# Patient Record
Sex: MALE | Race: WHITE | NOT HISPANIC OR LATINO | Employment: OTHER | URBAN - METROPOLITAN AREA
[De-identification: names, ages, dates, MRNs, and addresses within clinical notes are randomized per-mention and may not be internally consistent; named-entity substitution may affect disease eponyms.]

---

## 2017-04-07 ENCOUNTER — OFFICE VISIT (OUTPATIENT)
Dept: FAMILY MEDICINE | Facility: CLINIC | Age: 57
End: 2017-04-07

## 2017-04-07 VITALS
DIASTOLIC BLOOD PRESSURE: 77 MMHG | WEIGHT: 176 LBS | SYSTOLIC BLOOD PRESSURE: 113 MMHG | TEMPERATURE: 98.8 F | HEART RATE: 88 BPM | OXYGEN SATURATION: 95 % | BODY MASS INDEX: 24.72 KG/M2

## 2017-04-07 DIAGNOSIS — R05.9 COUGH: Primary | ICD-10-CM

## 2017-04-07 LAB
FLUAV AG UPPER RESP QL IA.RAPID: NEGATIVE
FLUBV AG UPPER RESP QL IA.RAPID: NEGATIVE

## 2017-04-07 RX ORDER — PREDNISONE 20 MG/1
TABLET ORAL
Qty: 10 TABLET | Refills: 0 | Status: SHIPPED | OUTPATIENT
Start: 2017-04-07 | End: 2017-06-20

## 2017-04-07 RX ORDER — CODEINE PHOSPHATE AND GUAIFENESIN 10; 100 MG/5ML; MG/5ML
1-2 SOLUTION ORAL EVERY 4 HOURS PRN
Qty: 240 ML | Refills: 0 | Status: SHIPPED | OUTPATIENT
Start: 2017-04-07 | End: 2017-06-20

## 2017-04-07 RX ORDER — ALBUTEROL SULFATE 90 UG/1
2 AEROSOL, METERED RESPIRATORY (INHALATION) EVERY 4 HOURS PRN
Qty: 1 INHALER | Refills: 1 | Status: SHIPPED | OUTPATIENT
Start: 2017-04-07 | End: 2017-06-20

## 2017-04-07 ASSESSMENT — PAIN SCALES - GENERAL: PAINLEVEL: NO PAIN (0)

## 2017-04-07 NOTE — PROGRESS NOTES
Tommy Lerner is a 56 year old male here for the following issues:    Yifan Elmorey is a 55 yo male with 6 day history of fever, chills, rattle in his chest, some body aches, headache, mild sore throat. Decreased appetite no vomiting. Has tightness in his chest. His sone, age 16 was ill with similar symptoms last week. Nonsmoker. Denies SOB. Not able to sleep due to cough. He has no hx of asthma.       Patient Active Problem List   Diagnosis     WALDEMAR (obstructive sleep apnea)       Current Outpatient Prescriptions   Medication Sig Dispense Refill     sildenafil (VIAGRA) 100 MG tablet Take 0.5-1 tablets ( mg) by mouth daily as needed for erectile dysfunction Take 30 min to 4 hours before intercourse.  Never use with nitroglycerin, terazosin or doxazosin. 6 tablet 1     order for DME Patient Tommy Lerner was set up at [facility] on April 29, 2016. Patient received a Resmed AirSense 10 Auto. Pressures were set at Auto 5 - 15 cm H2O.   Patient s ramp is r Off and FLEX/EPR is 3.  Patient received a Lebron Respironics Mask name: DREAMWEAR  Nasal mask Size Medium, heated tubing and heated humidifier.  Patient is enrolled in the STM Program and does not need to meet compliance. Patient has a follow up on 06/20/2016 with Bennett Goltz, PA.    Rosanne Franco Equipment being ordered: CPAP         No Known Allergies     EXAM  /77 (BP Location: Right arm, Patient Position: Chair, Cuff Size: Adult Regular)  Pulse 88  Temp 98.8  F (37.1  C) (Oral)  Wt 176 lb (79.8 kg)  SpO2 95%  BMI 24.72 kg/m2  Gen: appears fatigued, coughing  HEENT:  Conjunctiva nl, TM normal bilaterally, OP clear, no posterior erythema  COR: S1,S2, no murmur  Lungs: CTA bilaterally, no rhonchi, wheezes or rales  Ext: no peripheral edema, pulses full    Results for orders placed or performed in visit on 04/07/17   Influenza A/B Antigen ([place])   Result Value Ref Range    Influenza A NEGATIVE Negative    Influenza B NEGATIVE Negative            Assessment:  (R05) Cough  (primary encounter diagnosis)  Comment: influenza like illness, testing is negative. Bothersome cough, interrupting sleep  Plan: Influenza A/B Antigen ([place]), albuterol         (VENTOLIN HFA) 108 (90 BASE) MCG/ACT Inhaler,         guaiFENesin-codeine (ROBITUSSIN AC) 100-10         MG/5ML SOLN solution, predniSONE (DELTASONE) 20        MG tablet        Recommend burst of steroids, albuterol inhaler with spacer, cough syrup. rest    Shae Brumfield MD  Internal Medicine/Pediatrics

## 2017-04-07 NOTE — MR AVS SNAPSHOT
After Visit Summary   4/7/2017    Tommy Lerner    MRN: [id number]           Patient Information     Date Of Birth          1960        Visit Information        Provider Department      4/7/2017 3:20 PM Shae Brumfield MD [facility]        Today's Diagnoses     Cough    -  1       Follow-ups after your visit        Your next 10 appointments already scheduled     Jun 20, 2017  1:00 PM CDT   Return Sleep Patient with Bennett Ezra Goltz, PA-C   [facility] ([facility])    [address] [place] [address] [id number] [phone]              Who to contact     Please call your clinic at [phone] to:    Ask questions about your health    Make or cancel appointments    Discuss your medicines    Learn about your test results    Speak to your doctor   If you have compliments or concerns about an experience at your clinic, or if you wish to file a complaint, please contact [facility] Patient Relations at [phone] or email us at [email or web address]         Additional Information About Your Visit        MyChart Information     [employer] gives you secure access to your electronic health record. If you see a primary care provider, you can also send messages to your care team and make appointments. If you have questions, please call your primary care clinic.  If you do not have a primary care provider, please call [phone] and they will assist you.      [employer] is an electronic gateway that provides easy, online access to your medical records. With [employer], you can request a clinic appointment, read your test results, renew a prescription or communicate with your care team.     To access your existing account, please contact your [facility] Clinic or call [phone] for assistance.        Care EveryWhere ID     This is your Care EveryWhere ID. This could be used by  other organizations to access your [place] medical records  [id number]        Your Vitals Were     Pulse Temperature Pulse Oximetry BMI (Body Mass Index)          88 98.8  F (37.1  C) (Oral) 95% 24.72 kg/m2         Blood Pressure from Last 3 Encounters:   04/07/17 113/77   06/20/16 129/73   02/12/16 115/79    Weight from Last 3 Encounters:   04/07/17 176 lb (79.8 kg)   06/20/16 173 lb (78.5 kg)   02/12/16 169 lb (76.7 kg)              We Performed the Following     Influenza A/B Antigen ([place])          Today's Medication Changes          These changes are accurate as of: 4/7/17  3:56 PM.  If you have any questions, ask your nurse or doctor.               Start taking these medicines.        Dose/Directions    albuterol 108 (90 BASE) MCG/ACT Inhaler   Commonly known as:  VENTOLIN HFA   Used for:  Cough   Started by:  Shae Brumfield MD        Dose:  2 puff   Inhale 2 puffs into the lungs every 4 hours as needed for shortness of breath / dyspnea or wheezing   Quantity:  1 Inhaler   Refills:  1       guaiFENesin-codeine 100-10 MG/5ML Soln solution   Commonly known as:  ROBITUSSIN AC   Used for:  Cough   Started by:  Shae Brumfield MD        Dose:  1-2 tsp.   Take 5-10 mLs by mouth every 4 hours as needed for cough   Quantity:  240 mL   Refills:  0       predniSONE 20 MG tablet   Commonly known as:  DELTASONE   Used for:  Cough   Started by:  Shae Brumfield MD        Take 2 tablets daily x 5 days   Quantity:  10 tablet   Refills:  0            Where to get your medicines      These medications were sent to [facility] [address] [place] [id number]     Phone:  [phone]     albuterol 108 (90 BASE) MCG/ACT Inhaler         Some of these will need a paper prescription and others can be bought over the counter.  Ask your nurse if you have questions.     Bring a paper prescription for each of these medications     guaiFENesin-codeine  100-10 MG/5ML Soln solution    predniSONE 20 MG tablet                Primary Care Provider Office Phone # Fax #    Shae Zoë Brumfield [phone]       [address] [address]        Thank you!     Thank you for choosing [facility]  for your care. Our goal is always to provide you with excellent care. Hearing back from our patients is one way we can continue to improve our services. Please take a few minutes to complete the written survey that you may receive in the mail after your visit with us. Thank you!             Your Updated Medication List - Protect others around you: Learn how to safely use, store and throw away your medicines at www.disposemymeds.org.          This list is accurate as of: 4/7/17  3:56 PM.  Always use your most recent med list.                   Brand Name Dispense Instructions for use    albuterol 108 (90 BASE) MCG/ACT Inhaler    VENTOLIN HFA    1 Inhaler    Inhale 2 puffs into the lungs every 4 hours as needed for shortness of breath / dyspnea or wheezing       guaiFENesin-codeine 100-10 MG/5ML Soln solution    ROBITUSSIN AC    240 mL    Take 5-10 mLs by mouth every 4 hours as needed for cough       order for DME      Patient Tommy Lerner was set up at [facility] on April 29, 2016. Patient received a Resmed AirSense 10 Auto. Pressures were set at Auto 5 - 15 cm H2O.   Patient?s ramp is r Off and FLEX/EPR is 3.  Patient received a Lebron Respironics Mask name: DREAMWEAR  Nasal mask Size Medium, heated tubing and heated humidifier.  Patient is enrolled in the STM Program and does not need to meet compliance. Patient has a follow up on 06/20/2016 with Bennett Goltz, PA.   Rosanne Franco Equipment being ordered: CPAP       predniSONE 20 MG tablet    DELTASONE    10 tablet    Take 2 tablets daily x 5 days       sildenafil 100 MG cap/tab    VIAGRA    6 tablet    Take 0.5-1 tablets ( mg) by mouth daily as needed for erectile  dysfunction Take 30 min to 4 hours before intercourse.  Never use with nitroglycerin, terazosin or doxazosin.

## 2017-04-07 NOTE — NURSING NOTE
56 year old  Chief Complaint   Patient presents with     Cough     fever, fatigue, rattle in chest, pressure on chest, chills, sweats, and runny nose        Blood pressure 113/77, pulse 88, temperature 98.8  F (37.1  C), temperature source Oral, weight 176 lb (79.8 kg), SpO2 95 %. Body mass index is 24.72 kg/(m^2).  Patient Active Problem List   Diagnosis     WALDEMAR (obstructive sleep apnea)       Wt Readings from Last 2 Encounters:   04/07/17 176 lb (79.8 kg)   06/20/16 173 lb (78.5 kg)     BP Readings from Last 3 Encounters:   04/07/17 113/77   06/20/16 129/73   02/12/16 115/79         Current Outpatient Prescriptions   Medication     sildenafil (VIAGRA) 100 MG tablet     order for DME     No current facility-administered medications for this visit.        Social History   Substance Use Topics     Smoking status: Never Smoker     Smokeless tobacco: Former User     Types: Chew     Quit date: 6/24/1994     Alcohol use No      Comment: mostly on weekends, 1-2 drinks       Health Maintenance Due   Topic Date Due     TETANUS IMMUNIZATION (SYSTEM ASSIGNED)  08/07/1977     ADVANCE DIRECTIVE PLANNING Q5 YRS (NO INBASKET)  08/07/1977     HEPATITIS C SCREENING  08/07/1977     COLON CANCER SCREEN (SYSTEM ASSIGNED)  08/07/2009       No results found for: PAP      April 7, 2017 3:29 PM

## 2017-06-20 ENCOUNTER — OFFICE VISIT (OUTPATIENT)
Dept: SLEEP MEDICINE | Facility: CLINIC | Age: 57
End: 2017-06-20
Payer: COMMERCIAL

## 2017-06-20 VITALS
HEIGHT: 71 IN | HEART RATE: 66 BPM | DIASTOLIC BLOOD PRESSURE: 83 MMHG | SYSTOLIC BLOOD PRESSURE: 130 MMHG | OXYGEN SATURATION: 95 % | WEIGHT: 171 LBS | BODY MASS INDEX: 23.94 KG/M2

## 2017-06-20 DIAGNOSIS — G47.33 OSA (OBSTRUCTIVE SLEEP APNEA): Primary | ICD-10-CM

## 2017-06-20 PROCEDURE — 99213 OFFICE O/P EST LOW 20 MIN: CPT | Performed by: PHYSICIAN ASSISTANT

## 2017-06-20 NOTE — NURSING NOTE
"Chief Complaint   Patient presents with     CPAP Follow Up     follow up nadine       Initial /83  Pulse 66  Ht 1.803 m (5' 11\")  Wt 77.6 kg (171 lb)  SpO2 95%  BMI 23.85 kg/m2 Estimated body mass index is 23.85 kg/(m^2) as calculated from the following:    Height as of this encounter: 1.803 m (5' 11\").    Weight as of this encounter: 77.6 kg (171 lb).  Medication Reconciliation: complete   Ess 5/24  Irma Hernandez MA      "

## 2017-06-20 NOTE — PATIENT INSTRUCTIONS

## 2017-06-20 NOTE — MR AVS SNAPSHOT
After Visit Summary   6/20/2017    Tommy Lerner    MRN: [id number]           Patient Information     Date Of Birth          1960        Visit Information        Provider Department      6/20/2017 1:00 PM Goltz, Bennett Ezra, PA-C [facility]        Today's Diagnoses     WALDEMAR (obstructive sleep apnea)    -  1      Care Instructions      Your BMI is Body mass index is 23.85 kg/(m^2).  Weight management is a personal decision.  If you are interested in exploring weight loss strategies, the following discussion covers the approaches that may be successful. Body mass index (BMI) is one way to tell whether you are at a healthy weight, overweight, or obese. It measures your weight in relation to your height.  A BMI of 18.5 to 24.9 is in the healthy range. A person with a BMI of 25 to 29.9 is considered overweight, and someone with a BMI of 30 or greater is considered obese. More than two-thirds of American adults are considered overweight or obese.  Being overweight or obese increases the risk for further weight gain. Excess weight may lead to heart disease and diabetes.  Creating and following plans for healthy eating and physical activity may help you improve your health.  Weight control is part of healthy lifestyle and includes exercise, emotional health, and healthy eating habits. Careful eating habits lifelong are the mainstay of weight control. Though there are significant health benefits from weight loss, long-term weight loss with diet alone may be very difficult to achieve- studies show long-term success with dietary management in less than 10% of people. Attaining a healthy weight may be especially difficult to achieve in those with severe obesity. In some cases, medications, devices and surgical management might be considered.  What can you do?  If you are overweight or obese and are interested in methods for weight loss, you should discuss this with your provider.      Consider reducing daily calorie intake by 500 calories.     Keep a food journal.     Avoiding skipping meals, consider cutting portions instead.    Diet combined with exercise helps maintain muscle while optimizing fat loss. Strength training is particularly important for building and maintaining muscle mass. Exercise helps reduce stress, increase energy, and improves fitness. Increasing exercise without diet control, however, may not burn enough calories to loose weight.       Start walking three days a week 10-20 minutes at a time    Work towards walking thirty minutes five days a week     Eventually, increase the speed of your walking for 1-2 minutes at time    In addition, we recommend that you review healthy lifestyles and methods for weight loss available through the National Institutes of Health patient information sites:  http://win.niddk.nih.gov/publications/index.htm    And look into health and wellness programs that may be available through your health insurance provider, employer, local community center, or david club.                    Follow-ups after your visit        Follow-up notes from your care team     Return in about 2 years (around 6/20/2019), or if symptoms worsen or fail to improve, for CPAP compliance recheck.      Who to contact     If you have questions or need follow up information about today's clinic visit or your schedule please contact [place] [facility] directly at [phone].  Normal or non-critical lab and imaging results will be communicated to you by MyChart, letter or phone within 4 business days after the clinic has received the results. If you do not hear from us within 7 days, please contact the clinic through MyChart or phone. If you have a critical or abnormal lab result, we will notify you by phone as soon as possible.  Submit refill requests through [employer] or call your pharmacy and they will forward the refill request to us. Please allow 3 business days  "for your refill to be completed.          Additional Information About Your Visit        MyChart Information     [employer] gives you secure access to your electronic health record. If you see a primary care provider, you can also send messages to your care team and make appointments. If you have questions, please call your primary care clinic.  If you do not have a primary care provider, please call [phone] and they will assist you.        Care EveryWhere ID     This is your Care EveryWhere ID. This could be used by other organizations to access your [place] medical records  [id number]        Your Vitals Were     Pulse Height Pulse Oximetry BMI (Body Mass Index)          66 1.803 m (5' 11\") 95% 23.85 kg/m2         Blood Pressure from Last 3 Encounters:   06/20/17 130/83   04/07/17 113/77   06/20/16 129/73    Weight from Last 3 Encounters:   06/20/17 77.6 kg (171 lb)   04/07/17 79.8 kg (176 lb)   06/20/16 78.5 kg (173 lb)              We Performed the Following     Comprehensive DME          Today's Medication Changes          These changes are accurate as of: 6/20/17  1:37 PM.  If you have any questions, ask your nurse or doctor.               Stop taking these medicines if you haven't already. Please contact your care team if you have questions.     albuterol 108 (90 BASE) MCG/ACT Inhaler   Commonly known as:  VENTOLIN HFA           guaiFENesin-codeine 100-10 MG/5ML Soln solution   Commonly known as:  ROBITUSSIN AC           order for DME           predniSONE 20 MG tablet   Commonly known as:  DELTASONE           sildenafil 100 MG cap/tab   Commonly known as:  VIAGRA                    Primary Care Provider Office Phone # Fax #    Shae Brumfield [phone]       [place] [id number] [facility] [address] [facility] [address]        Thank you!     Thank you for choosing [place] [facility]  for your care. Our goal is always to provide you with excellent care. Hearing back from our " patients is one way we can continue to improve our services. Please take a few minutes to complete the written survey that you may receive in the mail after your visit with us. Thank you!             Your Updated Medication List - Protect others around you: Learn how to safely use, store and throw away your medicines at www.disposemymeds.org.      Notice  As of 6/20/2017  1:37 PM    You have not been prescribed any medications.

## 2017-06-20 NOTE — PROGRESS NOTES
Sleep Study Follow-Up Visit:    Date on this visit: 6/20/2017    Tommy Lerner comes in today for follow-up of his CPAP use for mild to moderate WALDEMAR. He was initially seen at the [facility] because his wife tells him he stops breathing in his sleep for 10 years. His medical history is non-contributory. His mother has WALDEMAR.     AHI: 14.8/hr    VIRIDIANA: 14.1/hr   Supine AHI: 39.4/hr  Lateral AHI: 0.5/hr on left and 2.6/hr on right              Average SpO2: 92%  Lowest Desaturation: 82%.  Time Spent Below 89%: 15.6 minutes     He is on auto CPAP 6-12 cm. He is doing well with CPAP. It makes a big difference. He is not aware of snoring with CPAP. No dry nose or mouth. No mask leak issues. He uses a DreamWear Mask. He is comfortable with the temperature and humidity of the CPAP. He has not adjusted the humidity.    The compliance data shows that he has used the CPAP for 30/30 nights, 90% of nights for >4 hours.  The 95th% pressure is 9.5 cm.  The 95th% leak is 12.4 lpm.  The average nightly usage is 6:31, median 6:50.  The average AHI is 0.6/hr.      Past medical/surgical history, family history, social history, medications and allergies were reviewed.      Problem List:  Patient Active Problem List    Diagnosis Date Noted     WALDEMAR (obstructive sleep apnea) 04/25/2016     Priority: Medium        Impression/Plan:    (G47.33) WALDEMAR (obstructive sleep apnea)  (primary encounter diagnosis)  Comment: he is doing very well with CPAP, but not cleaning or changing supplies very often  Plan: Comprehensive DME        Continue auto CPAP 6-12 cm. We looked online at websites to buy supplies. He has a very high deductible. We reviewed recommendations for cleaning and replacing supplies.      He will follow up with me in about 2 year(s), sooner if needed.     Fifteen minutes spent with patient, all of which were spent face-to-face counseling, consulting, coordinating plan of care.      Bennett Goltz, PA-C    CC: No ref.  provider found

## 2017-11-12 ENCOUNTER — HOSPITAL ENCOUNTER (EMERGENCY)
Facility: CLINIC | Age: 57
Discharge: HOME | End: 2017-11-12
Attending: FAMILY MEDICINE | Admitting: FAMILY MEDICINE
Payer: COMMERCIAL

## 2017-11-12 VITALS
OXYGEN SATURATION: 97 % | HEIGHT: 71 IN | SYSTOLIC BLOOD PRESSURE: 115 MMHG | WEIGHT: 166 LBS | TEMPERATURE: 97.7 F | RESPIRATION RATE: 16 BRPM | BODY MASS INDEX: 23.24 KG/M2 | DIASTOLIC BLOOD PRESSURE: 79 MMHG

## 2017-11-12 DIAGNOSIS — S61.213A LACERATION OF LEFT MIDDLE FINGER WITHOUT FOREIGN BODY WITHOUT DAMAGE TO NAIL, INITIAL ENCOUNTER: ICD-10-CM

## 2017-11-12 PROCEDURE — 99283 EMERGENCY DEPT VISIT LOW MDM: CPT | Performed by: EMERGENCY MEDICINE

## 2017-11-12 PROCEDURE — 99283 EMERGENCY DEPT VISIT LOW MDM: CPT | Mod: Z6 | Performed by: EMERGENCY MEDICINE

## 2017-11-12 PROCEDURE — 12001 RPR S/N/AX/GEN/TRNK 2.5CM/<: CPT | Performed by: EMERGENCY MEDICINE

## 2017-11-12 RX ORDER — ASPIRIN 81 MG/1
81 TABLET, CHEWABLE ORAL DAILY
COMMUNITY
End: 2020-01-15

## 2017-11-12 NOTE — ED AVS SNAPSHOT
[facility], Emergency Department    [address]    [facility] [place] [place] [address]    Phone:  [phone]    Fax:  [phone]                                       Tommy Lerner   MRN: [id number]    Department:  [facility], Emergency Department   Date of Visit:  11/12/2017           Patient Information     Date Of Birth          1960        Your diagnoses for this visit were:     Laceration of left middle finger without foreign body without damage to nail, initial encounter        You were seen by Charlie Magdaleno MD.        Discharge Instructions         Extremity Laceration: Stitches, Staples, or Tape  A laceration is a cut through the skin. If it is deep, it may require stitches or staples to close so it can heal. Minor cuts may be treated with surgical tape closures, or skin glue.  X-rays may be done if something may have entered the skin through the cut. You may also need a tetanus shot if you are not up to date on this vaccination.  Home care    Follow the healthcare provider s instructions on how to care for the cut.    Wash your hands with soap and warm water before and after caring for your wound. This is to help prevent infection.    Keep the wound clean and dry. If a bandage was applied and it becomes wet or dirty, replace it. Otherwise, leave it in place for the first 24 hours, then change it once a day or as directed.    If stitches or staples were used, clean the wound daily:    After removing the bandage, wash the area with soap and water. Use a wet cotton swab to loosen and remove any blood or crust that forms.    After cleaning, keep the wound clean and dry. Talk with your healthcare provider before applying any antibiotic ointment to the wound. Reapply the bandage.    You may remove the bandage to shower as usual after the first 24 hours, but don't soak the area in water (no swimming) until the stitches or staples are removed.    If surgical tape closures were used, keep the area clean  and dry. If it becomes wet, blot it dry with a towel. Let the surgical tape fall off on its own.    The healthcare provider may prescribe an antibiotic cream or ointment to prevent infection. He or she may also prescribe an antibiotic pill. Don't stop taking this medicine until you have finished the prescribed course or the provider tells you to stop. The provider may also prescribe medicine for pain. Follow the instructions for taking these medicines.    Avoid activities that may reopen your wound.  Follow-up care  Follow up with your healthcare provider, or as advised. Most skin wounds heal within 10 days. However, an infection may sometimes occur despite proper treatment. Check the wound daily for the signs of infection listed below. Stitches and staples should be removed within 7 to14 days. If surgical tape closures were used, you may remove them after 10 days if they have not fallen off by then.   When to seek medical advice  Call your healthcare provider right away if any of these occur:    Wound bleeding not controlled by direct pressure    Signs of infection, including increasing pain in the wound, increasing wound redness or swelling, or pus or bad odor coming from the wound    Fever of 100.4 F (38 C) or higher or as directed by your healthcare provider    Stitches or staples come apart or fall out or surgical tape falls off before 7 days    Wound edges re-open    Wound changes colors    Numbness occurs around the wound     Decreased movement around the injured area  Date Last Reviewed: 7/1/2017 2000-2017 The [employer]. [address] [place] [address]. All rights reserved. This information is not intended as a substitute for professional medical care. Always follow your healthcare professional's instructions.          24 Hour Appointment Hotline       To make an appointment at any [facility] clinic, call [phone] ([phone]). If you don't have a family doctor or clinic, we  will help you find one. [facility] clinics are conveniently located to serve the needs of you and your family.             Review of your medicines      Our records show that you are taking the medicines listed below. If these are incorrect, please call your family doctor or clinic.        Dose / Directions Last dose taken    aspirin 81 MG chewable tablet   Dose:  81 mg        Take 81 mg by mouth daily   Refills:  0                Orders Needing Specimen Collection     None      Pending Results     No orders found from 11/10/2017 to 11/13/2017.            Pending Culture Results     No orders found from 11/10/2017 to 11/13/2017.            Pending Results Instructions     If you had any lab results that were not finalized at the time of your Discharge, you can call the ED Lab Result RN at [phone]. You will be contacted by this team for any positive Lab results or changes in treatment. The nurses are available 7 days a week from 10A to 6:30P.  You can leave a message 24 hours per day and they will return your call.        Thank you for choosing [facility]       Thank you for choosing [facility] for your care. Our goal is always to provide you with excellent care. Hearing back from our patients is one way we can continue to improve our services. Please take a few minutes to complete the written survey that you may receive in the mail after you visit with us. Thank you!        Healthcare IThart Information     [employer] gives you secure access to your electronic health record. If you see a primary care provider, you can also send messages to your care team and make appointments. If you have questions, please call your primary care clinic.  If you do not have a primary care provider, please call [phone] and they will assist you.        Care EveryWhere ID     This is your Care EveryWhere ID. This could be used by other organizations to access your [facility] medical records  [id number]        Equal Access to Services     MARCELA  WILL : Milenaii mp Potter, wamirnada luqadaha, qaybta kaeric martins, alicia batista. So [place] [phone].    ATENCIÓN: Si habla español, tiene a wilson disposición servicios gratuitos de asistencia lingüística. Llame al [phone].    We comply with applicable federal civil rights laws and [place] laws. We do not discriminate on the basis of race, color, national origin, age, disability, sex, sexual orientation, or gender identity.            After Visit Summary       This is your record. Keep this with you and show to your community pharmacist(s) and doctor(s) at your next visit.

## 2017-11-12 NOTE — ED PROVIDER NOTES
"  History     Chief Complaint   Patient presents with     Laceration     Pt was making chili and cut (L) middle finger with a knife. Tetanus up to date     HPI  Tommy Lerner is a 57 year old male who was making chili in the kitchen using a sharp knife, he was cutting an onion when the knife slipped and cut him on the radial side of the left middle finger distal to the DIP joint.  No other injuries or complaints.  Reports tetanus immunization status up-to-date.  He went to urgent care and was referred to the emergency room.    I have reviewed the Medications, Allergies, Past Medical and Surgical History, and Social History in the Epic system.    Review of Systems  Negative unless mentioned above  Physical Exam   BP: 115/79  Heart Rate: 64  Temp: 97.7  F (36.5  C)  Resp: 16  Height: 180.3 cm (5' 11\")  Weight: 75.3 kg (166 lb)  SpO2: 97 %      Physical Exam   Constitutional: He appears well-developed and well-nourished. No distress.   Eyes: Pupils are equal, round, and reactive to light.   Cardiovascular: Normal rate.    Pulmonary/Chest: Effort normal.   Musculoskeletal: Normal range of motion.        Hands:  No evidence of tendon injury.  Neurovascular status normal.       ED Course     ED Course     Procedures             Critical Care time:  none             Labs Ordered and Resulted from Time of ED Arrival Up to the Time of Departure from the ED - No data to display         Assessments & Plan (with Medical Decision Making)   Superficial cuts to the distal aspect of the left middle finger.  No involvement of the nail, nailbed, tendon, or joint.  Laceration repaired using Steritapes and Dermabond.  The patient was given wound care instructions.    I have reviewed the nursing notes.    I have reviewed the findings, diagnosis, plan and need for follow up with the patient.    New Prescriptions    No medications on file       Final diagnoses:   Laceration of left middle finger without foreign body without damage to " nail, initial encounter       11/12/2017   [facility], [place], EMERGENCY DEPARTMENT     Charlie Magdaleno MD  11/12/17 [phone]

## 2017-11-12 NOTE — ED AVS SNAPSHOT
[facility], [place], Emergency Department    [address]    [facility] [address]    Phone:  [phone]    Fax:  [phone]                                       Tommy Lerner   MRN: [id number]    Department:  [facility], Emergency Department   Date of Visit:  11/12/2017           After Visit Summary Signature Page     I have received my discharge instructions, and my questions have been answered. I have discussed any challenges I see with this plan with the nurse or doctor.    ..........................................................................................................................................  Patient/Patient Representative Signature      ..........................................................................................................................................  Patient Representative Print Name and Relationship to Patient    ..................................................               ................................................  Date                                            Time    ..........................................................................................................................................  Reviewed by Signature/Title    ...................................................              ..............................................  Date                                                            Time

## 2017-11-12 NOTE — DISCHARGE INSTRUCTIONS
Extremity Laceration: Stitches, Staples, or Tape  A laceration is a cut through the skin. If it is deep, it may require stitches or staples to close so it can heal. Minor cuts may be treated with surgical tape closures, or skin glue.  X-rays may be done if something may have entered the skin through the cut. You may also need a tetanus shot if you are not up to date on this vaccination.  Home care    Follow the healthcare provider s instructions on how to care for the cut.    Wash your hands with soap and warm water before and after caring for your wound. This is to help prevent infection.    Keep the wound clean and dry. If a bandage was applied and it becomes wet or dirty, replace it. Otherwise, leave it in place for the first 24 hours, then change it once a day or as directed.    If stitches or staples were used, clean the wound daily:    After removing the bandage, wash the area with soap and water. Use a wet cotton swab to loosen and remove any blood or crust that forms.    After cleaning, keep the wound clean and dry. Talk with your healthcare provider before applying any antibiotic ointment to the wound. Reapply the bandage.    You may remove the bandage to shower as usual after the first 24 hours, but don't soak the area in water (no swimming) until the stitches or staples are removed.    If surgical tape closures were used, keep the area clean and dry. If it becomes wet, blot it dry with a towel. Let the surgical tape fall off on its own.    The healthcare provider may prescribe an antibiotic cream or ointment to prevent infection. He or she may also prescribe an antibiotic pill. Don't stop taking this medicine until you have finished the prescribed course or the provider tells you to stop. The provider may also prescribe medicine for pain. Follow the instructions for taking these medicines.    Avoid activities that may reopen your wound.  Follow-up care  Follow up with your healthcare provider, or as  advised. Most skin wounds heal within 10 days. However, an infection may sometimes occur despite proper treatment. Check the wound daily for the signs of infection listed below. Stitches and staples should be removed within 7 to14 days. If surgical tape closures were used, you may remove them after 10 days if they have not fallen off by then.   When to seek medical advice  Call your healthcare provider right away if any of these occur:    Wound bleeding not controlled by direct pressure    Signs of infection, including increasing pain in the wound, increasing wound redness or swelling, or pus or bad odor coming from the wound    Fever of 100.4 F (38 C) or higher or as directed by your healthcare provider    Stitches or staples come apart or fall out or surgical tape falls off before 7 days    Wound edges re-open    Wound changes colors    Numbness occurs around the wound     Decreased movement around the injured area  Date Last Reviewed: 7/1/2017 2000-2017 The [employer]. [address] [place], [place] [address]. All rights reserved. This information is not intended as a substitute for professional medical care. Always follow your healthcare professional's instructions.

## 2018-01-17 ENCOUNTER — OFFICE VISIT (OUTPATIENT)
Dept: FAMILY MEDICINE | Facility: CLINIC | Age: 58
End: 2018-01-17
Payer: COMMERCIAL

## 2018-01-17 VITALS
SYSTOLIC BLOOD PRESSURE: 127 MMHG | BODY MASS INDEX: 23.24 KG/M2 | OXYGEN SATURATION: 99 % | HEIGHT: 71 IN | TEMPERATURE: 98.1 F | HEART RATE: 65 BPM | DIASTOLIC BLOOD PRESSURE: 83 MMHG | WEIGHT: 166 LBS

## 2018-01-17 DIAGNOSIS — Z23 VIRAL HEPATITIS VACCINATION: ICD-10-CM

## 2018-01-17 DIAGNOSIS — Z23 NEED FOR PROPHYLACTIC VACCINATION AND INOCULATION AGAINST INFLUENZA: ICD-10-CM

## 2018-01-17 DIAGNOSIS — S16.1XXA STRAIN OF NECK MUSCLE, INITIAL ENCOUNTER: ICD-10-CM

## 2018-01-17 DIAGNOSIS — R05.9 COUGH: Primary | ICD-10-CM

## 2018-01-17 RX ORDER — ALBUTEROL SULFATE 90 UG/1
2 AEROSOL, METERED RESPIRATORY (INHALATION) EVERY 4 HOURS PRN
Qty: 1 INHALER | Refills: 1 | Status: SHIPPED | OUTPATIENT
Start: 2018-01-17 | End: 2019-02-08

## 2018-01-17 NOTE — PATIENT INSTRUCTIONS
Sternocleidomastoid muscle stretches    Albuterol refilled    Hepatitis A series is done  Hepatitis B series completed

## 2018-01-17 NOTE — NURSING NOTE
"57 year old  Chief Complaint   Patient presents with     Cough     Flu Shot     Neck Pain     pt has neck pain for about a month      Imm/Inj     pt is wondering about the shingles vaccine also wants to finish the twinrix vaccine       Blood pressure 127/83, pulse 65, temperature 98.1  F (36.7  C), temperature source Oral, height 5' 10.98\" (180.3 cm), weight 166 lb (75.3 kg), SpO2 99 %. Body mass index is 23.16 kg/(m^2).  Patient Active Problem List   Diagnosis     WALDEMAR (obstructive sleep apnea)       Wt Readings from Last 2 Encounters:   01/17/18 166 lb (75.3 kg)   11/12/17 166 lb (75.3 kg)     BP Readings from Last 3 Encounters:   01/17/18 127/83   11/12/17 115/79   06/20/17 130/83         Current Outpatient Prescriptions   Medication     aspirin 81 MG chewable tablet     No current facility-administered medications for this visit.        Social History   Substance Use Topics     Smoking status: Never Smoker     Smokeless tobacco: Former User     Types: Chew     Quit date: 6/24/1994     Alcohol use No      Comment: mostly on weekends, 1-2 drinks       Health Maintenance Due   Topic Date Due     HEPATITIS C SCREENING  08/07/1977     COLON CANCER SCREEN (SYSTEM ASSIGNED)  08/07/2009     ADVANCE DIRECTIVE PLANNING Q5 YRS  08/07/2014     INFLUENZA VACCINE (SYSTEM ASSIGNED)  09/01/2017       No results found for: PAP      January 17, 2018 9:15 AM  "

## 2018-01-17 NOTE — PROGRESS NOTES
"Tommy Lerner is a 57 year old male here for the following issues:       Cough  Jaime presents with complaint of cough. Cough has been ongoing for \"years\". Lots of postnasal drip in the morning. He sometimes associates coughing after eating raising suspicion of GERD.     He is a cross country skier. He describes a dry cough with exercise. He does not currently feel he needs to use an inhaler.  He does not take antihistamine. He has a dog allergy and reports he owned a dog (which  a month ago). He is also allergic to a down comforter, which is on his bed.     Strain of neck muscle, initial encounter  Jaime reports pain with turning his neck to the left. He points to pain over left anterior SCM. Ongoing for about a month. Painful to touch. Denies numbness/ tingling.  Cannot recall an injury. He is not taking anything for pain. No use of ice or heat    Vaccine request  He is requesting flu vaccine today. Also would like to complete Hep B series, he has had 2 previous Twinrix vaccines. He is also asking about Shingrix vaccine, which is not yet available.     Patient Active Problem List   Diagnosis     WALDEMAR (obstructive sleep apnea)       Current Outpatient Prescriptions   Medication Sig Dispense Refill     aspirin 81 MG chewable tablet Take 81 mg by mouth daily         No Known Allergies     EXAM  /83  Pulse 65  Temp 98.1  F (36.7  C) (Oral)  Ht 5' 10.98\" (180.3 cm)  Wt 166 lb (75.3 kg)  SpO2 99%  BMI 23.16 kg/m2  Gen: Alert, pleasant, NAD  HEENT:  Conjunctiva nl, TM normal bilaterally, OP clear, no posterior erythema  Neck: FROM, pain with lateral rotation looking left.  Point tenderness over anterior Left SCM  COR: S1,S2, no murmur  Lungs: CTA bilaterally, no rhonchi, wheezes or rales  Abdomen: Soft, non tender, normal bowel sounds      Assessment:  (R05) Cough  (primary encounter diagnosis)  Comment: suspect need for use of albuterol inhaler before exercise, may have GERD component  Plan: albuterol " (VENTOLIN HFA) 108 (90 BASE) MCG/ACT         Inhaler        Recommend antihistamine as he may have seasonal allergies. Use inhaler before exercise. Treat GERD if he has significant symptoms    (Z23) Need for prophylactic vaccination and inoculation against influenza  Comment: per pt request  Plan: FLU VAC, SPLIT VIRUS IM > 3 YO (QUADRIVALENT)         [24662], Vaccine Administration, Initial         [32720]        Given today    (S16.1XXA) Strain of neck muscle, initial encounter  Comment: left sided muscle strain  Plan: printed handout on home exercises for SCM strain. He may use NSAIDs, heat. If not improving could refer him to PT    (Z23) Viral hepatitis vaccination  Comment: due for final Hepatitis B vaccine, has had 2 twinrix vaccines  Plan: HEPATITIS B VACCINE,ADULT,IM        This vaccine completes the series    Shae Brumfield MD  Internal Medicine/Pediatrics

## 2018-01-17 NOTE — MR AVS SNAPSHOT
After Visit Summary   1/17/2018    Tommy Lerner    MRN: [id number]           Patient Information     Date Of Birth          1960        Visit Information        Provider Department      1/17/2018 9:20 AM Shae Brumfield MD [facility]        Today's Diagnoses     Cough    -  1    Need for prophylactic vaccination and inoculation against influenza        Strain of neck muscle, initial encounter        Viral hepatitis vaccination          Care Instructions    Sternocleidomastoid muscle stretches    Albuterol refilled    Hepatitis A series is done  Hepatitis B series completed           Follow-ups after your visit        Who to contact     Please call your clinic at [phone] to:    Ask questions about your health    Make or cancel appointments    Discuss your medicines    Learn about your test results    Speak to your doctor   If you have compliments or concerns about an experience at your clinic, or if you wish to file a complaint, please contact [facility] Patient Relations at [phone] or email us at [email or web address]         Additional Information About Your Visit        [email or web address] Information     [employer] gives you secure access to your electronic health record. If you see a primary care provider, you can also send messages to your care team and make appointments. If you have questions, please call your primary care clinic.  If you do not have a primary care provider, please call [phone] and they will assist you.      [employer] is an electronic gateway that provides easy, online access to your medical records. With [employer], you can request a clinic appointment, read your test results, renew a prescription or communicate with your care team.     To access your existing account, please contact your [facility] or call [phone] for assistance.        Care EveryWhere ID     This is your Care EveryWhere  "ID. This could be used by other organizations to access your [facility] medical records  [id number]        Your Vitals Were     Pulse Temperature Height Pulse Oximetry BMI (Body Mass Index)       65 98.1  F (36.7  C) (Oral) 5' 10.98\" (180.3 cm) 99% 23.16 kg/m2        Blood Pressure from Last 3 Encounters:   01/17/18 127/83   11/12/17 115/79   06/20/17 130/83    Weight from Last 3 Encounters:   01/17/18 166 lb (75.3 kg)   11/12/17 166 lb (75.3 kg)   06/20/17 171 lb (77.6 kg)              We Performed the Following     FLU VAC, SPLIT VIRUS IM > 3 YO (QUADRIVALENT) [37777]     HEPATITIS B VACCINE,ADULT,IM     Vaccine Administration, Initial [36873]          Today's Medication Changes          These changes are accurate as of: 1/17/18  9:52 AM.  If you have any questions, ask your nurse or doctor.               Start taking these medicines.        Dose/Directions    albuterol 108 (90 BASE) MCG/ACT Inhaler   Commonly known as:  VENTOLIN HFA   Used for:  Cough   Started by:  Shae Brumfield MD        Dose:  2 puff   Inhale 2 puffs into the lungs every 4 hours as needed for shortness of breath / dyspnea or wheezing   Quantity:  1 Inhaler   Refills:  1            Where to get your medicines      These medications were sent to [address] [address]     Phone:  [phone]     albuterol 108 (90 BASE) MCG/ACT Inhaler                Primary Care Provider Office Phone # Fax #    Shae Brumfield [phone] [address] [place] [address]        Equal Access to Services     MARIAN SOLIS AH: Hadsea Potter, waglen ludesireeadaha, papo kaalmada eliezer, alicia batista. So [facility] [phone].    ATENCIÓN: Si habla español, tiene a wilson disposición servicios gratuitos de asistencia lingüística. Llame al [phone].    We comply with applicable federal civil rights laws and " [place] laws. We do not discriminate on the basis of race, color, national origin, age, disability, sex, sexual orientation, or gender identity.            Thank you!     Thank you for choosing [facility]  for your care. Our goal is always to provide you with excellent care. Hearing back from our patients is one way we can continue to improve our services. Please take a few minutes to complete the written survey that you may receive in the mail after your visit with us. Thank you!             Your Updated Medication List - Protect others around you: Learn how to safely use, store and throw away your medicines at www.disposemymeds.org.          This list is accurate as of: 1/17/18  9:52 AM.  Always use your most recent med list.                   Brand Name Dispense Instructions for use Diagnosis    albuterol 108 (90 BASE) MCG/ACT Inhaler    VENTOLIN HFA    1 Inhaler    Inhale 2 puffs into the lungs every 4 hours as needed for shortness of breath / dyspnea or wheezing    Cough       aspirin 81 MG chewable tablet      Take 81 mg by mouth daily

## 2019-02-08 ENCOUNTER — OFFICE VISIT (OUTPATIENT)
Dept: FAMILY MEDICINE | Facility: CLINIC | Age: 59
End: 2019-02-08
Payer: COMMERCIAL

## 2019-02-08 VITALS
TEMPERATURE: 97.7 F | DIASTOLIC BLOOD PRESSURE: 83 MMHG | HEART RATE: 63 BPM | SYSTOLIC BLOOD PRESSURE: 125 MMHG | OXYGEN SATURATION: 98 %

## 2019-02-08 DIAGNOSIS — Z13.9 SCREENING FOR CONDITION: ICD-10-CM

## 2019-02-08 DIAGNOSIS — Z12.11 SCREEN FOR COLON CANCER: ICD-10-CM

## 2019-02-08 DIAGNOSIS — Z12.5 SCREENING FOR PROSTATE CANCER: ICD-10-CM

## 2019-02-08 DIAGNOSIS — Z11.59 NEED FOR HEPATITIS C SCREENING TEST: ICD-10-CM

## 2019-02-08 DIAGNOSIS — R05.9 COUGH: ICD-10-CM

## 2019-02-08 DIAGNOSIS — Z13.220 LIPID SCREENING: ICD-10-CM

## 2019-02-08 DIAGNOSIS — Z13.0 SCREENING FOR DEFICIENCY ANEMIA: ICD-10-CM

## 2019-02-08 DIAGNOSIS — R68.89 EXERCISE INTOLERANCE: ICD-10-CM

## 2019-02-08 DIAGNOSIS — N52.9 ERECTILE DYSFUNCTION, UNSPECIFIED ERECTILE DYSFUNCTION TYPE: ICD-10-CM

## 2019-02-08 DIAGNOSIS — F43.22 ADJUSTMENT DISORDER WITH ANXIOUS MOOD: Primary | ICD-10-CM

## 2019-02-08 LAB
ALBUMIN SERPL-MCNC: 3.6 G/DL (ref 3.2–4.5)
ALP SERPL-CCNC: 70 U/L (ref 40–150)
ALT SERPL-CCNC: 28 U/L (ref 0–70)
AST SERPL-CCNC: 25 U/L (ref 0–55)
BILIRUB SERPL-MCNC: 1.2 MG/DL (ref 0.2–1.3)
BUN SERPL-MCNC: 17 MG/DL (ref 7–30)
CALCIUM SERPL-MCNC: 9.2 MG/DL (ref 8.5–10.4)
CHLORIDE SERPLBLD-SCNC: 105 MMOL/L (ref 94–109)
CHOLEST SERPL-MCNC: 201 MG/DL
CO2 SERPL-SCNC: 30 MMOL/L (ref 20–32)
CREAT SERPL-MCNC: 1.5 MG/DL (ref 0.8–1.5)
EGFR CALCULATED (BLACK REFERENCE): 61.6
EGFR CALCULATED (NON BLACK REFERENCE): 50.9
ERYTHROCYTE [DISTWIDTH] IN BLOOD BY AUTOMATED COUNT: 12.8 % (ref 10–15)
GLUCOSE SERPL-MCNC: 92 MG/DL (ref 60–109)
HCT VFR BLD AUTO: 45.8 % (ref 40–53)
HDLC SERPL-MCNC: 75 MG/DL
HGB BLD-MCNC: 15.5 G/DL (ref 13.3–17.7)
LDLC SERPL CALC-MCNC: 103 MG/DL
MCH RBC QN AUTO: 31.8 PG (ref 26.5–33)
MCHC RBC AUTO-ENTMCNC: 33.8 G/DL (ref 31.5–36.5)
MCV RBC AUTO: 94 FL (ref 78–100)
NONHDLC SERPL-MCNC: 126 MG/DL
PLATELET # BLD AUTO: 245 10E9/L (ref 150–450)
POTASSIUM SERPL-SCNC: 4.8 MMOL/L (ref 3.4–5.3)
PROT SERPL-MCNC: 6.5 G/DL (ref 6.8–8.8)
PSA SERPL-ACNC: 1.02 UG/L (ref 0–4)
RBC # BLD AUTO: 4.87 10E12/L (ref 4.4–5.9)
SODIUM SERPL-SCNC: 144 MMOL/L (ref 136–145)
TRIGL SERPL-MCNC: 114 MG/DL
WBC # BLD AUTO: 6.7 10E9/L (ref 4–11)

## 2019-02-08 RX ORDER — SILDENAFIL 100 MG/1
100 TABLET, FILM COATED ORAL DAILY PRN
Qty: 30 TABLET | Refills: 11 | Status: SHIPPED | OUTPATIENT
Start: 2019-02-08 | End: 2019-07-11

## 2019-02-08 RX ORDER — ALBUTEROL SULFATE 90 UG/1
2 AEROSOL, METERED RESPIRATORY (INHALATION) EVERY 4 HOURS PRN
Qty: 1 INHALER | Refills: 11 | Status: SHIPPED | OUTPATIENT
Start: 2019-02-08 | End: 2020-10-13

## 2019-02-08 NOTE — NURSING NOTE
58 year old  Chief Complaint   Patient presents with     Blood Draw     PSA testing      Refill Request     inhaler and also blood pressure medication       Blood pressure 125/83, pulse 63, temperature 97.7  F (36.5  C), temperature source Oral, SpO2 98 %. There is no height or weight on file to calculate BMI.  Patient Active Problem List   Diagnosis     WALDEMAR (obstructive sleep apnea)       Wt Readings from Last 2 Encounters:   01/17/18 75.3 kg (166 lb)   11/12/17 75.3 kg (166 lb)     BP Readings from Last 3 Encounters:   02/08/19 125/83   01/17/18 127/83   11/12/17 115/79         Current Outpatient Medications   Medication     albuterol (VENTOLIN HFA) 108 (90 BASE) MCG/ACT Inhaler     aspirin 81 MG chewable tablet     No current facility-administered medications for this visit.        Social History     Tobacco Use     Smoking status: Never Smoker     Smokeless tobacco: Former User     Types: Chew   Substance Use Topics     Alcohol use: No     Alcohol/week: 0.0 oz     Comment: mostly on weekends, 1-2 drinks     Drug use: No       Health Maintenance Due   Topic Date Due     HIV SCREEN (SYSTEM ASSIGNED)  08/07/1977     HEPATITIS C SCREENING  08/07/1977     COLON CANCER SCREEN (SYSTEM ASSIGNED)  08/07/2009     ZOSTER IMMUNIZATION (1 of 2) 08/07/2009     ADVANCE DIRECTIVE PLANNING Q5 YRS  08/07/2014     PHQ-2 Q1 YR  01/17/2019       No results found for: PAP      February 8, 2019 1:53 PM

## 2019-02-08 NOTE — PROGRESS NOTES
"Tommy Lerner is a 58 year old male here for the following issues:    Insomnia  Jaime is a 59 yo male in good health, he reports a 1 year history of waking up at 3 am every day. He is not able to fall back asleep after waking up.  He has a hx of sleep apnea, mask fits well. He is having 4-5 hours of sleep at night. He goes to bed at the same time at night, 10:30. He is going through a divorce and thinks anxiety is contributing to his insomnia. He exercises regularly, skiing.  Limiting his screen time before bed. He has 1-2 cups of coffee in the morning, 1-2 beers at night. No restless leg syndrome. He has done counseling in the past and will consider this again.     Shingles vaccine  Jaime is asking for the Shingrix vaccine today. The vaccine is not available in clinic at this time, he will contact his pharmacy.     Erectile Dysfunction  He is asking for a Viagra refill today.     Cough  Jaime reports a chronic post nasal drip and nasal drainage. He has a dry cough because of the post nasal drip. He is asking for an albuterol refill today. Cold is not triggered by cold air. Nonsmoker. He does not take antihistamines.     Exercise intolerance  Jaime is a cross country skier and uses roller-skis in the summer. He has noticed that lately \"doesn't have the extra push\" when exercising. He had to cut his exercise short because he was coughing 2 days ago. He knows he isn't sleeping well and it is contributing to this change in exercise.    GERD  He reports a history of GERD. He does not take medications to treat symptoms. He avoids eating late at night.  No dysphagia or dark stools.     HCM  Jaime is due for colon cancer screening, no family history of colon cancer. He reports constipation, which is unusual for him. HIV screening completed in 1995.     Patient Active Problem List   Diagnosis     WALDEMAR (obstructive sleep apnea)       Current Outpatient Medications   Medication Sig Dispense Refill     albuterol (VENTOLIN HFA) " 108 (90 Base) MCG/ACT inhaler Inhale 2 puffs into the lungs every 4 hours as needed for shortness of breath / dyspnea or wheezing 1 Inhaler 11     sildenafil (VIAGRA) 100 MG tablet Take 1 tablet (100 mg) by mouth daily as needed (ED) Take 30 min to 4 hours before intercourse.  Never use with nitroglycerin, terazosin or doxazosin. 30 tablet 11     aspirin 81 MG chewable tablet Take 81 mg by mouth daily         No Known Allergies     EXAM  /83   Pulse 63   Temp 97.7  F (36.5  C) (Oral)   SpO2 98%   Gen: Alert, pleasant, NAD  HEENT:  Conjunctiva nl, TM normal bilaterally, OP clear, no posterior erythema  Neck: No lymphadenopathy   COR: S1,S2, no murmur  Lungs: CTA bilaterally, no rhonchi, wheezes or rales  Abdomen: Soft, non tender, normal bowel sounds, no HSM or mass  Ext: no peripheral edema, pulses full  Neuro: DTR +2/4 in all extremities      Assessment:  (F43.22) Adjustment disorder with anxious mood  (primary encounter diagnosis)  Comment: currently going through divorce, some sleep disturbance  Plan: recommend formal counseling. Consider medication if symptoms not clearing.    (N52.9) Erectile dysfunction, unspecified erectile dysfunction type  Comment: per pt request  Plan: sildenafil (VIAGRA) 100 MG tablet        Refilled medication    (R05) Cough  Comment: reactive cough  Plan: albuterol (VENTOLIN HFA) 108 (90 Base) MCG/ACT         inhaler        Refilled albuterol inhaler, gave instructions on use    (Z12.11) Screen for colon cancer  Comment: needs baseline study  Plan: GASTROENTEROLOGY ADULT REF PROCEDURE ONLY         [facility] [phone]        Referral is given    (Z12.5) Screening for prostate cancer  Comment: routine screening  Plan: Prostate spec antigen screen            (Z11.59) Need for hepatitis C screening test  Comment: routine screening  Plan: Hepatitis C Screen Reflex to HCV RNA Quant and         Genotype            (Z13.0) Screening for deficiency anemia  Comment: routine  screening  Plan: CBC with platelets        If low, the push for colonoscopy    (R68.89) Exercise intolerance  Comment: he reports change in exercise tolerance  Plan: SPORTS MEDICINE REFERRAL        Refer to sports medicine clinic for evaluation    (Z13.9) Screening for condition  Comment: not fasting  Plan: Comprehensive Metabolic Panel ([place])       Check baseline kidney and liver tests    (Z13.220) Lipid screening  Comment: screening, nonfasting  Plan: Lipid Profile            Shae Brumfield MD  Internal Medicine/Pediatrics      I, Flora Reddy, am serving as a scribe to document services personally performed by Dr. Shae Brumfield, based on data collection and the provider's statements to me. Dr. Brumfield has reviewed, edited, and approved the above note.

## 2019-02-11 LAB — HCV AB SERPL QL IA: NONREACTIVE

## 2019-07-11 DIAGNOSIS — N52.9 ERECTILE DYSFUNCTION, UNSPECIFIED ERECTILE DYSFUNCTION TYPE: ICD-10-CM

## 2019-07-11 RX ORDER — SILDENAFIL 100 MG/1
100 TABLET, FILM COATED ORAL DAILY PRN
Qty: 30 TABLET | Refills: 5 | Status: SHIPPED | OUTPATIENT
Start: 2019-07-11 | End: 2020-07-23

## 2019-07-11 NOTE — TELEPHONE ENCOUNTER
Transfer [facility] script to [facility] on [address] in [facility].  Jenna Dwyer RN  [facility]

## 2019-07-15 ENCOUNTER — TRANSFERRED RECORDS (OUTPATIENT)
Dept: HEALTH INFORMATION MANAGEMENT | Facility: CLINIC | Age: 59
End: 2019-07-15

## 2019-11-08 ENCOUNTER — OFFICE VISIT (OUTPATIENT)
Dept: FAMILY MEDICINE | Facility: CLINIC | Age: 59
End: 2019-11-08
Payer: COMMERCIAL

## 2019-11-08 VITALS
DIASTOLIC BLOOD PRESSURE: 81 MMHG | HEIGHT: 71 IN | BODY MASS INDEX: 23.66 KG/M2 | SYSTOLIC BLOOD PRESSURE: 123 MMHG | OXYGEN SATURATION: 97 % | HEART RATE: 65 BPM | WEIGHT: 169 LBS | RESPIRATION RATE: 16 BRPM | TEMPERATURE: 97.4 F

## 2019-11-08 DIAGNOSIS — R20.0 NUMBNESS OF TONGUE: ICD-10-CM

## 2019-11-08 DIAGNOSIS — R26.89 BALANCE PROBLEMS: Primary | ICD-10-CM

## 2019-11-08 DIAGNOSIS — R43.0 ANOSMIA: ICD-10-CM

## 2019-11-08 LAB
BUN SERPL-MCNC: 18 MG/DL (ref 7–30)
CALCIUM SERPL-MCNC: 9.2 MG/DL (ref 8.5–10.4)
CHLORIDE SERPLBLD-SCNC: 106 MMOL/L (ref 94–109)
CO2 SERPL-SCNC: 30 MMOL/L (ref 20–32)
CREAT SERPL-MCNC: 0.8 MG/DL (ref 0.8–1.5)
EGFR CALCULATED (BLACK REFERENCE): 126.8
EGFR CALCULATED (NON BLACK REFERENCE): 104.8
GLUCOSE SERPL-MCNC: 95 MG/DL (ref 60–99)
POTASSIUM SERPL-SCNC: 3.9 MMOL/L (ref 3.4–5.3)
SODIUM SERPL-SCNC: 146 MMOL/L (ref 137.3–146.3)

## 2019-11-08 ASSESSMENT — MIFFLIN-ST. JEOR: SCORE: 1598.45

## 2019-11-08 NOTE — PROGRESS NOTES
"Tommy Lerner is a 59 year old male here for the following issues:       Problems with balance  Jaime is a 60 yo male who is typically in excellent health. He is an avid marathon skiier and roller skis in warmer weather. Over the past 2 wks he developed onset of difficulty with his balance. This is not usually a problem for him but he found himself \"holding on to things\" to steady himself. No near syncope or palpitations. Other athletes noted his loss of balance and were surprised. No hx of vertigo. No sinus congestion or ear fullness. No tinnitus. No nausea. No headaches. He denies leola weakness of his extremities but report the issues with balance have made him \"concentrate\" on his walking. He denies paresthesias. No low back pain. Symptoms have been unchanged in severity since they began 2 wks ago, static.    Numbness of tongue  During the past 2 wks he has also noted numbness at the lateral aspect of his tongue, Left side >right. No difficulty speaking or swallowing.     Anosmia  He reports several year history of dampened sense of smell. Does not believe it is associated with loss of taste. No hx of head trauma. He reports he thought this was \"a normal part of aging\". No hx of nasal polyps or sinus surgery.     Patient Active Problem List   Diagnosis     WALDEMAR (obstructive sleep apnea)       Current Outpatient Medications   Medication Sig Dispense Refill     albuterol (VENTOLIN HFA) 108 (90 Base) MCG/ACT inhaler Inhale 2 puffs into the lungs every 4 hours as needed for shortness of breath / dyspnea or wheezing 1 Inhaler 11     aspirin 81 MG chewable tablet Take 81 mg by mouth daily       sildenafil (VIAGRA) 100 MG tablet Take 1 tablet (100 mg) by mouth daily as needed (ED) Take 30min- 4 hrs before intercourse.No use with nitroglycerin, terazosin or doxazosin. 30 tablet 5       No Known Allergies     EXAM  BP (!) 144/91   Pulse 65   Temp 97.4  F (36.3  C) (Oral)   Resp 16   Ht 1.803 m (5' 10.98\")   Wt " 76.7 kg (169 lb)   SpO2 97%   BMI 23.58 kg/m    Gen: Alert, pleasant, NAD  HEENT:  Conjunctiva nl, TM normal bilaterally, Nares are clear, no TTP over sinuses. OP clear, no posterior erythema  Eyes: NO nystagmus  COR: S1,S2, no murmur, no ectopy  Lungs: CTA bilaterally, no rhonchi, wheezes or rales  Ext: no peripheral edema, pulses full  Neuro: CN 2-12 grossly intact  CN 2-12 intact except subjective numbness over lateral aspect of tongue, L>R  DTR +2/4 in all extremities  Some difficulty with tandem gait, some difficulty with walking on toes, heel walking is easier  Negative Rhomberg , FNF and DANGELO are normal.     Assessment:  (R26.89) Balance problems  (primary encounter diagnosis)  Comment: new onset, persistent symptoms, does not appear consistent with vertigo.  Plan: MR Brain for Stroke Cmpl w/o & w [place]ras,         NEUROLOGY ADULT REFERRAL, Basic Metabolic Panel        ([place]), CANCELED: Basic metabolic panel        Obtain MRI of brain with/ WO contrast, referral to neurology, to ER for any acute escalation of symptoms or new neuro deficits.     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

## 2019-11-08 NOTE — NURSING NOTE
"59 year old  Chief Complaint   Patient presents with     Dizziness     Numbness     on his tongue. Reports he feels weird overall, has been on going for a few weeks now.        Blood pressure (!) 144/91, pulse 65, temperature 97.4  F (36.3  C), temperature source Oral, resp. rate 16, height 1.803 m (5' 10.98\"), weight 76.7 kg (169 lb), SpO2 97 %. Body mass index is 23.58 kg/m .  Patient Active Problem List   Diagnosis     WALDEMAR (obstructive sleep apnea)       Wt Readings from Last 2 Encounters:   11/08/19 76.7 kg (169 lb)   01/17/18 75.3 kg (166 lb)     BP Readings from Last 3 Encounters:   11/08/19 (!) 144/91   02/08/19 125/83   01/17/18 127/83         Current Outpatient Medications   Medication     albuterol (VENTOLIN HFA) 108 (90 Base) MCG/ACT inhaler     aspirin 81 MG chewable tablet     sildenafil (VIAGRA) 100 MG tablet     No current facility-administered medications for this visit.        Social History     Tobacco Use     Smoking status: Never Smoker     Smokeless tobacco: Former User     Types: Chew   Substance Use Topics     Alcohol use: No     Alcohol/week: 0.0 standard drinks     Comment: mostly on weekends, 1-2 drinks     Drug use: No       Health Maintenance Due   Topic Date Due     ADVANCE CARE PLANNING  1960     COLONOSCOPY  08/07/1969     PREVENTIVE CARE VISIT  06/24/2016     PHQ-2  01/01/2019       No results found for: PAP      November 8, 2019 2:25 PM  "

## 2019-11-11 ENCOUNTER — DOCUMENTATION ONLY (OUTPATIENT)
Dept: CARE COORDINATION | Facility: CLINIC | Age: 59
End: 2019-11-11

## 2019-11-18 ENCOUNTER — PRE VISIT (OUTPATIENT)
Dept: NEUROLOGY | Facility: CLINIC | Age: 59
End: 2019-11-18

## 2019-11-18 NOTE — TELEPHONE ENCOUNTER
FUTURE VISIT INFORMATION      FUTURE VISIT INFORMATION:    Date: 12/26/2019    Time: 1pm    Location: [facility]  REFERRAL INFORMATION:    Referring provider:  Dr. Brumfield     Referring providers clinic:  [facility]     Reason for visit/diagnosis  Balance Problems, Anosmia     RECORDS REQUESTED FROM:       Cl[facility] name Comments Records Status Imaging [facility]   [facility]    [facility]  Care Everywhere N/A

## 2019-11-20 ENCOUNTER — ANCILLARY PROCEDURE (OUTPATIENT)
Dept: MRI IMAGING | Facility: CLINIC | Age: 59
End: 2019-11-20
Attending: INTERNAL MEDICINE
Payer: COMMERCIAL

## 2019-11-20 DIAGNOSIS — R43.0 ANOSMIA: ICD-10-CM

## 2019-11-20 DIAGNOSIS — R26.89 BALANCE PROBLEMS: ICD-10-CM

## 2019-11-20 DIAGNOSIS — R20.0 NUMBNESS OF TONGUE: ICD-10-CM

## 2019-11-20 RX ORDER — GADOBUTROL 604.72 MG/ML
7.5 INJECTION INTRAVENOUS ONCE
Status: COMPLETED | OUTPATIENT
Start: 2019-11-20 | End: 2019-11-20

## 2019-11-20 RX ADMIN — GADOBUTROL 7.5 ML: 604.72 INJECTION INTRAVENOUS at 17:09

## 2019-11-22 ENCOUNTER — CARE COORDINATION (OUTPATIENT)
Dept: NEUROSURGERY | Facility: CLINIC | Age: 59
End: 2019-11-22

## 2019-11-22 ENCOUNTER — TELEPHONE (OUTPATIENT)
Dept: FAMILY MEDICINE | Facility: CLINIC | Age: 59
End: 2019-11-22

## 2019-11-22 NOTE — TELEPHONE ENCOUNTER
Dr Brumfield reviewed MRi/MRA of brain from 11/20/19 with pt - he has an arachnoid cyst , measuring about 2 x 5 cm displacing brain stem. Pt has new balance issues in recent weeks, tongue numbness,and anosmia. He has upcoming neurology appt, but now that the MRI/MRA is back, Dr Brumfield would like neurosurgery consult. She requests visit with a provider there within 2 weeks.   Please review Dr Brumfield's note and the MRI/MRA done 11/20. You may call pt for appt; let us know if you have questions.    Thanks,  Jenna Dwyer RN  [facility]

## 2019-11-26 NOTE — TELEPHONE ENCOUNTER
Urgent - current patient. Has a neurology appointment on 12/26/2019. Per provider MRI results indicative of patient seeing a neurosurgeon/Stroke/Brain within 2 weeks  This was entered into chart by Sayda acuña LPN last week.    Pt still in need of neurosurgery appt in the next week -please let us know if you have questions.  Jenna Dwyer RN  [facility]

## 2019-12-02 NOTE — TELEPHONE ENCOUNTER
Pt still waiting to be contacted about an appt in neurosurgery - please see previous notes - he needs appt this week, if possible.   Please see previous documentation, and contact pt. He sent [employer] message 11/29 to Dr Brumfield re: this.   Thanks,  Jenna Dwyer RN  [facility]

## 2019-12-25 ASSESSMENT — ENCOUNTER SYMPTOMS
TREMORS: 1
TINGLING: 1
SPEECH CHANGE: 0
TINGLING: 1
CONSTIPATION: 1
DIZZINESS: 1
SPEECH CHANGE: 0
NUMBNESS: 1
SEIZURES: 0
LOSS OF CONSCIOUSNESS: 0
WEAKNESS: 1
MEMORY LOSS: 0
LOSS OF CONSCIOUSNESS: 0
PARALYSIS: 0
DIZZINESS: 1
CONSTIPATION: 1
HEADACHES: 0
TREMORS: 1
SEIZURES: 0
HEADACHES: 0
MEMORY LOSS: 0
WEAKNESS: 1
NUMBNESS: 1
PARALYSIS: 0

## 2019-12-26 ENCOUNTER — OFFICE VISIT (OUTPATIENT)
Dept: NEUROLOGY | Facility: CLINIC | Age: 59
End: 2019-12-26
Attending: INTERNAL MEDICINE
Payer: COMMERCIAL

## 2019-12-26 VITALS
HEART RATE: 77 BPM | OXYGEN SATURATION: 99 % | SYSTOLIC BLOOD PRESSURE: 146 MMHG | BODY MASS INDEX: 24.56 KG/M2 | WEIGHT: 176 LBS | DIASTOLIC BLOOD PRESSURE: 86 MMHG

## 2019-12-26 DIAGNOSIS — G93.0 INTRACRANIAL ARACHNOID CYST: ICD-10-CM

## 2019-12-26 DIAGNOSIS — G93.0 INTRACRANIAL ARACHNOID CYST: Primary | ICD-10-CM

## 2019-12-26 LAB
ALBUMIN SERPL-MCNC: 4.1 G/DL (ref 3.4–5)
ALBUMIN UR-MCNC: NEGATIVE MG/DL
ALP SERPL-CCNC: 68 U/L (ref 40–150)
ALT SERPL W P-5'-P-CCNC: 33 U/L (ref 0–70)
ANION GAP SERPL CALCULATED.3IONS-SCNC: 2 MMOL/L (ref 3–14)
APPEARANCE UR: CLEAR
AST SERPL W P-5'-P-CCNC: 15 U/L (ref 0–45)
BILIRUB SERPL-MCNC: 0.6 MG/DL (ref 0.2–1.3)
BILIRUB UR QL STRIP: NEGATIVE
BUN SERPL-MCNC: 19 MG/DL (ref 7–30)
CALCIUM SERPL-MCNC: 9 MG/DL (ref 8.5–10.1)
CHLORIDE SERPL-SCNC: 108 MMOL/L (ref 94–109)
CK SERPL-CCNC: 71 U/L (ref 30–300)
CO2 SERPL-SCNC: 30 MMOL/L (ref 20–32)
COLOR UR AUTO: YELLOW
CREAT SERPL-MCNC: 1.12 MG/DL (ref 0.66–1.25)
CRP SERPL-MCNC: <2.9 MG/L (ref 0–8)
ERYTHROCYTE [DISTWIDTH] IN BLOOD BY AUTOMATED COUNT: 12.6 % (ref 10–15)
ERYTHROCYTE [SEDIMENTATION RATE] IN BLOOD BY WESTERGREN METHOD: 4 MM/H (ref 0–20)
GFR SERPL CREATININE-BSD FRML MDRD: 71 ML/MIN/{1.73_M2}
GLUCOSE SERPL-MCNC: 87 MG/DL (ref 70–99)
GLUCOSE UR STRIP-MCNC: NEGATIVE MG/DL
HBA1C MFR BLD: 5.7 % (ref 0–5.6)
HCT VFR BLD AUTO: 47 % (ref 40–53)
HGB BLD-MCNC: 16 G/DL (ref 13.3–17.7)
HGB UR QL STRIP: NEGATIVE
KETONES UR STRIP-MCNC: NEGATIVE MG/DL
LEUKOCYTE ESTERASE UR QL STRIP: NEGATIVE
MCH RBC QN AUTO: 31.4 PG (ref 26.5–33)
MCHC RBC AUTO-ENTMCNC: 34 G/DL (ref 31.5–36.5)
MCV RBC AUTO: 92 FL (ref 78–100)
MUCOUS THREADS #/AREA URNS LPF: PRESENT /LPF
NITRATE UR QL: NEGATIVE
PH UR STRIP: 6 PH (ref 5–7)
PLATELET # BLD AUTO: 227 10E9/L (ref 150–450)
POTASSIUM SERPL-SCNC: 3.9 MMOL/L (ref 3.4–5.3)
PROT SERPL-MCNC: 7.2 G/DL (ref 6.8–8.8)
RBC # BLD AUTO: 5.1 10E12/L (ref 4.4–5.9)
RBC #/AREA URNS AUTO: 2 /HPF (ref 0–2)
SODIUM SERPL-SCNC: 140 MMOL/L (ref 133–144)
SOURCE: ABNORMAL
SP GR UR STRIP: 1.02 (ref 1–1.03)
UROBILINOGEN UR STRIP-MCNC: 0 MG/DL (ref 0–2)
WBC # BLD AUTO: 5.6 10E9/L (ref 4–11)
WBC #/AREA URNS AUTO: <1 /HPF (ref 0–5)

## 2019-12-26 ASSESSMENT — PAIN SCALES - GENERAL: PAINLEVEL: NO PAIN (0)

## 2019-12-26 NOTE — LETTER
2019      RE: Tommy Lerner  [address]  [address]  [place] [address]       Service Date: 2019      Shae Brumfield MD   [place] [address] [place] [address] [place] [address]      RE: Tommy Lerner   MRN: [id number]   : 1960      Dear Dr. Brumfield:      Mr. 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  He has also had mild dizziness with turning, but is not turning in any particular circumstances.  He feels his lower extremity is weak and not as strong as before and he has slowed down in some of his cross country abilities and he does not think this is age-related.      He says his legs sometimes hurt with exercising.  He also reports some numbness in his hands and feet and this has been also since October.  He lost his sense of smell partly a few years ago and does not know if it was related to a cold or anything but he says now also his tongue is numb on both sides and he also reports some numbness in the tip of the fingers of both hands, not particularly in the median distribution.  He describes some tremors in the face and the right side, constipation, difficulty holding his bladder.  Sex and bowels are fine.      PAST MEDICAL HISTORY:  Indicates he has been healthy man.  He has had some history of anxiety.  No alcohol of significance except occasionally, sometimes once a day.  His diet is not vegetarian.  He is a marathon runner.      ALLERGIES:  None.        His diagnoses are basically none.      ALLERGIES:  He has no known allergies.      FAMILY  HISTORY:  Negative for MS.  There is diabetes in the family.  No neuropathy, nerve problems or neuromuscular disease.  His grandfather had pancreatic cancer.  His father  of some sort of interstitial lung disease.  No history of sarcoid.      SOCIAL HISTORY:  He is a .  He is under heavy stress as he is in the process of a divorce.      PHYSICAL EXAMINATION:  Shows an extremely pleasant man, looking very fit.  His vital signs are normal.  His motoric exam is completely normal except he does have bilateral weakness of the abductor pollicis brevis, but not of the opponens pollicis.  His sensory exam is questionable, looks kind of sketchy and perhaps in the right leg there is a mild distal loss.  Vibratory sensation seems to be decreased on the right foot, suggesting subtle signs of a sensory neuropathy.  Reflexes are fine.  There is no myotonia.  There is no tightness of his muscles.  He is very strong.  Cranial nerves are all normal.  Fundus is good.  No cranial nerve finding.  His neck is good.  No Lhermitte sign.  Coordination is very good.      In summary, the patient complains of some weakness on marathon running and of some paresthesias and the only finding on his exam is he has had weakness of the APBs bilaterally and a positive Tinel sign on the left, suggesting carpal tunnel and possibly mild sensory neuropathy, especially vibratory sensation.  He does have a large arachnoid cyst which sits in front of the brain stem and this has a mass effect which is probably asymptomatic.  He has been seen by Neurosurgery for this .  We will proceed with electrophysiological studies and order laboratory appropriate for neuropathy and we will also do a brain stem auditory evoked response to see if there is any compression in the brain stem from the arachnoid cyst.  We will see him again after all the studies are completed.  Anxiety of a pending divorce could be an issue as well.  We will see him       Sincerely,      MD RAHUL White MD             D: 2019   T: 2019   MT: WILLIAM      Name:     ANETA MELGAR   MRN:      [id number]        Account:      [id number]   :      1960           Service Date: 2019      Document: [id number]       Rahul Ramirez MD

## 2019-12-26 NOTE — NURSING NOTE
Chief Complaint   Patient presents with     New Patient     [facility] NEW BALANCE PROBLEMS, ANOSMIA, NUMBNESS OF TONGUE       Ysabel Escobar, EMT

## 2019-12-26 NOTE — PROGRESS NOTES
Answers for HPI/ROS submitted by the patient on 12/25/2019   General Symptoms: No  Skin Symptoms: No  HENT Symptoms: No  EYE SYMPTOMS: No  HEART SYMPTOMS: No  LUNG SYMPTOMS: No  INTESTINAL SYMPTOMS: Yes  URINARY SYMPTOMS: No  REPRODUCTIVE SYMPTOMS: No  SKELETAL SYMPTOMS: No  BLOOD SYMPTOMS: No  NERVOUS SYSTEM SYMPTOMS: Yes  MENTAL HEALTH SYMPTOMS: No  Constipation: Yes  Dizziness or trouble with balance: Yes  Fainting or black-out spells: No  Memory loss: No  Headache: No  Seizures: No  Speech problems: No  Tingling: Yes  Tremor: Yes  Weakness: Yes  Difficulty walking: No  Paralysis: No  Numbness: Yes

## 2019-12-26 NOTE — LETTER
12/26/2019       RE: Tommy Lerner  [address]  [address]  [place] [address]     Dear Colleague,    Thank you for referring your patient, Tommy Lerner, to the [facility] NEUROLOGY at [facility]. Please see a copy of my visit note below.      Answers for HPI/ROS submitted by the patient on 12/25/2019   General Symptoms: No  Skin Symptoms: No  HENT Symptoms: No  EYE SYMPTOMS: No  HEART SYMPTOMS: No  LUNG SYMPTOMS: No  INTESTINAL SYMPTOMS: Yes  URINARY SYMPTOMS: No  REPRODUCTIVE SYMPTOMS: No  SKELETAL SYMPTOMS: No  BLOOD SYMPTOMS: No  NERVOUS SYSTEM SYMPTOMS: Yes  MENTAL HEALTH SYMPTOMS: No  Constipation: Yes  Dizziness or trouble with balance: Yes  Fainting or black-out spells: No  Memory loss: No  Headache: No  Seizures: No  Speech problems: No  Tingling: Yes  Tremor: Yes  Weakness: Yes  Difficulty walking: No  Paralysis: No  Numbness: Yes      Again, thank you for allowing me to participate in the care of your patient.      Sincerely,    Rahul Ramirez MD

## 2019-12-27 NOTE — PROGRESS NOTES
Service Date: 2019      Shae Brumfield MD   [facility]    [address] [place] [address] [place] [address]      RE: Tommy Lerner   MRN: [id number]   : 1960      Dear Dr. Brumfield:      Mr. 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  He has also had mild dizziness with turning, but is not turning in any particular circumstances.  He feels his lower extremity is weak and not as strong as before and he has slowed down in some of his cross country abilities and he does not think this is age-related.      He says his legs sometimes hurt with exercising.  He also reports some numbness in his hands and feet and this has been also since October.  He lost his sense of smell partly a few years ago and does not know if it was related to a cold or anything but he says now also his tongue is numb on both sides and he also reports some numbness in the tip of the fingers of both hands, not particularly in the median distribution.  He describes some tremors in the face and the right side, constipation, difficulty holding his bladder.  Sex and bowels are fine.      PAST MEDICAL HISTORY:  Indicates he has been healthy man.  He has had some history of anxiety.  No alcohol of significance except occasionally, sometimes once a day.  His diet is not vegetarian.  He is a marathon runner.      ALLERGIES:  None.        His diagnoses are basically none.      ALLERGIES:  He has no known allergies.      FAMILY HISTORY:  Negative for MS.  There is diabetes in the family.  No neuropathy, nerve problems or  neuromuscular disease.  His grandfather had pancreatic cancer.  His father  of some sort of interstitial lung disease.  No history of sarcoid.      SOCIAL HISTORY:  He is a .  He is under heavy stress as he is in the process of a divorce.      PHYSICAL EXAMINATION:  Shows an extremely pleasant man, looking very fit.  His vital signs are normal.  His motoric exam is completely normal except he does have bilateral weakness of the abductor pollicis brevis, but not of the opponens pollicis.  His sensory exam is questionable, looks kind of sketchy and perhaps in the right leg there is a mild distal loss.  Vibratory sensation seems to be decreased on the right foot, suggesting subtle signs of a sensory neuropathy.  Reflexes are fine.  There is no myotonia.  There is no tightness of his muscles.  He is very strong.  Cranial nerves are all normal.  Fundus is good.  No cranial nerve finding.  His neck is good.  No Lhermitte sign.  Coordination is very good.      In summary, the patient complains of some weakness on marathon running and of some paresthesias and the only finding on his exam is he has had weakness of the APBs bilaterally and a positive Tinel sign on the left, suggesting carpal tunnel and possibly mild sensory neuropathy, especially vibratory sensation.  He does have a large arachnoid cyst which sits in front of the brain stem and this has a mass effect which is probably asymptomatic.  He has been seen by Neurosurgery for this .  We will proceed with electrophysiological studies and order laboratory appropriate for neuropathy and we will also do a brain stem auditory evoked response to see if there is any compression in the brain stem from the arachnoid cyst.  We will see him again after all the studies are completed.  Anxiety of a pending divorce could be an issue as well.  We will see him      Sincerely,      MD JOHANA White MD             D: 2019   T: 2019    MT: WILLIAM      Name:     ANETA MELGAR   MRN:      [id number]        Account:      [id number]   :      1960           Service Date: 2019      Document: [id number]

## 2019-12-28 LAB
ACHR BIND AB SER-SCNC: 0 NMOL/L (ref 0–0.4)
ACHR MOD AB/ACHR TOTAL SFR SER: 0 %

## 2020-01-02 ENCOUNTER — PRE VISIT (OUTPATIENT)
Dept: NEUROSURGERY | Facility: CLINIC | Age: 60
End: 2020-01-02

## 2020-01-02 NOTE — TELEPHONE ENCOUNTER
FUTURE VISIT INFORMATION      FUTURE VISIT INFORMATION:    Date: 1/7/2020    Time: 130pm    Location: [facility] [place]  REFERRAL INFORMATION:    Referring provider:  Dr. Brumfield     Referring providers clinic:  [facility]     Reason for visit/diagnosis  Intracranial Arachnoid Cyst     RECORDS REQUESTED FROM:       Clinic name Comments Records Status Imaging Status   [facility]  [facility] Every[facility] N/A    [facility]  Care Everywhere N/A

## 2020-01-07 ENCOUNTER — OFFICE VISIT (OUTPATIENT)
Dept: NEUROSURGERY | Facility: CLINIC | Age: 60
End: 2020-01-07
Payer: COMMERCIAL

## 2020-01-07 VITALS
SYSTOLIC BLOOD PRESSURE: 142 MMHG | BODY MASS INDEX: 23.8 KG/M2 | DIASTOLIC BLOOD PRESSURE: 87 MMHG | HEIGHT: 71 IN | WEIGHT: 170 LBS | HEART RATE: 69 BPM | OXYGEN SATURATION: 95 %

## 2020-01-07 DIAGNOSIS — G93.0 INTRACRANIAL ARACHNOID CYST: Primary | ICD-10-CM

## 2020-01-07 RX ORDER — ACETAMINOPHEN 160 MG
2000 TABLET,DISINTEGRATING ORAL DAILY
COMMUNITY
End: 2021-03-17

## 2020-01-07 ASSESSMENT — MIFFLIN-ST. JEOR: SCORE: 1603.24

## 2020-01-07 ASSESSMENT — PAIN SCALES - GENERAL: PAINLEVEL: NO PAIN (0)

## 2020-01-07 NOTE — PROGRESS NOTES
1/7/2020  Neurosurgery Clinic Visit    History of present illness: Tommy Lerner is a 59 year old male who presents for assessment of an arachnoid cyst that was found on MRI. 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 He states for the past few months, he has been noticing trouble with balance, progressive numbness that started in his feet and hands that are marching up, progressive weakness that he notices while working out, very slight trouble initiating urination, and an odd pain in his thigh. He states though his symptoms are not profound he is not able to do as much as he used to and is declining faster than his peers while working out. He also has some tongue numbness as well. He denies any changes in vision or loss of bowel or bladder control. He denies any other neurologic symptoms.    Past Medical History:   Past Medical History:   Diagnosis Date     NO ACTIVE PROBLEMS        Surgical History:   Past Surgical History:   Procedure Laterality Date     vasectomy         Social history:   Social History     Tobacco Use     Smoking status: Never Smoker     Smokeless tobacco: Former User     Types: Chew   Substance Use Topics     Alcohol use: Yes     Alcohol/week: 0.0 standard drinks     Comment: mostly on weekends, 1-2 drinks     Drug use: No       Family history:   Family History   Problem Relation Age of Onset     Pancreatic Cancer Maternal Grandmother      Hyperlipidemia Father      Other - See Comments Father         idiopathic pulmonary fibrosis     Other - See Comments Other         Etoh dependence, dad and brother     Diabetes Other         m [facility], type I     Other - See Comments Mother 80        memory loss     Sleep Apnea Mother        Medications:  Current Outpatient Medications   Medication     albuterol (VENTOLIN HFA) 108 (90 Base) MCG/ACT inhaler     Cholecalciferol (VITAMIN D3) 50 MCG (2000 UT) CAPS     MAGNESIUM PO     Omega-3 Fatty Acids (FISH OIL PO)  "    sildenafil (VIAGRA) 100 MG tablet     aspirin 81 MG chewable tablet     No current facility-administered medications for this visit.        Allergies:   No Known Allergies    Review of systems: 10 point ROS negative except for as detailed in HPI    Physical exam:   BP (!) 142/87 (BP Location: Left arm, Patient Position: Sitting, Cuff Size: Adult Regular)   Pulse 69   Ht 1.803 m (5' 11\")   Wt 77.1 kg (170 lb)   SpO2 95%   BMI 23.71 kg/m      General: Awake and alert and in no acute distress.  Pulm: Breathing comfortably on room air  CN: Symmetric browlift, smile, tongue protrusion, palate elevation, and trapezius. No dysarthria. Extraocular muscles are all intact.  Motor: Good muscle bulk throughout. 5 out of 5 strength in bilateral upper and lower extremities  Sensation: Sensation grossly intact to light touch in all extremities.  Gait: Intact tandem gait.  Reflexes: 2+ reflexes bilateral biceps, brachioradialis, and patellar tendons. Galvan's reflex negative. Bilateral clonus negative.   Coordination: Very mild dysmetria in right hand worse than left.    Imaging:  Prepontine arachnoid cyst present which is worse on the right side. Has deformed the clivus around it as well with mass effect on the brainstem.    Assessment:  #59 year old male with a subarachnoid cyst that appears to be symptomatic    Plan:  -Refer to Dr. Ahn for assessment on approach and benefit of intervention on this cyst.    Patient seen and discussed with Dr. Brian MD  Agree with resident's note.  Seen and examined today with team.  MD Scotty Erwin MD  Neurosurgery Resident PGY-1  "

## 2020-01-07 NOTE — NURSING NOTE
Chief Complaint   Patient presents with     Consult     [facility] NEW - ARACHNOID CYST; ANOSMIA; BALANCE PROBLEMS       Adalid Tamayo, EMT

## 2020-01-07 NOTE — PATIENT INSTRUCTIONS
Referral to Dr Tommy Ahn for Intracranial Arachnoid Cyst.  Scheduled for 1/15/20 @ 6004 with Dr Ahn.    Call Shakira  [phone] for issues or concerns    Thank you for using [employer] for your healthcare needs.

## 2020-01-07 NOTE — LETTER
RE: Tommy Lerner  [address]  [address]  [place] [address]     Dear Colleague,    Thank you for referring your patient, Tommy Lerner, to the [facility] NEUROSURGERY at [facility]. Please see a copy of my visit note below.    1/7/2020  Neurosurgery Clinic Visit    History of present illness: Tommy Lerner is a 59 year old male who presents for assessment of an arachnoid cyst that was found on MRI. 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 He states for the past few months, he has been noticing trouble with balance, progressive numbness that started in his feet and hands that are marching up, progressive weakness that he notices while working out, very slight trouble initiating urination, and an odd pain in his thigh. He states though his symptoms are not profound he is not able to do as much as he used to and is declining faster than his peers while working out. He also has some tongue numbness as well. He denies any changes in vision or loss of bowel or bladder control. He denies any other neurologic symptoms.    Past Medical History:   Past Medical History:   Diagnosis Date     NO ACTIVE PROBLEMS        Surgical History:   Past Surgical History:   Procedure Laterality Date     vasectomy         Social history:   Social History     Tobacco Use     Smoking status: Never Smoker     Smokeless tobacco: Former User     Types: Chew   Substance Use Topics     Alcohol use: Yes     Alcohol/week: 0.0 standard drinks     Comment: mostly on weekends, 1-2 drinks     Drug use: No       Family history:   Family History   Problem Relation Age of Onset     Pancreatic Cancer Maternal Grandmother      Hyperlipidemia Father      Other - See Comments Father         idiopathic pulmonary fibrosis     Other - See Comments Other         Etoh dependence, dad and brother     Diabetes Other         m [facility], type I     Other - See Comments Mother  "80        memory loss     Sleep Apnea Mother        Medications:  Current Outpatient Medications   Medication     albuterol (VENTOLIN HFA) 108 (90 Base) MCG/ACT inhaler     Cholecalciferol (VITAMIN D3) 50 MCG (2000 UT) CAPS     MAGNESIUM PO     Omega-3 Fatty Acids (FISH OIL PO)     sildenafil (VIAGRA) 100 MG tablet     aspirin 81 MG chewable tablet     No current facility-administered medications for this visit.        Allergies:   No Known Allergies    Review of systems: 10 point ROS negative except for as detailed in HPI    Physical exam:   BP (!) 142/87 (BP Location: Left arm, Patient Position: Sitting, Cuff Size: Adult Regular)   Pulse 69   Ht 1.803 m (5' 11\")   Wt 77.1 kg (170 lb)   SpO2 95%   BMI 23.71 kg/m       General: Awake and alert and in no acute distress.  Pulm: Breathing comfortably on room air  CN: Symmetric browlift, smile, tongue protrusion, palate elevation, and trapezius. No dysarthria. Extraocular muscles are all intact.  Motor: Good muscle bulk throughout. 5 out of 5 strength in bilateral upper and lower extremities  Sensation: Sensation grossly intact to light touch in all extremities.  Gait: Intact tandem gait.  Reflexes: 2+ reflexes bilateral biceps, brachioradialis, and patellar tendons. Galvan's reflex negative. Bilateral clonus negative.   Coordination: Very mild dysmetria in right hand worse than left.    Imaging:  Prepontine arachnoid cyst present which is worse on the right side. Has deformed the clivus around it as well with mass effect on the brainstem.    Assessment:  #59 year old male with a subarachnoid cyst that appears to be symptomatic    Plan:  -Refer to Dr. Ahn for assessment on approach and benefit of intervention on this cyst.    Patient seen and discussed with MD Scotty Lora MD  Neurosurgery Resident PGY-1    Again, thank you for allowing me to participate in the care of your patient.      Sincerely,    Surjit Torres MD      "

## 2020-01-15 ENCOUNTER — OFFICE VISIT (OUTPATIENT)
Dept: NEUROSURGERY | Facility: CLINIC | Age: 60
End: 2020-01-15
Payer: COMMERCIAL

## 2020-01-15 ENCOUNTER — OFFICE VISIT (OUTPATIENT)
Dept: AUDIOLOGY | Facility: CLINIC | Age: 60
End: 2020-01-15
Attending: NEUROLOGICAL SURGERY
Payer: COMMERCIAL

## 2020-01-15 VITALS
BODY MASS INDEX: 23.8 KG/M2 | HEART RATE: 89 BPM | SYSTOLIC BLOOD PRESSURE: 122 MMHG | HEIGHT: 71 IN | DIASTOLIC BLOOD PRESSURE: 84 MMHG | WEIGHT: 169.97 LBS

## 2020-01-15 DIAGNOSIS — G93.0 INTRACRANIAL ARACHNOID CYST: Primary | ICD-10-CM

## 2020-01-15 DIAGNOSIS — H90.3 SENSORY HEARING LOSS, BILATERAL: Primary | ICD-10-CM

## 2020-01-15 ASSESSMENT — PAIN SCALES - GENERAL: PAINLEVEL: NO PAIN (0)

## 2020-01-15 ASSESSMENT — MIFFLIN-ST. JEOR: SCORE: 1603.13

## 2020-01-15 NOTE — LETTER
1/15/2020       RE: Tommy Lerner  [address]  [address]  [place] [address]     Dear Colleague,    Thank you for referring your patient, Tommy Lerner, to the [facility] NEUROSURGERY at [facility]. Please see a copy of my visit note below.    [facility]  Department of Neurosurgery  Center of Skull Base and Pituitary Surgery    CC: posterior fossa arachnoid cyst, new patient visit      Dear Brissa Vivas, and James:    We saw Mr. Tommy Lerner for the first time today in the Center of Skull Base and Pituitary Surgery for evaluation of a posterior fossa arachnoid cyst. In summary, he is a 59-year-old right-handed male who was recently evaluated by you for balance disturbance, progressive lower extremity weakness, progressive numbness in his hands and feet, slight difficulty initiating urination, and tongue numbness. 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 His work-up demonstrated a prepontine arachnoid cyst with mass effect on the brainstem. We are seeing him today for evaluation of this arachnoid cyst. Of note, he was evaluated by Dr. Ramirez in 12/26/2019.     Currently, he is doing well and appears pleasant. In hindsight, he believes he developed symptoms 1 or 2 years ago, with loss of smell as his first symptom. He later noticed a decline in his skiing performance, despite significant training. Last spring, he noticed left lower extremity shaking and described a dissociative state. More recently, he has experienced balance disturbance while training over the summer, despite being able to perform those tasks previously. Additionally, he has noticed lower extremity weakness, left worse than right, and numbness in his hands and toes radiating to his shins, again left worse than right. He has tongue numbness, though he cannot say which side is worse. Furthermore, he states he  has intermittent difficulty hearing. He denies dysphagia, facial symptoms, or notable changes in taste. He had neck pain that has now resolved. He will be seeing Dr. Ramirez on 02/04/2020.    MEDICAL HISTORY: No active problems at this time.    FAMILY HISTORY:   1. Father - Hyperlipidemia  2. Maternal Grandmother - Pancreatic cancer  3. 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     PHYSICAL EXAM: On exam, his general appearance is good. Visual fields are full to confrontation. Extraocular movements intact. Face is symmetric. Hearing is intact bilaterally. Tongue and uvula are midline. Negative pronator drift. Fingertip to nose testing is normal. Normal-based gait with good balance and tempo. He can tandem walk. He moves all extremities equally and with good coordination.    REVIEW OF STUDIES: His MRI from November shows a simple, T2 hyperintense cyst in the right CPA and CMA causing mass effect on cranial nerves VI-XII, medulla, and annabelle. There are no septations or internal complexity. The basilar artery is located just medial to the cyst. The vertebral artery is just inferior to the cyst. There is no compression of the fourth ventricle and there is no hydrocephalus. The DWI is bland in this area.    IMPRESSION AND PLAN:   1. Balance disturbance, progressive lower extremity weakness (left worse than right), progressive numbness in hands and shins (left worse than right), tongue numbness  2. Right cerebellopontine/cerebellomedullary angle arachnoid cyst with mass effect on the brainstem and lower cranial nerves as seen on MRI of the brain from 11/2019    - Relationship between arachnoid cyst and Mr. Lerner's symptomatology is unclear. Progressive nature of numbness and weakness is concerning. Dr. Ramirez is doing a very thorough workup. If other potential etiologies are all exhausted, then it might be reasonable to consider treatment of his  arachnoid cyst.   - Place referral for audiogram prior to his visit with Dr. Ramirez to evaluate for hearing deficit due to cranial nerve compression from cyst  - Obtain thin-cut T2 MRI of the brain with and without contrast in 4 months to evaluate for changes in cyst  - Obtain MRI of the cervical and thoracic spine with and without contrast  - Briefly reviewed surgical approach for treatment of arachnoid cyst and its associated risks, namely bleeding, infection, stroke, CSF leak, cranial nerve injury, neurologic brain injury, reaccumulation of the cyst, and need for reoperation.     I appreciate your confidence in involving us in your patient's care. Please do not hesitate to contact us with questions. We will keep you informed of his progress.     ANETA AHN MD    I, [facility] Manjarrez, am serving as a scribe to document services personally performed by Aneta Ahn MD, PhD, based upon my observations and the provider's statements to me. All documentation has been reviewed by the aforementioned doctor prior to being entered into the official medical record.    Again, thank you for allowing me to participate in the care of your patient.      Sincerely,    Aneta Ahn MD

## 2020-01-15 NOTE — PROGRESS NOTES
[facility]  Department of Neurosurgery  Center of Skull Base and Pituitary Surgery    CC: posterior fossa arachnoid cyst, new patient visit      Dear Brissa Vivas, and aJmes:    We saw Mr. Tommy Lerner for the first time today in the Center of Skull Base and Pituitary Surgery for evaluation of a posterior fossa arachnoid cyst. In summary, he is a 59-year-old right-handed male who was recently evaluated by you for balance disturbance, progressive lower extremity weakness, progressive numbness in his hands and feet, slight difficulty initiating urination, and tongue numbness. 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 His work-up demonstrated a prepontine arachnoid cyst with mass effect on the brainstem. We are seeing him today for evaluation of this arachnoid cyst. Of note, he was evaluated by Dr. Ramirez in 12/26/2019.     Currently, he is doing well and appears pleasant. In hindsight, he believes he developed symptoms 1 or 2 years ago, with loss of smell as his first symptom. He later noticed a decline in his skiing performance, despite significant training. Last spring, he noticed left lower extremity shaking and described a dissociative state. More recently, he has experienced balance disturbance while training over the summer, despite being able to perform those tasks previously. Additionally, he has noticed lower extremity weakness, left worse than right, and numbness in his hands and toes radiating to his shins, again left worse than right. He has tongue numbness, though he cannot say which side is worse. Furthermore, he states he has intermittent difficulty hearing. He denies dysphagia, facial symptoms, or notable changes in taste. He had neck pain that has now resolved. He will be seeing Dr. Ramirez on 02/04/2020.    MEDICAL HISTORY: No active problems at this time.    FAMILY HISTORY:   1. Father - Hyperlipidemia  2.  Maternal Grandmother - Pancreatic cancer  3. 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     PHYSICAL EXAM: On exam, his general appearance is good. Visual fields are full to confrontation. Extraocular movements intact. Face is symmetric. Hearing is intact bilaterally. Tongue and uvula are midline. Negative pronator drift. Fingertip to nose testing is normal. Normal-based gait with good balance and tempo. He can tandem walk. He moves all extremities equally and with good coordination.    REVIEW OF STUDIES: His MRI from November shows a simple, T2 hyperintense cyst in the right CPA and CMA causing mass effect on cranial nerves VI-XII, medulla, and annabelle. There are no septations or internal complexity. The basilar artery is located just medial to the cyst. The vertebral artery is just inferior to the cyst. There is no compression of the fourth ventricle and there is no hydrocephalus. The DWI is bland in this area.    IMPRESSION AND PLAN:   1. Balance disturbance, progressive lower extremity weakness (left worse than right), progressive numbness in hands and shins (left worse than right), tongue numbness  2. Right cerebellopontine/cerebellomedullary angle arachnoid cyst with mass effect on the brainstem and lower cranial nerves as seen on MRI of the brain from 11/2019    - Relationship between arachnoid cyst and Mr. Lerner's symptomatology is unclear. Progressive nature of numbness and weakness is concerning. Dr. Ramirez is doing a very thorough workup. If other potential etiologies are all exhausted, then it might be reasonable to consider treatment of his arachnoid cyst.   - Place referral for audiogram prior to his visit with Dr. Ramirez to evaluate for hearing deficit due to cranial nerve compression from cyst  - Obtain thin-cut T2 MRI of the brain with and without contrast in 4 months to evaluate for changes in cyst  - Obtain MRI of the cervical  and thoracic spine with and without contrast  - Briefly reviewed surgical approach for treatment of arachnoid cyst and its associated risks, namely bleeding, infection, stroke, CSF leak, cranial nerve injury, neurologic brain injury, reaccumulation of the cyst, and need for reoperation.     I appreciate your confidence in involving us in your patient's care. Please do not hesitate to contact us with questions. We will keep you informed of his progress.     ANETA AHN MD    I, Sandra Manjarrez, am serving as a scribe to document services personally performed by Aneta Ahn MD, PhD, based upon my observations and the provider's statements to me. All documentation has been reviewed by the aforementioned doctor prior to being entered into the official medical record.

## 2020-01-15 NOTE — PROGRESS NOTES
AUDIOLOGY REPORT    SUBJECTIVE:  Tommy Lerner is a 59 year old male who was seen on 1/15/2020 in Audiology at the [facility] for audiologic evaluation, referred by Tommy Ahn M.D.  The patient has been diagnosed with a prepontine arachnoid cyst.  Dr. Ahn requested the evaluation due to potential cranial nerve compression from the cyst.  The patient reports some difficulty hearing in background noise, but he otherwise notices no difficulties hearing.  He denies tinnitus.  He does not have vertigo/dizziness.  Mr. Lerner does note some imbalance and extremity numbness/weakness, which is being evaluated by his physicians.  The patient has never had ear surgery and denies a history of significant noise exposure.  He denies ear pain, aural fullness and drainage from the ears.     OBJECTIVE:  Abuse Screening:  Do you feel unsafe at home or work/school? No  Do you feel threatened by someone? No  Does anyone try to keep you from having contact with others, or doing things outside of your home? No  Physical signs of abuse present? No     Fall Risk Screen:  1. Have you fallen two or more times in the past year? No  2. Have you fallen and had an injury in the past year? No  Is patient a fall risk? No  Referral initiated: No  Fall Risk Assessment Completed by Audiology    Otoscopic exam indicates ears are clear of cerumen bilaterally.     Pure Tone Thresholds assessed using conventional audiometry with good  reliability from 250-8000 Hz bilaterally using circumaural headphones and rechecked with insert earphones.      RIGHT: Normal hearing through 2000 Hz, sloping to a mild sensorineural hearing loss from 0119-1013 Hz, rising back to normal hearing at 8000 Hz.      LEFT:  Normal/borderline normal hearing except at 6000 Hz, where there is a mild likely sensorineural hearing loss.      Tympanogram:    RIGHT: Shallow    LEFT:   Slightly shallow    Reflexes  (reported by stimulus ear):  RIGHT: Ipsilateral is present at normal levels  RIGHT: Contralateral is present at normal levels  LEFT:   Ipsilateral is present at normal levels  LEFT:   Contralateral is present at normal levels      Speech Reception Threshold:    RIGHT: 15 dB HL    LEFT:   10 dB HL  Word Recognition Score:     RIGHT: 100% at 55 dB HL using NU-6 recorded word list.    LEFT:   100% at 55 dB HL using NU-6 recorded word list.      ASSESSMENT:   Sensorineural hearing loss in high frequencies bilaterally  Slightly shallow tympanograms but this may be normal for the patient, given that his acoustic reflexes are present.      Today s results were discussed with the patient in detail.     PLAN:    1. Patient to follow up with Dr. Ahn regarding today's results.  2. Hearing should be rechecked in 1-2 years, sooner if changes are noted or if re-evaluation is recommended by his physicians.     The patient expressed understanding and agreement with this plan.    Anisha Ruby, CCC-A, [facility]  Licensed Audiologist  [place] #[id number]    Enclosure: audiogram      Cc Tommy Ahn MD

## 2020-01-15 NOTE — PATIENT INSTRUCTIONS
Follow up with Dr Ahn in 4 months MRI Brain with and without contrast, MRI Cervical and Thoracic Spine with and without contrast.    Referral for Audiogram    Call Shakira RN Care Coordinator  for questions/concerns    Thank you for using [employer]

## 2020-02-04 ENCOUNTER — OFFICE VISIT (OUTPATIENT)
Dept: NEUROLOGY | Facility: CLINIC | Age: 60
End: 2020-02-04
Payer: COMMERCIAL

## 2020-02-04 VITALS — HEART RATE: 70 BPM | OXYGEN SATURATION: 95 % | DIASTOLIC BLOOD PRESSURE: 90 MMHG | SYSTOLIC BLOOD PRESSURE: 137 MMHG

## 2020-02-04 DIAGNOSIS — G62.9 PERIPHERAL POLYNEUROPATHY: Primary | ICD-10-CM

## 2020-02-04 DIAGNOSIS — G62.9 PERIPHERAL POLYNEUROPATHY: ICD-10-CM

## 2020-02-04 LAB — VIT B12 SERPL-MCNC: 318 PG/ML (ref 193–986)

## 2020-02-04 ASSESSMENT — ENCOUNTER SYMPTOMS
WEAKNESS: 1
SPEECH CHANGE: 0
PARALYSIS: 0
JOINT SWELLING: 0
ARTHRALGIAS: 0
HEADACHES: 1
SEIZURES: 0
DISTURBANCES IN COORDINATION: 1
MEMORY LOSS: 0
MUSCLE WEAKNESS: 1
TINGLING: 1
DIZZINESS: 1
STIFFNESS: 0
MUSCLE CRAMPS: 0
NECK PAIN: 0
BACK PAIN: 0
PANIC: 0
NUMBNESS: 1
INSOMNIA: 1
NERVOUS/ANXIOUS: 1
DECREASED CONCENTRATION: 0
LOSS OF CONSCIOUSNESS: 0
MYALGIAS: 1
TREMORS: 1
DEPRESSION: 0

## 2020-02-04 ASSESSMENT — PAIN SCALES - GENERAL: PAINLEVEL: NO PAIN (0)

## 2020-02-04 NOTE — PROGRESS NOTES
Answers for HPI/ROS submitted by the patient on 2/4/2020   General Symptoms: No  Skin Symptoms: No  HENT Symptoms: No  EYE SYMPTOMS: No  HEART SYMPTOMS: No  LUNG SYMPTOMS: No  INTESTINAL SYMPTOMS: No  URINARY SYMPTOMS: No  REPRODUCTIVE SYMPTOMS: No  SKELETAL SYMPTOMS: Yes  BLOOD SYMPTOMS: No  NERVOUS SYSTEM SYMPTOMS: Yes  MENTAL HEALTH SYMPTOMS: Yes  Back pain: No  Muscle aches: Yes  Neck pain: No  Swollen joints: No  Joint pain: No  Bone pain: No  Muscle cramps: No  Muscle weakness: Yes  Joint stiffness: No  Bone fracture: No  Trouble with coordination: Yes  Dizziness or trouble with balance: Yes  Fainting or black-out spells: No  Memory loss: No  Headache: Yes  Seizures: No  Speech problems: No  Tingling: Yes  Tremor: Yes  Weakness: Yes  Difficulty walking: No  Paralysis: No  Numbness: Yes  Nervous or Anxious: Yes  Depression: No  Trouble sleeping: Yes  Trouble thinking or concentrating: No  Mood changes: No  Panic attacks: No

## 2020-02-04 NOTE — LETTER
RE: Tommy Lerner  [address]  [address]  [place] [address]     Dear Colleague,    Thank you for referring your patient, Tommy Lerner, to the [facility] NEUROLOGY at [facility]. Please see a copy of my visit note below.    Service Date: 2020      Shae Brumfield MD   [facility]    [address] [facility] [facility] [place] [address]      RE: Tommy Lerner   MRN: [id number]   : 1960              Dear Dr. Brumfield:        Once again we saw Mr. 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  He has reported numbness in his feet and we will focus on that today.  We have been working up this weakness and we did do myasthenia testing and a number of other laboratory studies including muscle enzymes, antinuclear antibody has been ordered, a hemoglobin A1c, and we do not find a specific cause for his weakness.  We saw him again today accompanied by a friend who is very helpful.  He still complained of this weakness and numbness in the toes fairly persistent and we reexamined him.        Blood pressure 130/90.  His exam does seem to show distal loss of pinprick in the toes.  Vibration is reduced in one of the toes on the right but not on the left.  Romberg is negative.  Strength is good.  Reflexes are obtainable.  Plantars are flexor.      In summary, we are working up for possible neuropathy.  He has this arachnoid cyst of the brain which I suspect is more incidental than causative of his symptoms, although he is reporting headaches and dizziness.  He is going through a divorce and I think this is a factor in the overall scheme of things.  We will complete a workup and then we will see that he is worked up by Neurosurgery, Dr. Tommy Ahn, who is a skull based surgery.  I think he is trying to determine if this arachnoid cyst in the prepontine  area is significant.  My impression is that the symptoms are not associated with this lesion and they are unrelated.  However, we will continue to unravel why he has his lower extremity paresthesia.  We ordered a B12, MMA, Lyme's test and others.  We will see him one more time.      Sincerely,      Rahul Ramirez MD      D: 2020   T: 2020   MT: tb      Name:     ANETA MELGAR   MRN:      [id number]        Account:      [id number]   :      1960           Service Date: 2020      Document: [id number]

## 2020-02-04 NOTE — PROGRESS NOTES
Service Date: 2020        Shae Brumfield MD   [facility]    [address] [place] [id number]      RE: Tommy Lerner   MRN: [id number]   : 1960              Dear Dr. Brumfield:        Once again we saw Mr. 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  He has reported numbness in his feet and we will focus on that today.  We have been working up this weakness and we did do myasthenia testing and a number of other laboratory studies including muscle enzymes, antinuclear antibody has been ordered, a hemoglobin A1c, and we do not find a specific cause for his weakness.  We saw him again today accompanied by a friend who is very helpful.  He still complained of this weakness and numbness in the toes fairly persistent and we reexamined him.        Blood pressure 130/90.  His exam does seem to show distal loss of pinprick in the toes.  Vibration is reduced in one of the toes on the right but not on the left.  Romberg is negative.  Strength is good.  Reflexes are obtainable.  Plantars are flexor.      In summary, we are working up for possible neuropathy.  He has this arachnoid cyst of the brain which I suspect is more incidental than causative of his symptoms, although he is reporting headaches and dizziness.  He is going through a divorce and I think this is a factor in the overall scheme of things.  We will complete a workup and then we will see that he is worked up by Neurosurgery, Dr. Tommy Ahn, who is a skull based surgery.  I think he is trying to determine if this arachnoid cyst in the prepontine area is significant.  My impression is that the symptoms are not associated with this lesion and they are unrelated.  However, we will continue to unravel why he has his lower extremity paresthesia.  We ordered a B12, MMA, Lyme's test and others.  We  will see him one more time.      Sincerely,      MD JOHANA Pierson MD             D: 2020   T: 2020   MT: [email or web address]      Name:     ANETA MELGAR   MRN:      [id number]        Account:      [id number]   :      1960           Service Date: 2020      Document: [id number]

## 2020-02-05 LAB
ANA SER QL IF: NEGATIVE
B BURGDOR IGG+IGM SER QL: 0.03 (ref 0–0.89)

## 2020-02-06 LAB — METHYLMALONATE SERPL-SCNC: 0.13 UMOL/L (ref 0–0.4)

## 2020-02-11 ENCOUNTER — OFFICE VISIT (OUTPATIENT)
Dept: NEUROLOGY | Facility: CLINIC | Age: 60
End: 2020-02-11
Attending: PSYCHIATRY & NEUROLOGY
Payer: COMMERCIAL

## 2020-02-11 DIAGNOSIS — G62.9 PERIPHERAL POLYNEUROPATHY: ICD-10-CM

## 2020-02-11 NOTE — LETTER
2/11/2020       RE: Tommy Lerner  [address]  [address]  [place] [address]     Dear Colleague,    Thank you for referring your patient, Tommy Lerner, to the [facility] EMG at [facility]. Please see a copy of my visit note below.        [facility]  Electrodiagnostic Laboratory    Nerve Conduction & EMG Report    Patient:       Tommy Lerner  Patient ID:    [id number]  Gender:        Male  YOB: 1960  Age:           59 Years 6 Months      Referring Physician: Rahul Ramirez MD    History & Examination:    59 year old man with chief complaint of numbness at his feet and imbalance, especially with eyes closed. Query polyneuropathy.    Techniques: Motor and sensory conduction studies were done with surface recording electrodes.     Results:    Bilateral sural antidromic sensory NCSs showed mildly attenuated SNAP amplitudes and normal conduction velocities. The left superficial peroneal and radial antidromic sensory NCSs were normal. The sural to radial SNAP amplitude ratio was 0.13 (normal is >0.21). The left deep peroneal and bilateral tibial motor NCSs were normal. The right deep peroneal motor NCS recorded from EDB showed a very mildly prolonged distal latency and normal CMAP amplitude. Needle EMG was deferred (reason: not needed to answer the referral question).    Interpretation:    Abnormal study. There is electrodiagnostic evidence of a mild, length-dependent, predominantly sensory axonal polyneuropathy.     EMG Physician:    Jovani Sharp MD       Sensory NCS      Nerve / Sites Rec. Site Onset Peak NP Amp Ref. PP Amp Dist Barrington Ref. Temp     ms ms  V  V  V cm m/s m/s  C   L RADIAL - Snuff      Forearm Snuff 1.41 1.98 37.3 15.0 96.0 10 71.1 48.0 31.3   L SURAL - Lat Mall 60      Calf Ankle 3.54 4.32 4.8 5.0 7.7 14 39.5 38.0 31.5   R SURAL - Lat Mall 60      Calf Ankle 3.28 4.32 4.6 5.0 6.2 14 42.7 38.0 31.5   L SUP PERONEAL      Lat  Leg [place] 3.02 3.70 6.8  6.4 12.5 41.4 38.0 31.5       Motor NCS      Nerve / Sites Rec. Site Lat Ref. Amp Ref. Rel Amp Dist Barrington Ref. Dur. Area Temp.     ms ms mV mV % cm m/s m/s ms %  C   L DEEP PERONEAL - EDB 60      Ankle EDB 5.10 6.00 5.9 2.0 100 8   7.14 100 31.3      FibHead EDB 12.86  5.8  97.2 30 38.7 38.0 7.76 93.3 31.4      Pop Fos EDB 15.26  5.7  96.5 10.5 43.8 38.0 7.40 93.9 31.4   R DEEP PERONEAL - EDB 60      Ankle EDB 6.25 6.00 3.3 2.0 100 8   6.61 100 31.4   L TIBIAL - AH      Ankle AH 3.54 6.00 11.9 4.0 100 8   7.55 100 31.4      Pop Fos AH 12.24  9.3  77.8 40 46.0 38.0 9.11 86.7 31.3   R TIBIAL - AH      Ankle AH 3.44 6.00 10.7 4.0 100 8   6.72 100 31.4                          Again, thank you for allowing me to participate in the care of your patient.      Sincerely,    Jovani Sharp MD

## 2020-02-11 NOTE — PROGRESS NOTES
[facility]  Electrodiagnostic Laboratory    Nerve Conduction & EMG Report          Patient:       Tommy Lerner  Patient ID:    [id number]  Gender:        Male  YOB: 1960  Age:           59 Years 6 Months      Referring Physician: Rahul Ramirez MD    History & Examination:    59 year old man with chief complaint of numbness at his feet and imbalance, especially with eyes closed. Query polyneuropathy.    Techniques: Motor and sensory conduction studies were done with surface recording electrodes.     Results:    Bilateral sural antidromic sensory NCSs showed mildly attenuated SNAP amplitudes and normal conduction velocities. The left superficial peroneal and radial antidromic sensory NCSs were normal. The sural to radial SNAP amplitude ratio was 0.13 (normal is >0.21). The left deep peroneal and bilateral tibial motor NCSs were normal. The right deep peroneal motor NCS recorded from EDB showed a very mildly prolonged distal latency and normal CMAP amplitude. Needle EMG was deferred (reason: not needed to answer the referral question).    Interpretation:    Abnormal study. There is electrodiagnostic evidence of a mild, length-dependent, predominantly sensory axonal polyneuropathy.     EMG Physician:    Jovani Sharp MD       Sensory NCS      Nerve / Sites Rec. Site Onset Peak NP Amp Ref. PP Amp Dist Barrington Ref. Temp     ms ms  V  V  V cm m/s m/s  C   L RADIAL - Snuff      Forearm Snuff 1.41 1.98 37.3 15.0 96.0 10 71.1 48.0 31.3   L SURAL - Lat Mall 60      Calf Ankle 3.54 4.32 4.8 5.0 7.7 14 39.5 38.0 31.5   R SURAL - Lat Mall 60      Calf Ankle 3.28 4.32 4.6 5.0 6.2 14 42.7 38.0 31.5   L SUP PERONEAL      Lat Leg [place] 3.02 3.70 6.8  6.4 12.5 41.4 38.0 31.5       Motor NCS      Nerve / Sites Rec. Site Lat Ref. Amp Ref. Rel Amp Dist Barrington Ref. Dur. Area Temp.     ms ms mV mV % cm m/s m/s ms %  C   L DEEP PERONEAL - EDB 60      Ankle EDB 5.10 6.00 5.9 2.0 100 8   7.14 100 31.3       FibHead EDB 12.86  5.8  97.2 30 38.7 38.0 7.76 93.3 31.4      Pop Fos EDB 15.26  5.7  96.5 10.5 43.8 38.0 7.40 93.9 31.4   R DEEP PERONEAL - EDB 60      Ankle EDB 6.25 6.00 3.3 2.0 100 8   6.61 100 31.4   L TIBIAL - AH      Ankle AH 3.54 6.00 11.9 4.0 100 8   7.55 100 31.4      Pop Fos AH 12.24  9.3  77.8 40 46.0 38.0 9.11 86.7 31.3   R TIBIAL - AH      Ankle AH 3.44 6.00 10.7 4.0 100 8   6.72 100 31.4

## 2020-02-14 ENCOUNTER — MYC MEDICAL ADVICE (OUTPATIENT)
Dept: NEUROLOGY | Facility: CLINIC | Age: 60
End: 2020-02-14

## 2020-02-18 DIAGNOSIS — G47.00 INSOMNIA, UNSPECIFIED TYPE: Primary | ICD-10-CM

## 2020-02-18 RX ORDER — TRAZODONE HYDROCHLORIDE 50 MG/1
50 TABLET, FILM COATED ORAL AT BEDTIME
Qty: 30 TABLET | Refills: 3 | Status: SHIPPED | OUTPATIENT
Start: 2020-02-18 | End: 2020-06-15

## 2020-02-23 ENCOUNTER — HEALTH MAINTENANCE LETTER (OUTPATIENT)
Age: 60
End: 2020-02-23

## 2020-03-19 ENCOUNTER — TELEPHONE (OUTPATIENT)
Dept: NEUROLOGY | Facility: CLINIC | Age: 60
End: 2020-03-19

## 2020-05-04 ENCOUNTER — VIRTUAL VISIT (OUTPATIENT)
Dept: NEUROLOGY | Facility: CLINIC | Age: 60
End: 2020-05-04
Payer: COMMERCIAL

## 2020-05-04 DIAGNOSIS — G62.9 PERIPHERAL POLYNEUROPATHY: Primary | ICD-10-CM

## 2020-05-04 ASSESSMENT — PAIN SCALES - GENERAL: PAINLEVEL: NO PAIN (0)

## 2020-05-04 NOTE — PROGRESS NOTES
"Tommy Lerner is a 59 year old male who is being evaluated via a billable video visit.      The patient has been notified of following:     \"This video visit will be conducted via a call between you and your physician/provider. We have found that certain health care needs can be provided without the need for an in-person physical exam.  This service lets us provide the care you need with a video conversation.  If a prescription is necessary we can send it directly to your pharmacy.  If lab work is needed we can place an order for that and you can then stop by our lab to have the test done at a later time.    Video visits are billed at different rates depending on your insurance coverage.  Please reach out to your insurance provider with any questions.    If during the course of the call the physician/provider feels a video visit is not appropriate, you will not be charged for this service.\"    Patient has given verbal consent for Video visit? YES     How would you like to obtain your AVS? EMMANUEL     Patient would like the video invitation sent by: EMAIL [email or web address] [email or web address]    Will anyone else be joining your video visit?  NO          Video-Visit Details    Type of service:  telephone 35 min Visit      Distant Location (provider location):  [facility] NEUROLOGY   [facility]    "

## 2020-05-05 NOTE — PROGRESS NOTES
Service Date: 2020      Shae Brumfield MD   [facility]   [address] [place] [address]  [id number]      RE: Tommy Lerner   MRN: [id number]   : 1960      Dear Dr. Brumfield:      We had a telephone visit lasting for 25 minutes with Mr. Tommy Lerner, a 59-year-old male who has been evaluated at the [facility] for paresthesias in his legs and especially in the left distally and also for balance disturbance.  This is mild and he is reporting some loss of power to do his very active exercise program.  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  He has had a workup for a possible neuropathy and an EMG examination did show a mild sensory neuropathy but did not involve significant upper extremity measurements.  In the telephone visit today, he reports that his paresthesias in his left leg and left arm continue to be intermittent.  He does not have any focal weakness.  When he bikes, he does get paresthesias in the left hand.  We need to check him out for carpal tunnel or other such things.  We ran a whole battery of studies for neuromuscular disease problems and for also sensory neuropathy with negative workup.  One confounding factor is that he does have a large arachnoid cyst with mass effect on the brainstem and lower cranial nerves.  He was seen by Neurosurgery, Dr. Tommy Ahn, who was uncertain that this was related to his symptoms but suggested thin cuts T2 MRI of the brain with and without contrast in 4 months to evaluate for changes in the cyst and MRI of the cervical and thoracic spine with and without contrast.  He was to be scheduled for these studies when the 4 months was due and this was around May where he has a return visit, but because of the coronavirus epidemic, it has not been possible to do the followup MRI.      In the meantime, because there seems to be progression of his paresthesias, especially in the left foot and also involving the left hand, we will repeat the  EMG with a comparison study and see whether there have been any changes and will consider epidermal  nerve fiber density studies.  This was communicated with him.  He is okay with that.  He just did 53 miles on his bike.  Stress from his divorce he is easing up.      Sincerely,      MD JOHANA Pierson MD             D: 2020   T: 2020   MT: duncan      Name:     ANETA MELGAR   MRN:      [id number]        Account:      [id number]   :      1960           Service Date: 2020      Document: [id number]

## 2020-05-06 ENCOUNTER — VIRTUAL VISIT (OUTPATIENT)
Dept: NEUROSURGERY | Facility: CLINIC | Age: 60
End: 2020-05-06
Payer: COMMERCIAL

## 2020-05-06 DIAGNOSIS — G93.0 ARACHNOID CYST: Primary | ICD-10-CM

## 2020-05-06 NOTE — PROGRESS NOTES
[facility]  Department of Neurosurgery  Center for Skull Base and Pituitary Surgery      Tommy Lerner   59 year old male who is being evaluated via a video visit      CC:  Posterior fossa arachnoid cyst, followup visit    Participants: patient    HPI:  Jaime Lerner is a 59yoRHM who p/w balance disturbance, progressive lower extremity weakness, numbness in hands/feet, tongue numbness, and difficulty with urinartion. He noticed this especially since he is a competitive cross country skier and found that his imbalance and extremity symptoms were impacting his training and performance. His workup included an MRI showing a posterior fossa arachnoid cyst. I first saw him on 1/15/2020 where we elected to watch his symptoms given the unclear relationship with the mass effect from this cyst.     Since we last met, he reports that his symptoms have progressed which is concerning to him. His numbness is traveling up from his legs to his arms/hands.  In particular, recently he had to shake his left hand out while biking. His tongue and left leg is numb. Balance has been effected. Compared to before, his numbness is more consistently present. No swallowing issues or neck pain. However, he rode 52 miles on his bike last week.    I have reviewed and updated the patient's Past Medical History,  Allergies, Social History, Family History and Medication List.    Imaging:  We again reviewed his MRI from November 2019, demonstrating a simple arachnoid cyst with T2 bright contents in the right CPA/CMA with mass effect on the lower nerves 7-12, annabelle, and medulla. There are no septations or internal complexity. The basilar artery is located just medial to the cyst. The vertebral artery is just inferior to the cyst. There is no compression of the fourth ventricle and there is no hydrocephalus. The DWI is bland in this area.     Audiogram:  PTA: R 17dB, L 16dB  WRS: R 100% @ 55dB, L 100% @ 55dB  Mild sensineural hearing  "loss bilaterally    Assessment:  1. Right posterior fossa arachnoid cyst with mass effect on lower cranial nerves/brainstem  2. Imbalance, leg weakness/numbness (left more than right), tongue numbness    Plan:  1. Discussed the unclear relationship between the arachnoid cyst and his symptoms. My impression is that this cyst is not likely to be causing his symptoms. While the cyst does have mass effect on the brainstem and cranial nerves, his cranial nerve exam is normal and his audiogram is largely normal -- and the cochear nerve tends to be a sensitive nerve for mass effect.   If all other possibilities are exhausted, we can readdress the role of surgery for his posterior fossa cyst.  A cyst fenestration could be performed via a small retromastoid craniectomy.  2. He follows with Dr. Ramirez who has performed a comprehensive and thoughtful neurologic assessment.  3. In the absence of clear association, I would recommend an MRI brain and C spine with thin cut T2 sequences when the Covid19 situation settles, ideally in the next 1-2 months, to compare to his prior MRI in November.  4. Discussed warning signs for which he will contact us in advance of his next appointment    Visit:  Video started at 8:48am  Video ended at 9:07am      Tommy Ahn MD      Patient statement: cannot visit in person due to Covid19    Physician has received verbal consent for a Video Visit from the patient? Yes    Patient would like the video invitation sent by: [email or web address]    Originating Location (pt. Location): Home    Distant Location (provider location):  [facility] NEUROSURGERY     Mode of Communication:  Video Conference via [employer]      Tommy EUGENIO Lerner is a 59 year old male who is being evaluated via a billable video visit.      The patient has been notified of following:     \"This video visit will be conducted via a call between you and your physician/provider. We have found that certain health care needs can be " "provided without the need for an in-person physical exam.  This service lets us provide the care you need with a video conversation.  If a prescription is necessary we can send it directly to your pharmacy.  If lab work is needed we can place an order for that and you can then stop by our lab to have the test done at a later time.    If during the course of the call the physician/provider feels a video visit is not appropriate, you will not be charged for this service.\"       "

## 2020-05-06 NOTE — PATIENT INSTRUCTIONS
Follow up with Dr Ahn in one month with MRI Brain with and without contrast  and MRI Cervical Spine with and without contrast.      Call Shakira RN for questions/concerns     Thank you for using [facility]

## 2020-05-06 NOTE — PROGRESS NOTES
"Tommy Lerner is a 59 year old male who is being evaluated via a billable video visit.      The patient has been notified of following:     \"This video visit will be conducted via a call between you and your physician/provider. We have found that certain health care needs can be provided without the need for an in-person physical exam.  This service lets us provide the care you need with a video conversation.  If a prescription is necessary we can send it directly to your pharmacy.  If lab work is needed we can place an order for that and you can then stop by our lab to have the test done at a later time.    Video visits are billed at different rates depending on your insurance coverage.  Please reach out to your insurance provider with any questions.    If during the course of the call the physician/provider feels a video visit is not appropriate, you will not be charged for this service.\"    Patient has given verbal consent for Video visit? YES     How would you like to obtain your AVS? [facility]     Patient would like the video invitation sent by: Email [email or web address]    Will anyone else be joining your video visit? No        Video-Visit Details    Type of service:  Video Visit    Video Start Time:   Video End Time:     Originating Location (pt. Location): Home    Distant Location (provider location):  [facility] NEUROSURGERY     Platform used for Video Visit: [employer] [place] [employer]          "

## 2020-05-08 ENCOUNTER — TELEPHONE (OUTPATIENT)
Dept: NEUROSURGERY | Facility: CLINIC | Age: 60
End: 2020-05-08

## 2020-05-08 NOTE — TELEPHONE ENCOUNTER
[facility] Call Center    Phone Message    May a detailed message be left on voicemail: yes     Reason for Call: Patient returning clinic's call to schedule follow-up with Dr. Ahn. Patient also believes he is due for an MRI. Please contact patient on mobile phone any time today. Caller states it okay to pick a future date for him and leave a detailed VM if he does not answer as he is working today.   Please advise. Thank you, Ivelisse Leonard on 5/8/2020 at 11:23 AM       Action Taken: Message routed to:  [facility] NE[facility]OSURGERY    Travel Screening: Negative

## 2020-05-08 NOTE — TELEPHONE ENCOUNTER
Per Dr Ahn Plan     3. In the absence of clear association, I would recommend an MRI brain and C spine with thin cut T2 sequences when the Covid19 situation settles, ideally in the next 1-2 months, to compare to his prior MRI in November.    Orders are In, Note sent to scheduling for Imaging and one month F/U with Jimy.

## 2020-05-19 ENCOUNTER — MYC MEDICAL ADVICE (OUTPATIENT)
Dept: FAMILY MEDICINE | Facility: CLINIC | Age: 60
End: 2020-05-19

## 2020-05-19 DIAGNOSIS — Z01.84 IMMUNITY STATUS TESTING: Primary | ICD-10-CM

## 2020-05-19 NOTE — TELEPHONE ENCOUNTER
Requesting information in order to place a COVID antibody test. Once that information is returned we can place the order. Need that information in order to place the test.     Pilar Chaudhari RN  05/19/20  5:05 PM

## 2020-05-22 DIAGNOSIS — Z01.84 IMMUNITY STATUS TESTING: ICD-10-CM

## 2020-05-22 PROCEDURE — 36415 COLL VENOUS BLD VENIPUNCTURE: CPT | Performed by: INTERNAL MEDICINE

## 2020-05-22 PROCEDURE — 99000 SPECIMEN HANDLING OFFICE-LAB: CPT | Performed by: INTERNAL MEDICINE

## 2020-05-22 PROCEDURE — 86769 SARS-COV-2 COVID-19 ANTIBODY: CPT | Mod: 90 | Performed by: INTERNAL MEDICINE

## 2020-05-23 LAB
COVID-19 SPIKE RBD ABY TITER: NORMAL
COVID-19 SPIKE RBD ABY: NEGATIVE

## 2020-06-04 ENCOUNTER — HOSPITAL ENCOUNTER (OUTPATIENT)
Dept: MRI IMAGING | Facility: CLINIC | Age: 60
End: 2020-06-04
Attending: NEUROLOGICAL SURGERY
Payer: COMMERCIAL

## 2020-06-04 DIAGNOSIS — G93.0 INTRACRANIAL ARACHNOID CYST: ICD-10-CM

## 2020-06-04 PROCEDURE — 72156 MRI NECK SPINE W/O & W/DYE: CPT

## 2020-06-04 PROCEDURE — 25500064 ZZH RX 255 OP 636: Performed by: NEUROLOGICAL SURGERY

## 2020-06-04 PROCEDURE — A9585 GADOBUTROL INJECTION: HCPCS | Performed by: NEUROLOGICAL SURGERY

## 2020-06-04 PROCEDURE — 72157 MRI CHEST SPINE W/O & W/DYE: CPT

## 2020-06-04 PROCEDURE — 70553 MRI BRAIN STEM W/O & W/DYE: CPT

## 2020-06-04 RX ORDER — GADOBUTROL 604.72 MG/ML
7.5 INJECTION INTRAVENOUS ONCE
Status: COMPLETED | OUTPATIENT
Start: 2020-06-04 | End: 2020-06-04

## 2020-06-04 RX ADMIN — GADOBUTROL 7.5 ML: 604.72 INJECTION INTRAVENOUS at 11:36

## 2020-06-09 NOTE — PROGRESS NOTES
[facility]  Department of Neurosurgery  Center for Skull Base and Pituitary Surgery      Tommy Lerner   59 year old male who is being evaluated via a video visit      CC:  Posterior fossa arachnoid cyst, followup visit    Participants: patient    HPI:  59yoRHM who p/w balance disturbance, progressive lower extremity weakness, numbness in hands/feet, tongue numbness, and difficulty with urination noticed as he is a competitive cross country skier. His workup included an MRI showing a posterior fossa arachnoid cyst. I first saw him on 1/15/2020 where we elected to watch his symptoms given the unclear relationship with the mass effect from this cyst. His symptoms progressed when I saw him last on 5/6/2020 but his functional status remained high. He returns with a repeat MRI-brain and C/T spine imaging. He also had an audiogram that did not show significant hearing loss attributable to the cyst. An EMG was reassuring.    Since our meeting one month ago, he is concerned about progression of his weakness and numbness. This is primarily in both legs left more than right. He also has numbness in the hands and tongue.  The weakness has progressed over the past four months, and now the right side is as bad as the left side was four months ago. Still biking avidly. His upper body strength is still largely unaffected. Numbness follows the same pattern. Numbness is more pronounced and noticeable than the weakness. Caught himself from falling twice.    Tongue numbness along the tip and moving toward the base. Has also noticed tremor in both hands left more than right. Affects his writing. He is having stress going thru a divorce currently.    I have reviewed and updated the patient's Past Medical History,  Allergies, Social History, Family History and Medication List.    Exam by video:  EOMI, no diplopia  Face symmetric HB1  Tongue midline  No drift    Imaging:  We again reviewed his recent MRI-brain and  compared this to his MRI from November 2019, whic demonstrate a similar appearingsimple arachnoid cyst with T2 bright contents in the right CPA/CMA with mass effect on the lower nerves 7-12, annabelle, and medulla. There are no septations or internal complexity. The basilar artery is located just medial to the cyst. The vertebral artery is just inferior to the cyst. There is no compression of the fourth ventricle and there is no hydrocephalus. The DWI is bland in this area.     His MRI-C/T spine were reviewed that do not show significant stenoses nor syrinx.    Assessment:  1. Right posterior fossa arachnoid cyst with mass effect on lower cranial nerves/brainstem  2. Imbalance, leg weakness/numbness (left more than right), tongue numbness  3. Reassuring audiogram, EMG  4. No C/T spine stenoses    Plan:  1. Discussed the unclear relationship between the arachnoid cyst and his symptoms. My impression is that this cyst is not likely to be causing his symptoms. He would like to continue to monitor his symptoms for now. While the cyst does have mass effect on the brainstem and cranial nerves, his cranial nerve exam is normal and his audiogram is largely normal -- and the cochear nerve tends to be a sensitive nerve for mass effect.   If all other possibilities are exhausted, we can readdress the role of surgery for his posterior fossa cyst.  A cyst fenestration could be performed via a small retromastoid craniectomy.    2. We will see him back in one year with a repeat MRI and audiogram.  3. He follows with Dr. Ramirez who has performed a comprehensive and thoughtful neurologic assessment including EMG.  4. Discussed warning signs for which he will contact us in advance of his next appointment    Visit:  Video started at 8:46am  Video ended at 9:12am      Tommy Ahn MD      Patient statement: cannot visit in person due to Covid19    Physician has received verbal consent for a Video Visit from the patient? Yes    Patient  "would like the video invitation sent by: [email or web address]    Originating Location (pt. Location): Home    Distant Location (provider location):  [facility] NEUROSURGERY     Mode of Communication:  Video Conference via [employer]      Tommy Lerner is a 59 year old male who is being evaluated via a billable video visit.      The patient has been notified of following:     \"This video visit will be conducted via a call between you and your physician/provider. We have found that certain health care needs can be provided without the need for an in-person physical exam.  This service lets us provide the care you need with a video conversation.  If a prescription is necessary we can send it directly to your pharmacy.  If lab work is needed we can place an order for that and you can then stop by our lab to have the test done at a later time.    If during the course of the call the physician/provider feels a video visit is not appropriate, you will not be charged for this service.\"       "

## 2020-06-10 ENCOUNTER — VIRTUAL VISIT (OUTPATIENT)
Dept: NEUROSURGERY | Facility: CLINIC | Age: 60
End: 2020-06-10
Payer: COMMERCIAL

## 2020-06-10 DIAGNOSIS — G93.0 ARACHNOID CYST: Primary | ICD-10-CM

## 2020-06-10 NOTE — LETTER
6/10/2020     RE: Tommy Lerner  [address]  [address]  [place] [address]     Dear Colleague,    Thank you for referring your patient, Tommy Lerner, to the [facility] NEUROSURGERY at [facility]. Please see a copy of my visit note below.    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 His workup included an MRI showing a posterior fossa arachnoid cyst. I first saw him on 1/15/2020 where we elected to watch his symptoms given the unclear relationship with the mass effect from this cyst. His symptoms progressed when I saw him last on 5/6/2020 but his functional status remained high. He returns with a repeat MRI-brain and C/T spine imaging. He also had an audiogram that did not show significant hearing loss attributable to the cyst. An EMG was reassuring.    Since our meeting one month ago, he is concerned about progression of his weakness and numbness. This is primarily in both legs left more than right. He also has numbness in the hands and tongue.  The weakness has progressed over the past four months, and now the right side is as bad as the left side was four months ago. Still biking avidly. His upper body strength is still largely unaffected. Numbness follows the same pattern. Numbness is more pronounced and noticeable than the weakness. Caught himself from falling twice.    Tongue numbness along the tip and moving toward the base. Has also noticed tremor in both hands left more than right. Affects his writing. He is having stress going thru a divorce  currently.    I have reviewed and updated the patient's Past Medical History,  Allergies, Social History, Family History and Medication List.    Exam by video:  EOMI, no diplopia  Face symmetric HB1  Tongue midline  No drift    Imaging:  We again reviewed his recent MRI-brain and compared this to his MRI from November 2019, whic demonstrate a similar appearingsimple arachnoid cyst with T2 bright contents in the right CPA/CMA with mass effect on the lower nerves 7-12, annabelle, and medulla. There are no septations or internal complexity. The basilar artery is located just medial to the cyst. The vertebral artery is just inferior to the cyst. There is no compression of the fourth ventricle and there is no hydrocephalus. The DWI is bland in this area.     His MRI-C/T spine were reviewed that do not show significant stenoses nor syrinx.    Assessment:  1. Right posterior fossa arachnoid cyst with mass effect on lower cranial nerves/brainstem  2. Imbalance, leg weakness/numbness (left more than right), tongue numbness  3. Reassuring audiogram, EMG  4. No C/T spine stenoses    Plan:  1. Discussed the unclear relationship between the arachnoid cyst and his symptoms. My impression is that this cyst is not likely to be causing his symptoms. He would like to continue to monitor his symptoms for now. While the cyst does have mass effect on the brainstem and cranial nerves, his cranial nerve exam is normal and his audiogram is largely normal -- and the cochear nerve tends to be a sensitive nerve for mass effect.   If all other possibilities are exhausted, we can readdress the role of surgery for his posterior fossa cyst.  A cyst fenestration could be performed via a small retromastoid craniectomy.    2. We will see him back in one year with a repeat MRI and audiogram.  3. He follows with Dr. Ramirez who has performed a comprehensive and thoughtful neurologic assessment including EMG.  4. Discussed warning signs for which he will  "contact us in advance of his next appointment    Visit:  Video started at 8:46am  Video ended at 9:12am      Tommy Ahn MD      Patient statement: cannot visit in person due to Covid19    Physician has received verbal consent for a Video Visit from the patient? Yes    Patient would like the video invitation sent by: [email or web address]    Originating Location (pt. Location): Home    Distant Location (provider location):  [facility]     Mode of Communication:  Video Conference via [employer]      Tommy Lerner is a 59 year old male who is being evaluated via a billable video visit.      The patient has been notified of following:     \"This video visit will be conducted via a call between you and your physician/provider. We have found that certain health care needs can be provided without the need for an in-person physical exam.  This service lets us provide the care you need with a video conversation.  If a prescription is necessary we can send it directly to your pharmacy.  If lab work is needed we can place an order for that and you can then stop by our lab to have the test done at a later time.    If during the course of the call the physician/provider feels a video visit is not appropriate, you will not be charged for this service.\"         Tommy Lerner is a 59 year old male who is being evaluated via a billable video visit.      The patient has been notified of following:     \"This video visit will be conducted via a call between you and your physician/provider. We have found that certain health care needs can be provided without the need for an in-person physical exam.  This service lets us provide the care you need with a video conversation.  If a prescription is necessary we can send it directly to your pharmacy.  If lab work is needed we can place an order for that and you can then stop by our lab to have the test done at a later time.    Video visits are billed at different " "rates depending on your insurance coverage.  Please reach out to your insurance provider with any questions.    If during the course of the call the physician/provider feels a video visit is not appropriate, you will not be charged for this service.\"    Patient has given verbal consent for Video visit? YES    How would you like to obtain your AVS? Fernando    Patient would like the video invitation sent by: Email [email or web address]    Will anyone else be joining your video visit? no      Video-Visit Details  Type of service:  Video Visit  Video Start Time:   Video End Time:  Originating Location (pt. Location): Home  Distant Location (provider location):  [facility] NEUROSURGERY   Platform used for Video Visit: Emerson Montana EMT    "

## 2020-06-10 NOTE — PATIENT INSTRUCTIONS
Follow up with Dr Ahn in one year with MRI Brain without contrast include thin cut fiesta from C1 up through optic nerves    Audiogram in one year prior to Follow up appointment    Call Shakira RN Care Coordinator  for questions/concerns.    Thank you for using [employer]

## 2020-06-10 NOTE — PROGRESS NOTES
"Tommy Lerner is a 59 year old male who is being evaluated via a billable video visit.      The patient has been notified of following:     \"This video visit will be conducted via a call between you and your physician/provider. We have found that certain health care needs can be provided without the need for an in-person physical exam.  This service lets us provide the care you need with a video conversation.  If a prescription is necessary we can send it directly to your pharmacy.  If lab work is needed we can place an order for that and you can then stop by our lab to have the test done at a later time.    Video visits are billed at different rates depending on your insurance coverage.  Please reach out to your insurance provider with any questions.    If during the course of the call the physician/provider feels a video visit is not appropriate, you will not be charged for this service.\"    Patient has given verbal consent for Video visit? YES    How would you like to obtain your AVS? [facility]    Patient would like the video invitation sent by: Email [email or web address]    Will anyone else be joining your video visit? no        Video-Visit Details    Type of service:  Video Visit    Video Start Time:   Video End Time:    Originating Location (pt. Location): Home    Distant Location (provider location):  [facility] NEUROSURGERY     Platform used for Video Visit: Emerson Montana EMT          "

## 2020-06-15 ENCOUNTER — VIRTUAL VISIT (OUTPATIENT)
Dept: NEUROLOGY | Facility: CLINIC | Age: 60
End: 2020-06-15
Payer: COMMERCIAL

## 2020-06-15 DIAGNOSIS — G47.00 INSOMNIA, UNSPECIFIED TYPE: ICD-10-CM

## 2020-06-15 RX ORDER — TRAZODONE HYDROCHLORIDE 50 MG/1
50 TABLET, FILM COATED ORAL AT BEDTIME
Qty: 30 TABLET | Refills: 4 | Status: SHIPPED | OUTPATIENT
Start: 2020-06-15 | End: 2020-09-14

## 2020-06-15 NOTE — PROGRESS NOTES
"Tommy Lerner is a 59 year old male who is being evaluated via a billable video visit.      The patient has been notified of following:     \"This video visit will be conducted via a call between you and your physician/provider. We have found that certain health care needs can be provided without the need for an in-person physical exam.  This service lets us provide the care you need with a video conversation.  If a prescription is necessary we can send it directly to your pharmacy.  If lab work is needed we can place an order for that and you can then stop by our lab to have the test done at a later time.    Video visits are billed at different rates depending on your insurance coverage.  Please reach out to your insurance provider with any questions.    If during the course of the call the physician/provider feels a video visit is not appropriate, you will not be charged for this service.\"    Patient has given verbal consent for Video visit? YES    Will anyone else be joining your video visit? no        Video-Visit Details    Type of service:  Video Visit    Video Start Time:   Video End Time:     Originating Location (pt. Location): Home    Distant Location (provider location):  [facility] NEUROLOGY     Platform used for Video Visit: Fernando Montana, EMT        "

## 2020-06-15 NOTE — LETTER
"6/15/2020       RE: Tommy Lerner  [address]  [address]  [place] [address]     Dear Colleague,    Thank you for referring your patient, Tommy Lerner, to the [facility] NEUROLOGY at [facility]. Please see a copy of my visit note below.    Tommy Lerner is a 59 year old male who is being evaluated via a billable video visit.      The patient has been notified of following:     \"This video visit will be conducted via a call between you and your physician/provider. We have found that certain health care needs can be provided without the need for an in-person physical exam.  This service lets us provide the care you need with a video conversation.  If a prescription is necessary we can send it directly to your pharmacy.  If lab work is needed we can place an order for that and you can then stop by our lab to have the test done at a later time.    Video visits are billed at different rates depending on your insurance coverage.  Please reach out to your insurance provider with any questions.    If during the course of the call the physician/provider feels a video visit is not appropriate, you will not be charged for this service.\"    Patient has given verbal consent for Video visit? YES    Will anyone else be joining your video visit? no  {If patient encounters technical issues they should call [phone] :[phone]}      Video-Visit Details    Type of service:  Video Visit    Video Start Time:   Video End Time:     Originating Location (pt. Location): Home    Distant Location (provider location):  [facility] NEUROLOGY     Platform used for Video Visit: MENDY Wise          Again, thank you for allowing me to participate in the care of your patient.      Sincerely,    Rahul Ramirez MD      "

## 2020-06-16 DIAGNOSIS — G47.00 INSOMNIA, UNSPECIFIED TYPE: ICD-10-CM

## 2020-06-16 NOTE — PROGRESS NOTES
Service Date: 06/15/2020    TELEPHONE X 25 MIN  Shae Brumfield MD   [facility]   [address] [place] [address]  [id number]      RE: Tommy Lerner   MRN: [id number]   : 1960      Dear Dr. Brumfield:      Once again, we saw Mr. Lerner, a 59-year-old male with a number of complaints and for which neurological evaluation has been in process.  He reports today in a telephone visit that lasted approximately 25 minutes that he is still having numbness in his feet and it actually has climbed up his leg and some in the thigh.  He says he had some weakness of the quads, thighs and muscles and they hurt as well when he goes too fast.  He has given up running and is doing mostly bicycling, although he may do 100 miles a week, and the hands also have some tingling and pain in the legs and, as I say, cannot run anymore.      He reports that his marriage has been dissolved and he still has some legal hurdles to concur, and hopefully, in a couple of weeks, this will be settled.  He has a history of depression a number of years ago and actually found out he had been on Lexapro from  until he says probably  or .  He was started on Wellbutrin with no success.  He can see depression now with his life issues and is significant.      He has not had COVID or other problems of the recent epidemic.  He has been followed by Neurosurgery because of a fairly large prepontine cyst and this is unchanged on a recent MRI and the plan is to observe him.  It seems not to be clinically relevant to his symptoms.  We have examined him in the past.  He has had an EMG that confirms the neuropathy is present and this was done by Dr. Sharp.  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  We have done a full workup of neuropathy including B12, MMA, Lyme disease, antinuclear antibody, comprehensive panel,  hemoglobin A1c, sed rate, CPK acetylcholine receptor antibodies and this had not shown a particular etiology.      We will have to reexamine him and that can be done with the epidemic is over.  In the meantime, we will schedule him for epidermal nerve fiber density study, and we will okay as per his request the trazodone.  Antidepressant Lexapro is an option, but we will wait to see if the resolution of his marital issues will bring about some comfort from these symptoms.  I do not think the symptoms are related to his central nervous system very large arachnoid cyst that is described in the notes by Neurosurgery.  We will touch base with him after the COVID is over and we will see him and reexamine him at that time.      Sincerely,      MD JOHANA Pierson MD             D: 06/15/2020   T: 06/15/2020   MT: duncan      Name:     ANETA MELGAR   MRN:      [id number]        Account:      [id number]   :      1960           Service Date: 06/15/2020      Document: [id number]

## 2020-06-18 RX ORDER — TRAZODONE HYDROCHLORIDE 50 MG/1
TABLET, FILM COATED ORAL
Qty: 90 TABLET | Refills: 1 | OUTPATIENT
Start: 2020-06-18

## 2020-07-23 DIAGNOSIS — N52.9 ERECTILE DYSFUNCTION, UNSPECIFIED ERECTILE DYSFUNCTION TYPE: ICD-10-CM

## 2020-07-23 RX ORDER — SILDENAFIL 100 MG/1
100 TABLET, FILM COATED ORAL DAILY PRN
Qty: 30 TABLET | Refills: 5 | Status: SHIPPED | OUTPATIENT
Start: 2020-07-23 | End: 2020-08-07

## 2020-07-23 NOTE — TELEPHONE ENCOUNTER
Last time prescribed: 07/11/2019 , 30 tabs x 5 refills  Last office visit: 11/08/2019  Next appointment: No future appointments    Prescription approved per [facility] Refill Protocol.  Jenna Dwyer RN  [facility]

## 2020-08-07 ENCOUNTER — OFFICE VISIT (OUTPATIENT)
Dept: FAMILY MEDICINE | Facility: CLINIC | Age: 60
End: 2020-08-07
Payer: COMMERCIAL

## 2020-08-07 VITALS
SYSTOLIC BLOOD PRESSURE: 131 MMHG | OXYGEN SATURATION: 98 % | WEIGHT: 167 LBS | HEART RATE: 68 BPM | DIASTOLIC BLOOD PRESSURE: 81 MMHG | RESPIRATION RATE: 14 BRPM | HEIGHT: 71 IN | BODY MASS INDEX: 23.38 KG/M2 | TEMPERATURE: 97 F

## 2020-08-07 DIAGNOSIS — K21.9 GASTROESOPHAGEAL REFLUX DISEASE, ESOPHAGITIS PRESENCE NOT SPECIFIED: Primary | ICD-10-CM

## 2020-08-07 DIAGNOSIS — N52.9 ERECTILE DYSFUNCTION, UNSPECIFIED ERECTILE DYSFUNCTION TYPE: ICD-10-CM

## 2020-08-07 DIAGNOSIS — R05.3 CHRONIC COUGH: ICD-10-CM

## 2020-08-07 DIAGNOSIS — R49.9 HOARSENESS OR CHANGING VOICE: ICD-10-CM

## 2020-08-07 RX ORDER — SILDENAFIL 100 MG/1
100 TABLET, FILM COATED ORAL DAILY PRN
Qty: 30 TABLET | Refills: 5 | Status: SHIPPED | OUTPATIENT
Start: 2020-08-07 | End: 2022-10-21

## 2020-08-07 RX ORDER — PANTOPRAZOLE SODIUM 20 MG/1
40 TABLET, DELAYED RELEASE ORAL DAILY
Qty: 60 TABLET | Refills: 0 | Status: SHIPPED | OUTPATIENT
Start: 2020-08-07 | End: 2020-09-01

## 2020-08-07 ASSESSMENT — MIFFLIN-ST. JEOR: SCORE: 1584.38

## 2020-08-07 NOTE — PROGRESS NOTES
SUBJECTIVE:   Tommy Lerner is a 60 year old male who presents to clinic today to discuss the following problem(s).    Persistent cough has been going on for years  - recently he notes it has affected his voice so he felt he should get it checked out  - patient reports he highly athletic, initially he states there is no way this cough is due to asthma, later he does acknowledge his cough can be worse if he over exerts himself and does remember being told he may have exercise induced asthma  - does think that acid reflux could be contributing to his symptoms as he notes increased symptoms if he drinks too much coffee or eats pizza  - does report he thinks he has pretty consistent post nasal drip; he wears a CPAP machine at night that really helps with diagnosed WALDEMAR and in the morning he can really feel drainage going down the back of his throat  - further ROS as noted below    ED  - patient has been prescribed viagra in the past and has had good success with this medication and no report of concerning side effects  - med list reviewed, no other medications listed concerning for possible hypotensive complications  - patient reports the prescription at his current pharmacy is too expensive, he is requesting a paper prescription today to try and set up a less expensive mail delivery pharmacy    ROS:   CONSTITUTIONAL: NEGATIVE for chills, fatigue, fever, sweats and weight loss  ENT/MOUTH: Postnasal drainage as noted above  RESP: Cough as detailed above  CV: NEGATIVE for chest pain/chest pressure, dyspnea on exertion  GI: NEGATIVE for abdominal pain, diarrhea, dysphagia and nausea  MUSCULOSKELETAL: NEGATIVE for edema  PSYCHIATRIC: NEGATIVE    Today's PHQ-2:  PHQ-2 ( 1999 Pfizer) 1/7/2020 11/8/2019   Q1: Little interest or pleasure in doing things 0 0   Q2: Feeling down, depressed or hopeless 0 0   PHQ-2 Score 0 0       Past Medical History:   Diagnosis Date     Chronic cough      Past Surgical History:   Procedure  "Laterality Date     vasectomy       Family History   Problem Relation Age of Onset     Pancreatic Cancer Maternal Grandmother      Hyperlipidemia Father      Other - See Comments Father         idiopathic pulmonary fibrosis     Other - See Comments Other         Etoh dependence, dad and brother     Diabetes Other         m unc, type I     Other - See Comments Mother 80        memory loss     Sleep Apnea Mother      Social History     Tobacco Use     Smoking status: Never Smoker     Smokeless tobacco: Former User     Types: Chew   Substance Use Topics     Alcohol use: Yes     Alcohol/week: 0.0 standard drinks     Comment: mostly on weekends, 1-2 drinks     Drug use: No     Social History     Social History Narrative    ** Merged History Encounter **            Current Outpatient Medications   Medication     albuterol (VENTOLIN HFA) 108 (90 Base) MCG/ACT inhaler     Cholecalciferol (VITAMIN D3) 50 MCG (2000 UT) CAPS     MAGNESIUM PO     Omega-3 Fatty Acids (FISH OIL PO)     sildenafil (VIAGRA) 100 MG tablet     traZODone (DESYREL) 50 MG tablet     No current facility-administered medications for this visit.      I have reviewed the patient's past medical, surgical, family, and social history.     OBJECTIVE:   /81   Pulse 68   Temp 97  F (36.1  C)   Resp 14   Ht 1.803 m (5' 10.98\")   Wt 75.8 kg (167 lb)   SpO2 98%   BMI 23.30 kg/m      Constitutional: well-appearing, appears stated age  Eyes: conjunctivae without erythema, sclera anicteric.   ENT: no audible congestion, minimal posterior drainage or erythema, minimal non-tender submandibular lymphadenopathy  Cardiac: regular rate and rhythm, normal S1/S2, no murmur/rubs/gallops  Respiratory: lungs clear to auscultation bilaterally, normal work of breathing, no wheezes/crackles  Skin: no rashes, lesions, or wounds  Psych: affect is full and appropriate, speech is fluent and non-pressured    ASSESSMENT AND PLAN:     Tommy was seen today for " cough.    Diagnoses and all orders for this visit:    Gastroesophageal reflux disease, esophagitis presence not specified  -     pantoprazole (PROTONIX) 20 MG EC tablet; Take 2 tablets (40 mg) by mouth daily    Erectile dysfunction, unspecified erectile dysfunction type  -     sildenafil (VIAGRA) 100 MG tablet; Take 1 tablet (100 mg) by mouth daily as needed (ED) Take 30min- 4 hrs before intercourse.No use with nitroglycerin, terazosin or doxazosin.    Chronic cough    Hoarseness or changing voice    Multiple factors could be contributing to patient's cough at this point including PND, GERD, asthma. Patient suspects these are all contributing. I have advised addressing each possibility sequentially to hopefully get a sense of which issues are playing a larger role in symptoms and can be most effectively treated. Will start with a trial of PPI, could next consider adding nasal steroid to address possible PND, and could consider adding a low dose ICS beyond that. I do have some concern for the vocal changes and would consider a referral to ENT if voice fails to improve with management of cough.    Sildenafil appears to be working effectively and safely. Agreed to print prescription for sildenafil as noted above.      Quentin Ash MD  [facility]  08/07/2020, 9:26 AM

## 2020-08-07 NOTE — NURSING NOTE
"60 year old  Chief Complaint   Patient presents with     Cough     ongoing for years pt reports it is starting to effect his voice       Blood pressure 131/81, pulse 68, temperature 97  F (36.1  C), resp. rate 14, height 1.803 m (5' 10.98\"), weight 75.8 kg (167 lb), SpO2 98 %. Body mass index is 23.3 kg/m .  Patient Active Problem List   Diagnosis     WALDEMAR (obstructive sleep apnea)     Anosmia       Wt Readings from Last 2 Encounters:   08/07/20 75.8 kg (167 lb)   01/15/20 77.1 kg (169 lb 15.6 oz)     BP Readings from Last 3 Encounters:   08/07/20 131/81   02/04/20 (!) 137/90   01/15/20 122/84         Current Outpatient Medications   Medication     albuterol (VENTOLIN HFA) 108 (90 Base) MCG/ACT inhaler     Cholecalciferol (VITAMIN D3) 50 MCG (2000 UT) CAPS     MAGNESIUM PO     Omega-3 Fatty Acids (FISH OIL PO)     sildenafil (VIAGRA) 100 MG tablet     traZODone (DESYREL) 50 MG tablet     No current facility-administered medications for this visit.        Social History     Tobacco Use     Smoking status: Never Smoker     Smokeless tobacco: Former User     Types: Chew   Substance Use Topics     Alcohol use: Yes     Alcohol/week: 0.0 standard drinks     Comment: mostly on weekends, 1-2 drinks     Drug use: No       Health Maintenance Due   Topic Date Due     ADVANCE CARE PLANNING  1960     COLORECTAL CANCER SCREENING  08/07/1969     PREVENTIVE CARE VISIT  06/24/2016       No results found for: PAP      August 7, 2020 9:21 AM  "

## 2020-08-25 ENCOUNTER — OFFICE VISIT (OUTPATIENT)
Dept: FAMILY MEDICINE | Facility: CLINIC | Age: 60
End: 2020-08-25
Payer: COMMERCIAL

## 2020-08-25 VITALS
HEART RATE: 65 BPM | BODY MASS INDEX: 23.52 KG/M2 | WEIGHT: 168 LBS | OXYGEN SATURATION: 93 % | SYSTOLIC BLOOD PRESSURE: 127 MMHG | DIASTOLIC BLOOD PRESSURE: 83 MMHG | TEMPERATURE: 98.6 F | HEIGHT: 71 IN

## 2020-08-25 DIAGNOSIS — R61 NIGHT SWEATS: ICD-10-CM

## 2020-08-25 DIAGNOSIS — R10.12 ABDOMINAL PAIN, LEFT UPPER QUADRANT: Primary | ICD-10-CM

## 2020-08-25 LAB
% GRANULOCYTES: 63.2 %G (ref 40–75)
ERYTHROCYTE [DISTWIDTH] IN BLOOD BY AUTOMATED COUNT: 13.5 %
GRANULOCYTES #: 3.6 K/UL (ref 1.6–8.3)
HCT VFR BLD AUTO: 42.3 % (ref 40–53)
HEMOGLOBIN: 14.2 G/DL (ref 13.3–17.7)
LYMPHOCYTES # BLD AUTO: 1.7 K/UL (ref 0.8–5.3)
LYMPHOCYTES NFR BLD AUTO: 30.6 %L (ref 20–48)
MCH RBC QN AUTO: 30.5 PG (ref 26.5–35)
MCHC RBC AUTO-ENTMCNC: 33.6 G/DL (ref 32–36)
MCV RBC AUTO: 90.8 FL (ref 78–100)
MID #: 0.4 K/UL (ref 0–2.2)
MID %: 6.2 %M (ref 0–20)
PLATELET # BLD AUTO: 227 K/UL (ref 150–450)
RBC # BLD AUTO: 4.66 M/UL (ref 4.4–5.9)
TSH SERPL DL<=0.005 MIU/L-ACNC: 1.41 MU/L (ref 0.4–4)
WBC # BLD AUTO: 5.7 K/UL (ref 4–11)

## 2020-08-25 ASSESSMENT — MIFFLIN-ST. JEOR: SCORE: 1589.17

## 2020-08-25 NOTE — PROGRESS NOTES
SUBJECTIVE:   Tommy Lerner is a 60 year old male who presents to clinic today to discuss the following problem(s).    Mild discomfort under edge of left rib cage for a little while  - for the past month or so  - at first it was absolutely nothing  - but over time it has become more noticeable  - more of an issue when he is upright, less to when he lies down  - considers himself to be very healthy, rides bike for 40+ miles at a go, recently ran a marathon, does not think there is any way this is his heart  - has had a broken rib before, doesn't think this feels like that  - doesn't think it feels like muscle strain  - can feel tenderness if he digs his hand up underneath his ribs on the left side (splenic flexure)     Night sweats  - per patient, he recently had one friend die fairly quickly after diagnosis of pancreatic cancer, another the same way after diagnosis of liver cancer; early symptoms in both cases included night sweats so he is worried  - can't say it happens every night but almost every night  - no significant weight changes recently  - no objective recorded fever  - no increased fatigue  - last night had to move to another part of the bed it was so bad  - does admit a mild change in stool: a little more constipation than when he was young  - patient has been dealing with new onset of peripheral neuropathy, seen by neurology, with extensive recent labs and imaging which have noted an intracranial arachnoid cyst    ROS:   CONSTITUTIONAL: Night sweats as noted above. NEGATIVE for chills, fatigue, fever and weight loss  EYES: NEGATIVE for diplopia, eye pain and photophobia  ENT/MOUTH: NEGATIVE for nasal congestion, postnasal drainage and rhinorrhea  RESP: NEGATIVE for cough, SOB/dyspnea and wheezing  CV: NEGATIVE for chest pain/chest pressure, dyspnea on exertion  GI: Previous epigastric discomfort and vocal hoarseness appear to be improving with recently prescribed PPI. NEGATIVE for abdominal pain,  diarrhea, dysphagia and nausea  MUSCULOSKELETAL: Discomfort at splenic flexure as noted above  INTEGUMENTARY/SKIN: NEGATIVE for new lesions and pruritis  NEURO: Peripheral neuropathy as noted above  ENDOCRINE: Night sweats as noted above  HEME/ALLERGY/IMMUNE: NEGATIVE for swollen nodes  PSYCHIATRIC: NEGATIVE    Today's PHQ-2:  PHQ-2 ( 1999 Pfizer) 8/25/2020 1/7/2020   Q1: Little interest or pleasure in doing things 0 0   Q2: Feeling down, depressed or hopeless 0 0   PHQ-2 Score 0 0       Past Medical History:   Diagnosis Date     Chronic cough      Past Surgical History:   Procedure Laterality Date     vasectomy       Family History   Problem Relation Age of Onset     Pancreatic Cancer Maternal Grandmother      Hyperlipidemia Father      Other - See Comments Father         idiopathic pulmonary fibrosis     Other - See Comments Other         Etoh dependence, dad and brother     Diabetes Other         m unc, type I     Other - See Comments Mother 80        memory loss     Sleep Apnea Mother      Social History     Tobacco Use     Smoking status: Never Smoker     Smokeless tobacco: Former User     Types: Chew   Substance Use Topics     Alcohol use: Yes     Alcohol/week: 0.0 standard drinks     Comment: mostly on weekends, 1-2 drinks     Drug use: No     Social History     Social History Narrative    ** Merged History Encounter **            Current Outpatient Medications   Medication     albuterol (VENTOLIN HFA) 108 (90 Base) MCG/ACT inhaler     Cholecalciferol (VITAMIN D3) 50 MCG (2000 UT) CAPS     MAGNESIUM PO     Omega-3 Fatty Acids (FISH OIL PO)     pantoprazole (PROTONIX) 20 MG EC tablet     sildenafil (VIAGRA) 100 MG tablet     traZODone (DESYREL) 50 MG tablet     No current facility-administered medications for this visit.      I have reviewed the patient's past medical, surgical, family, and social history.     OBJECTIVE:   /83 (BP Location: Left arm, Patient Position: Sitting, Cuff Size: Adult Large)    "Pulse 65   Temp 98.6  F (37  C) (Temporal)   Ht 1.803 m (5' 11\")   Wt 76.2 kg (168 lb)   SpO2 93%   BMI 23.43 kg/m      Constitutional: well-appearing, appears stated age  Eyes: conjunctivae without erythema, sclera anicteric.   Cardiac: regular rate and rhythm, normal S1/S2, no murmur/rubs/gallops  Respiratory: lungs clear to auscultation bilaterally, normal work of breathing, no wheezes/crackles  Abdomen: soft, non-distended, BS+, non-tender to palpation, no masses or HSM to palpation  Skin: no rashes, lesions, or wounds  Psych: affect is full and appropriate, speech is fluent and non-pressured    ASSESSMENT AND PLAN:     Tommy was seen today for gastrointestinal problem.    Diagnoses and all orders for this visit:    Abdominal pain, left upper quadrant  -     CT Abdomen pelvis w contrast*; Future    Night sweats  -     TSH with free T4 reflex  -     CBC with Diff Plt ([facility])    Unclear diagnosis at this point. Consideration includes muscular strain, inflammation of splenic capsule, inflammation at splenic flexure, possible hiatal hernia given history of acid reflux. Less likely renal issue. Initially considered LUQ ultrasound, but given the array of patient's complaints, especially his high concern for possible cancer, I would like to get a broader perspective on his abdominal discomfort. Will notify patient of results as available.     Again, unclear what could be causing patient's night sweats. I am not concerned for possible TB or any other sort of infection at this point. With the noted arachnoid cyst, I considered the possibility of autonomic dysfunction or thermal regulation in the pituitary/hypthalmous. Would defer to neurology or endocrine in this respect. Will check his TSH as well as a CBC for possible blood dyscrasia at this point.     The total time for the visit was 25 minutes. More than 50% of this time was spent counseling the patient        Quentin Ash MD  [facility]  08/25/2020, 2:56 PM

## 2020-08-25 NOTE — NURSING NOTE
"60 year old  Chief Complaint   Patient presents with     Gastrointestinal Problem     LUQ mild discomfort ,  pt wakes up at NOC sweating 3-4 mons        Blood pressure 127/83, pulse 65, temperature 98.6  F (37  C), temperature source Temporal, height 1.803 m (5' 11\"), weight 76.2 kg (168 lb), SpO2 93 %. Body mass index is 23.43 kg/m .  Patient Active Problem List   Diagnosis     WALDEMAR (obstructive sleep apnea)     Anosmia       Wt Readings from Last 2 Encounters:   08/25/20 76.2 kg (168 lb)   08/07/20 75.8 kg (167 lb)     BP Readings from Last 3 Encounters:   08/25/20 127/83   08/07/20 131/81   02/04/20 (!) 137/90         Current Outpatient Medications   Medication     Cholecalciferol (VITAMIN D3) 50 MCG (2000 UT) CAPS     MAGNESIUM PO     Omega-3 Fatty Acids (FISH OIL PO)     pantoprazole (PROTONIX) 20 MG EC tablet     sildenafil (VIAGRA) 100 MG tablet     traZODone (DESYREL) 50 MG tablet     albuterol (VENTOLIN HFA) 108 (90 Base) MCG/ACT inhaler     No current facility-administered medications for this visit.        Social History     Tobacco Use     Smoking status: Never Smoker     Smokeless tobacco: Former User     Types: Chew   Substance Use Topics     Alcohol use: Yes     Alcohol/week: 0.0 standard drinks     Comment: mostly on weekends, 1-2 drinks     Drug use: No       Health Maintenance Due   Topic Date Due     ADVANCE CARE PLANNING  1960     COLORECTAL CANCER SCREENING  08/07/1969     PREVENTIVE CARE VISIT  06/24/2016     INFLUENZA VACCINE (1) 09/01/2020       No results found for: PAP      August 25, 2020 2:59 PM  "

## 2020-08-29 DIAGNOSIS — K21.9 GASTROESOPHAGEAL REFLUX DISEASE, ESOPHAGITIS PRESENCE NOT SPECIFIED: ICD-10-CM

## 2020-08-31 NOTE — TELEPHONE ENCOUNTER
Last time prescribed: 8/7/2020 , 60 tabs x 0 refills  Last office visit: 8/25/2020  Next appointment: No future appointments     Prescription approved per G Refill Protocol.  Jenna Dwyer RN  [facility]

## 2020-09-01 RX ORDER — PANTOPRAZOLE SODIUM 20 MG/1
40 TABLET, DELAYED RELEASE ORAL DAILY
Qty: 60 TABLET | Refills: 0 | Status: SHIPPED | OUTPATIENT
Start: 2020-09-01 | End: 2020-10-01

## 2020-09-09 ENCOUNTER — ANCILLARY PROCEDURE (OUTPATIENT)
Dept: CT IMAGING | Facility: CLINIC | Age: 60
End: 2020-09-09
Attending: FAMILY MEDICINE
Payer: COMMERCIAL

## 2020-09-09 DIAGNOSIS — G47.00 INSOMNIA, UNSPECIFIED TYPE: ICD-10-CM

## 2020-09-09 DIAGNOSIS — R10.12 ABDOMINAL PAIN, LEFT UPPER QUADRANT: ICD-10-CM

## 2020-09-09 RX ORDER — IOPAMIDOL 755 MG/ML
103 INJECTION, SOLUTION INTRAVASCULAR ONCE
Status: COMPLETED | OUTPATIENT
Start: 2020-09-09 | End: 2020-09-09

## 2020-09-09 RX ORDER — TRAZODONE HYDROCHLORIDE 50 MG/1
TABLET, FILM COATED ORAL
Qty: 90 TABLET | Refills: 1 | OUTPATIENT
Start: 2020-09-09

## 2020-09-09 RX ADMIN — IOPAMIDOL 103 ML: 755 INJECTION, SOLUTION INTRAVASCULAR at 08:06

## 2020-09-14 ENCOUNTER — MYC REFILL (OUTPATIENT)
Dept: NEUROLOGY | Facility: CLINIC | Age: 60
End: 2020-09-14

## 2020-09-14 DIAGNOSIS — G47.00 INSOMNIA, UNSPECIFIED TYPE: ICD-10-CM

## 2020-09-14 RX ORDER — TRAZODONE HYDROCHLORIDE 50 MG/1
50 TABLET, FILM COATED ORAL AT BEDTIME
Qty: 30 TABLET | Refills: 4 | Status: SHIPPED | OUTPATIENT
Start: 2020-09-14 | End: 2020-12-21

## 2020-09-30 ENCOUNTER — MYC MEDICAL ADVICE (OUTPATIENT)
Dept: FAMILY MEDICINE | Facility: CLINIC | Age: 60
End: 2020-09-30

## 2020-09-30 DIAGNOSIS — K21.9 CHRONIC GASTROESOPHAGEAL REFLUX DISEASE: ICD-10-CM

## 2020-09-30 DIAGNOSIS — R49.9 HOARSENESS OR CHANGING VOICE: ICD-10-CM

## 2020-09-30 DIAGNOSIS — R05.3 CHRONIC COUGH: Primary | ICD-10-CM

## 2020-09-30 DIAGNOSIS — R49.9 HOARSENESS OR CHANGING VOICE: Primary | ICD-10-CM

## 2020-09-30 DIAGNOSIS — R05.3 PERSISTENT COUGH: Primary | ICD-10-CM

## 2020-09-30 RX ORDER — PANTOPRAZOLE SODIUM 20 MG/1
40 TABLET, DELAYED RELEASE ORAL DAILY
Qty: 60 TABLET | Refills: 0 | Status: CANCELLED | OUTPATIENT
Start: 2020-09-30

## 2020-09-30 NOTE — TELEPHONE ENCOUNTER
Last time prescribed: 9/1/20 , 60 tabs/caps x 0 refills  Last office visit: 8/25/20  Next appointment: No Future Appointment Scheduled     Note pt has sent Mychart to Dr. sAh today.    Prescription approved per [facility] Refill Protocol.  Just one month supply.  Will have been on it for 3 months total now with this months refill.     Wanda Holloway RN  October 1, 2020 11:33 AM      I have called IT , as unable to sign order due to diagnosis.  I changed it, yet still unable to authorize?  IT unable to solve problem     I will enter refill and start again.    Still not working.      Quentin, I have tried multiple reflux dx and keep getting stopped to point of where I can not sign /authorize this refill.     Can you find a dx that will work and is accurate for this pt?  Plus note, he has sent you a Mychart about this condition yesterday  too. Hence, why I only cued up for one more month    Let me know if you can't solve dx issue and I can call it in I guess?  If you still want him on it.     Wanda Holloway RN  October 1, 2020 3:31 PM

## 2020-10-01 RX ORDER — PANTOPRAZOLE SODIUM 20 MG/1
40 TABLET, DELAYED RELEASE ORAL DAILY
Qty: 60 TABLET | Refills: 0 | Status: CANCELLED | OUTPATIENT
Start: 2020-10-01

## 2020-10-01 RX ORDER — PANTOPRAZOLE SODIUM 40 MG/1
40 TABLET, DELAYED RELEASE ORAL DAILY
Qty: 30 TABLET | Refills: 0 | Status: SHIPPED | OUTPATIENT
Start: 2020-10-01 | End: 2021-03-17

## 2020-10-01 NOTE — TELEPHONE ENCOUNTER
FUTURE VISIT INFORMATION      FUTURE VISIT INFORMATION:    Date: 10/26/20    Time: 9 AM    Location: [facility]  REFERRAL INFORMATION:    Referring provider:  Dr. Shae Brumfield    Referring providers clinic:  [facility]    Reason for visit/diagnosis: Hoarseness or Changing in Voice    RECORDS REQUESTED FROM:       Clinic name Comments Records Status Imaging Status   [facility] 9/30/20 - Referral from Dr. Brumfield  8/7/20 - PCC OV with Dr. Ash  1/17/18, 4/7/17 - PCC OV with Dr. Brumfield [facility] 6/10/20 - NEUROSURG VV with Dr. Ahn [facility] 7/16/20 - PCC OV with Dr. Garcia and Dr. Mijares [facility] Everywhere

## 2020-10-13 ENCOUNTER — MYC REFILL (OUTPATIENT)
Dept: FAMILY MEDICINE | Facility: CLINIC | Age: 60
End: 2020-10-13

## 2020-10-13 DIAGNOSIS — R05.9 COUGH: ICD-10-CM

## 2020-10-13 RX ORDER — ALBUTEROL SULFATE 90 UG/1
2 AEROSOL, METERED RESPIRATORY (INHALATION) EVERY 4 HOURS PRN
Qty: 1 INHALER | Refills: 11 | Status: SHIPPED | OUTPATIENT
Start: 2020-10-13 | End: 2021-07-02

## 2020-10-13 NOTE — TELEPHONE ENCOUNTER
Last visit 8/25/20, no future visit  Prescription approved per G Refill Protocol.  Jenna Dwyer RN  [facility]

## 2020-10-14 NOTE — TELEPHONE ENCOUNTER
[facility] Ag[facility] to [facility] today. Ventolin Rx appears to have been placed by PCP, Dr. Brumfield, yesterday.     Diagnoses and all orders for this visit:    Persistent cough  -     XR Chest 2 Views; Future        Quentin Ash MD  9:09 AM, October 14, 2020

## 2020-10-16 ENCOUNTER — MYC MEDICAL ADVICE (OUTPATIENT)
Dept: FAMILY MEDICINE | Facility: CLINIC | Age: 60
End: 2020-10-16

## 2020-10-16 ENCOUNTER — ANCILLARY PROCEDURE (OUTPATIENT)
Dept: GENERAL RADIOLOGY | Facility: CLINIC | Age: 60
End: 2020-10-16
Attending: FAMILY MEDICINE
Payer: COMMERCIAL

## 2020-10-16 DIAGNOSIS — R05.3 PERSISTENT COUGH: Primary | ICD-10-CM

## 2020-10-16 DIAGNOSIS — R05.3 PERSISTENT COUGH: ICD-10-CM

## 2020-10-16 PROCEDURE — 71046 X-RAY EXAM CHEST 2 VIEWS: CPT | Mod: GC | Performed by: RADIOLOGY

## 2020-10-19 NOTE — TELEPHONE ENCOUNTER
MyChart correspondence, patient with persistent cough for several months and noted in chart. Requests referral to pulmonology as per my suggestion. Ordered as noted below.     Diagnoses and all orders for this visit:    Persistent cough  -     PULMONARY MEDICINE REFERRAL      Quentin Ash MD  7:50 AM, October 19, 2020

## 2020-10-20 DIAGNOSIS — R06.00 DYSPNEA: Primary | ICD-10-CM

## 2020-10-20 NOTE — TELEPHONE ENCOUNTER
RECORDS RECEIVED FROM: internal    DATE RECEIVED: 11.2.20    NOTES STATUS DETAILS   OFFICE NOTE from referring provider Internal  Quentin Ash   OFFICE NOTE from other specialist Internal  Brissa Kaufman    DISCHARGE SUMMARY from hospital na    DISCHARGE REPORT from the ER na    MEDICATION LIST Internal     IMAGING  (NEED IMAGES AND REPORTS)     CT SCAN Internal     CHEST XRAY (CXR) Internal  10.16.20,    TESTS     PULMONARY FUNCTION TESTING (PFT) In process         Action 10.20.20 sv   Action Taken Message sent to nurse pool to place PFT order for pt

## 2020-10-23 ENCOUNTER — MYC MEDICAL ADVICE (OUTPATIENT)
Dept: OTOLARYNGOLOGY | Facility: CLINIC | Age: 60
End: 2020-10-23

## 2020-10-26 ENCOUNTER — TELEPHONE (OUTPATIENT)
Dept: OTOLARYNGOLOGY | Facility: CLINIC | Age: 60
End: 2020-10-26

## 2020-10-26 ENCOUNTER — VIRTUAL VISIT (OUTPATIENT)
Dept: OTOLARYNGOLOGY | Facility: CLINIC | Age: 60
End: 2020-10-26

## 2020-10-26 ENCOUNTER — VIRTUAL VISIT (OUTPATIENT)
Dept: OTOLARYNGOLOGY | Facility: CLINIC | Age: 60
End: 2020-10-26
Attending: INTERNAL MEDICINE
Payer: COMMERCIAL

## 2020-10-26 ENCOUNTER — PRE VISIT (OUTPATIENT)
Dept: OTOLARYNGOLOGY | Facility: CLINIC | Age: 60
End: 2020-10-26

## 2020-10-26 VITALS
TEMPERATURE: 98.1 F | BODY MASS INDEX: 23.52 KG/M2 | HEIGHT: 71 IN | HEART RATE: 74 BPM | OXYGEN SATURATION: 97 % | WEIGHT: 168 LBS

## 2020-10-26 DIAGNOSIS — R13.19 OTHER DYSPHAGIA: ICD-10-CM

## 2020-10-26 DIAGNOSIS — J38.7 IRRITABLE LARYNX SYNDROME: ICD-10-CM

## 2020-10-26 DIAGNOSIS — R49.0 DYSPHONIA: Primary | ICD-10-CM

## 2020-10-26 DIAGNOSIS — R06.02 SHORTNESS OF BREATH: ICD-10-CM

## 2020-10-26 DIAGNOSIS — R07.89 CHEST TIGHTNESS: ICD-10-CM

## 2020-10-26 DIAGNOSIS — R05.3 CHRONIC COUGH: ICD-10-CM

## 2020-10-26 DIAGNOSIS — R05.9 COUGH: ICD-10-CM

## 2020-10-26 PROCEDURE — 99207 PR NO CHARGE LOS: CPT | Performed by: SPEECH-LANGUAGE PATHOLOGIST

## 2020-10-26 PROCEDURE — 92524 BEHAVRAL QUALIT ANALYS VOICE: CPT | Mod: GN | Performed by: SPEECH-LANGUAGE PATHOLOGIST

## 2020-10-26 PROCEDURE — 31579 LARYNGOSCOPY TELESCOPIC: CPT | Performed by: OTOLARYNGOLOGY

## 2020-10-26 PROCEDURE — 99204 OFFICE O/P NEW MOD 45 MIN: CPT | Mod: 25 | Performed by: OTOLARYNGOLOGY

## 2020-10-26 ASSESSMENT — MIFFLIN-ST. JEOR: SCORE: 1589.17

## 2020-10-26 ASSESSMENT — PAIN SCALES - GENERAL: PAINLEVEL: NO PAIN (0)

## 2020-10-26 NOTE — TELEPHONE ENCOUNTER
SCHEDULING INSTRUCTIONS:    1. 3-4 month in clinic follow up with Dr. Smith.    2. Video Swallow Study and Esophagram, per patient's schedule in [facility] w/Mandi James, Urvashi Bolden or Kamari Colunga.    3. Voice therapy w.Susana Sweeney, Timi Mejia, or Anjali Mckeon as follows:   2 appointments with approximately 7 days between each  2 appointments with approximately 14 days between each  1 follow-up appointments in 4-6 weeks after the 4th session. (Please keep w/same provider as fist therapy appointment please)    Thank you.

## 2020-10-26 NOTE — NURSING NOTE
"Chief Complaint   Patient presents with     Consult     Chronic cough, dysphonia       Pulse 74, temperature 98.1  F (36.7  C), temperature source Temporal, height 1.803 m (5' 11\"), weight 76.2 kg (168 lb), SpO2 97 %.    Evon Iniguez, EMT  "

## 2020-10-26 NOTE — PROGRESS NOTES
Dear Dr. Brumfield:    I had the pleasure of meeting Tommy Lerner in consultation at the [facility] of the [facility] at your request, for evaluation of throat concerns. The note from our visit follows. Speech recognition software may have been used in the documentation below; input is reviewed before signature to the best of my ability. I appreciate the opportunity to participate in the care of this pleasant patient.    Please feel free to contact me with any questions.    Sincerely yours,    Iva Smith M.D., M.P.H.  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 Symptoms include throat tightness, mucus in throat, frequent throat-clearing and frequent cough.      Voice  He notes a three to four month history of voice problems.  This bothers him a little bit. Symptoms began gradually and is getting worse over time.  He has had no prior voice treatment.  His typical vocal demand is routine and used for work.  He notes vocal fatigue and worsening with use.  By the end of the day, his voice and his cough are both worse.  He feels his voice has gotten lower and more hoarse over time.       Swallowing  He reports a three month history of swallowing problems.  Pills take extra effort.  He coughs when he eats.  It is worse after heavy voice use, and sometimes things feel like they are about to go down the wrong tube, particularly with his own saliva.  He does not usually choke on oral intake.  He feels like something is leaking and causing him to cough.  He notes no regurgitation.  He has had no swallow  study.      Cough/Throat-clearing  Essentially, he feels that he coughs/throat-clears constantly.  He says it has been a lifelong problem but is worse over the past five or more years, and is worse over the past three to four months.  He feels a tightness in his chest.  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  He has not seen a Pulmonologist.      A chest x-ray showed some opacification as noted below.    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  This began gradually and is getting worse.  Triggers including talking, eating, drinking, heartburn, and sometimes exercising.  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  The cough starts with a sensation in his throat/chest, and is generally dry/non-productive.  He notes he can go hours without coughing but also has coughing fits.  Lying down does not bring it on.  Ventolin helps the cough, but initially provokes a more productive cough.      Breathing  He reports that his chest feels tight.  It is not currently limiting his athletic activities, but he feels like it would be a problem if he were racing.      He has a Pulmonary evaluation pending.  He has a family history of IPF (father).      Throat discomfort  No concerns.       Reflux-type symptoms  He experiences heartburn/indigestion rarely. He is not taking reflux medications.  He took pantoprazole for twp months but stopped because it did not help.  Sometimes, he takes Pepcid AC, which seems to help.  He notes that there is a family history of reflux problems. Dairy and acidic foods as well as late night eating all make things worse.    He has had no prior Gastroenterology evaluation.      Prior Epic/outside records were reviewed for this visit. He reports that his daughter was diagnosed with COVID.      MEDICATIONS:     Current Outpatient Medications   Medication Sig Dispense  Refill     albuterol (VENTOLIN HFA) 108 (90 Base) MCG/ACT inhaler Inhale 2 puffs into the lungs every 4 hours as needed for shortness of breath / dyspnea or wheezing 1 Inhaler 11     Cholecalciferol (VITAMIN D3) 50 MCG (2000 UT) CAPS Take 2,000 Units by mouth daily (Winter Months)       MAGNESIUM PO Take 1 tablet by mouth daily       Omega-3 Fatty Acids (FISH OIL PO) Take 1 capsule by mouth daily       sildenafil (VIAGRA) 100 MG tablet Take 1 tablet (100 mg) by mouth daily as needed (ED) Take 30min- 4 hrs before intercourse.No use with nitroglycerin, terazosin or doxazosin. 30 tablet 5     traZODone (DESYREL) 50 MG tablet Take 1 tablet (50 mg) by mouth At Bedtime 30 tablet 4     pantoprazole (PROTONIX) 40 MG EC tablet Take 1 tablet (40 mg) by mouth daily (Patient not taking: Reported on 10/26/2020) 30 tablet 0       ALLERGIES:  No Known Allergies    PAST MEDICAL HISTORY:   Past Medical History:   Diagnosis Date     Chronic cough         PAST SURGICAL HISTORY:   Past Surgical History:   Procedure Laterality Date     vasectomy         HABITS/SOCIAL HISTORY:    Social History     Tobacco Use     Smoking status: Never Smoker     Smokeless tobacco: Former User     Types: Chew   Substance Use Topics     Alcohol use: Yes     Alcohol/week: 0.0 standard drinks     Comment: mostly on weekends, 1-2 drinks         FAMILY HISTORY:    Family History   Problem Relation Age of Onset     Pancreatic Cancer Maternal Grandmother      Hyperlipidemia Father      Other - See Comments Father         idiopathic pulmonary fibrosis     Other - See Comments Other         Etoh dependence, dad and brother     Diabetes Other         m unc, type I     Other - See Comments Mother 80        memory loss     Sleep Apnea Mother      REVIEW OF SYSTEMS:  The patient completed a comprehensive 11 point review of systems (below), which was reviewed. Positives are as noted below; pertinent findings are as noted in the HPI.     Patient Supplied Answers to  Review of Systems   Noncontributory      PHYSICAL EXAMINATION:  General: The patient was alert and conversant, and in no acute distress.    Eyes: PERRL, conjunctiva and lids normal, sclera nonicteric.  Nose: Anterior rhinoscopy: no gross abnormalities. no  bleeding; no  mucopurulence; septum grossly normal, mild mucoid drainage and/or crusting.  Oral cavity/oropharynx: No masses or lesions. Dentition in good condition. Floor of mouth and oral tongue soft to palpation. Tongue mobility and palate elevation intact and symmetric.  Ears: Normal auricles, external auditory canals bilaterally. Visualized portions of tympanic membranes normal bilaterally.   Neck: No palpable cervical lymphadenopathy. There was mild tenderness to palpation of the thyrohyoid space, which was narrow. No obvious thyroid abnormality. Landmarks palpable.  Resp: Breathing comfortably, no stridor or stertor.  Neuro: Symmetric facial function. Other cranial nerves as documented above.  Psych: Normal affect, pleasant and cooperative.  Voice/speech: Mild to moderate dysphonia characterized by roughness and glottal arrington.  Extremities: No cyanosis, clubbing, or edema of the upper extremities.    Intake scores  Total Score for Last Patient-Answered VHI Questionnaire  No flowsheet data found.  Total Score for Last Patient-Answered EAT Questionnaire  No flowsheet data found.  Total Score for Last Patient-Answered CSI Questionnaire  No flowsheet data found.      PROCEDURE:   Flexible fiberoptic laryngoscopy and laryngovideostroboscopy  Indications: This procedure was warranted to evaluate the patient's laryngeal anatomy and function. Risks, benefits, and alternatives were discussed.  Description: After written informed consent was obtained, a time-out was performed to confirm patient identity, procedure, and procedure site. Topical 3% lidocaine with 0.25% phenylephrine was applied to the nasal cavities. I performed the endoscopy and no complications were  apparent. Continuous and stroboscopic light were utilized to assess routine phonation and variable frequency phonation.  Performed by: Iva Smith MD MPH  SLP: Taniya Sweeney, PhD, CCC-SLP   Findings: Normal nasopharynx. Normal base of tongue, valleculae, and epiglottis. Vocal fold mobility: right: normal; left: normal. Medial edges of the vocal folds: smooth with mild prominence of the mid vocal fold on the right.   Glissade produced appropriate elongation. There was moderate supraglottic recruitment with connected speech. Mucosa of false vocal folds, aryepiglottic folds, piriform sinuses, and posterior glottis unremarkable. Airway was patent.   Similar findings on NBI.    The addition of stroboscopy allowed evaluation of the mucosal wave.   Amplitude: right: mildly decreased; left: normal. Symmetry: intermittent symmetry. Closure pattern: complete. Closure plane: at glottic level. Phase distribution: normal.             IMAGING/RESULTS reviewed, with pertinent report excerpts below:  Exam: XR CHEST 2 VW, 10/16/2020   Impression:   Subtle left basilar airspace opacities which may represent atelectasis  versus aspiration or infection.    IMPRESSION AND PLAN:   Tommy Lerner presents with dysphonia, dysphagia, chronic cough, and chest tightness. On exam we noted supraglottic hyperfunction and mild thickening of the right medial mid true vocal fold, which may be related to ongoing phonotrauma    Recommendations  1) Voice: I recommended speech therapy as initial primary management, with goals including decreasing vocal fold trauma, improving respiratory/phonatory coordination and improving phonatory mechanics.      2) Swallowing: I recommended a video fluoro swallow study with esophagram for further evaluation of swallowing. I encouraged immediate adoption of any strategies and/or exercises learned at that evaluation.     3) Cough/throat-clearing: We discussed that cough can often be multifactorial but  frequently has a component of laryngeal hyperreactivity that is perpetuated by the ongoing vocal fold trauma. I recommended speech therapy as initial primary management, with goals including improving laryngeal hygiene, reducing laryngeal irritability, improving laryngeal efficiency and decreasing vocal fold trauma.  We also discussed that in rare cases, use of neuromodulating medications is necessary for cough management, although that is not first line treatment because of potential side effects such as sedation, fatigue, etc. Another option is superior laryngeal nerve injections. If the cough persists, we can discuss this further.    4) Breathing: He has an appointment pending with a pulmonologist.    He will plan to return to clinic in three to four months to assess updated status of larynx, and to assess progress with multiple concerns as above.I appreciate the opportunity to participate in the care of this pleasant patient.

## 2020-10-26 NOTE — LETTER
"10/26/2020       RE: Tommy Lerner  [address]  [address]  [place] [address]     Dear Colleague,    Thank you for referring your patient, Tommy Lerner, to the [facility] [place] at [facility]. Please see a copy of my visit note below.      Tommy Lerner is a 60 year old male who is being cared for via a billable virtual visit.      The Tommy Lerner has been notified and verbally consented to the following:     \"This billable virtual visit will be conducted between you and your provider.\"     \"Patient has opted to conduct today's billable virtual visit vs an in-person appointment, and is not able to attend due to possible exposure to COVID-19\"    Video visits are billed at different rates depending on your insurance coverage.  Please reach out to your insurance provider with any questions.    If during the course of the call the provider feels the appointment is not appropriate, you will not be charged for this service.\"     Provider has received verbal consent for billable virtual visit from the patient? Yes    Will anyone else be joining your video visit? This is a joint visit with Dr. Smith, who is seeing the patient in the clinic.    Preferred method for receiving information: email    Video Visit Details:  Call initiated at: 9:33 AM  Call ended at: 10:38  Platform used to conduct today's virtual appointment: Soum  Location of provider: [facility]  Location of patient: Clinic    [facility]  Ashish Manzo Jr., M.D., F.A.C.S.  Iva Smith M.D., M.P.H.  Jazmine Lombardi M.D.  Taniya Sweeney, Ph.D., CCC/SLP  Anjali Mckeon M.M. (voice), M.A., CCC/SLP  Timi Mejia M.M. (voice), M.A., CCC/SLP    [facility]  VOICE/SPEECH/BREATHING EVALUATION AND LARYNGEAL EXAMINATION REPORT    Patient: Tommy Lerner  Date of Visit: 10/26/2020    Clinician: Taniya Sweeney, Ph.D., CCC/SLP  Seen in conjunction with: " Dr. Smith  Referring Physician: Dr. Kelsi Valencia    CHIEF COMPLAINT:  cough    HISTORY  Tommy Lerner is a 60 year old presenting today for evaluation of cough and dysphonia.    Please refer to Dr. Smith s dictation for a more complete history and impressions.   Salient details of his history are as follows:    5 year Hx chronic cough, worsening over past 3-4 months    Now the cough feels pulmonary, and he feels tightness in the chest, and as if something is in his lungs    This does not interfere with high-level athleticism    His breathing has been okay until recently, but now does not feel good    A recent chest CT shows some opacities in his lungs     He has not had a full pulmonary work-up, but it is scheduled for a week from now    Using a Ventolin inhaler helps reduce the cough, but also makes the cough feel more productive     The productivity of the cough is continually increasing    The cough makes him feel that he may be aspirating something in his throat; this is worse with his own secretions    The cough is worse with dairy products    Proton pump inhibitors were no help with the cough    Reflux precautions don't change the cough much, though he does note some dietary triggers, as well as eating close to bedtime    Once or twice a week he gets a cough episode that results in nausea and gagging is    He does not have a day without coughing    Experiences vocal fatigue and increasing raspiness of his voice quality as he continues to talk    Extensive voice use makes his cough worse; this happens even after an hour of talking     Heavy vocal demand in his job    DIAGNOSIS/REASON FOR REFERRAL  Dysphonia/ Evaluate, perform laryngeal exam, treat as appropriate    CURRENT SYMPTOMS INCLUDE:  VOICE    Increasing discomfort/effort and raspiness as he talks; Sxs come on in an hour of talking  COUGH/THROAT CLEARING    Frequent dry cough or throat-clear, not sharp but not productive    his cough/ throat  clearing triggers include:           Voice use, Reflux (though cough is not increased at night or when lying down)  SWALLOWING    Some effort with swallowing pills; no apparent aspiration  ADDITIONAL    Breathing problems described above    Denies significant dyspnea, dysphagia, or pain    OTHER PERTINENT HISTORY    Unknown; please refer to Dr. Smith's note and elsewhere in this chart    VOICE/ SPEECH/ NON-COMMUNICATIVE LARYNGEAL BEHAVIORS EVALUATION  An evaluation of the voice and cough was accomplished today; salient features are as follows:    Palpation of the laryngeal area shows:    Tightness of the thyrohyoid area, and tenderness to palpation    Cough/ Throat clear:    Occasional    Dry, but messy    Locus of cough/ throat clear: sounds consistent with upper airway    Mild in severity    When Dr. Smith models a gentle throat clear, he responds with a very heavy throat clear    Breathing pattern:    Apparent tight abdominal musculature, with minimal rib cage or abdominal movement for breathing    No overt tension is evident.    Voice quality is characterized by    Within normal limits quality    Slight narrow resonance, though well within normal limits    No glottal arrington; no roughness, breathiness, or strain    Habitual pitch is within normal limits at B2 to D3, with appropriate use of pitch range    Loudness is WNL and appropriate for the setting    Sustained vowels: All within normal limits    A singing task shows voice quality is consistent between speech and singing, with good quality in the range from Ab2 to Ab3    In a pitch glide task to determine pitch range, he shows normal pitch range, with good ability to access falsetto register    he states today his voice is a little raspy, but it is more of a feeling than a sound    When it gets more raspy, it is more noticeable, with an extra sound along with his regular tone quality    LARYNGEAL EXAMINATION    Endoscopic laryngeal examination was performed  by:  Iva Smith MD,   I provided the protocol of instructions for the patient.  Type of exam:   Flexible endoscopy with chip-tip technology and stroboscopy  This exam shows:  Laryngeal and Vocal Fold Mucosa    Essentially healthy laryngeal mucosa    Moderate presence of sticky, thickened secretions throughout the laryngeal area    Status of vocal fold mucosa:   o mildly and diffusely edematous or thickened; L>R   o Otherwise within normal limits, with no lesions and essentially straight vibratory margins    Neurological and Functional Integrity of the Larynx    Vocal folds are mobile and meet at midline; movement is brisk and symmetric; exam is neurologically normal     Airway is normal    Normal function is evident during a task of 20 quickly repeated vowels    Elongation of the vocal folds for pitch increase is good    On phonation, glottic closure is complete; no glottic gap noted    Supraglottic Function and Therapy Probes    Inconsistent mild to moderate anterior-posterior constriction of the supraglottic larynx during connected speech  o This increases as speech continues    Stroboscopy provided the following additional information:    Stroboscopy shows a normal mucosal wave at habitual pitch, but out-of-phase mucosal wave at higher and lower pitch    Dr. Smith and I reviewed this laryngeal exam with Mr. Lerner today, and I provided pertinent explanations:    Endoscopic findings are consistent with audio-perceptual assessment and patient Hx/complaints.    his symptoms are accounted for by mild mucosal swelling, apparent mucosa irritation with secretion collection, and muscular hyperfunction that increases as he speaks     Because there appears to be a functional component to his symptoms, he would most likely benefit from functional speech therapy.    Dr. Smith has also recommended a swallow study because of the opacities found in the chest CT    ASSESSMENT / PLAN  IMPRESSIONS:  This evaluation has  resulted in the following diagnosis/diagnoses for Mr. Lerner  R05 (Chronic Cough)  R49.0 (Dysphonia)  J38.7 (Irritable Larynx Syndrome).      Laryngeal evaluation demonstrated both mucosal and muscular findings    The thickening of the vocal fold mucosa is likely due to chronic cough, but results in increasing dysphonia and vocal fatigue, as well as hyperfunction and irritation that leads to more cough    Perceptual evaluation demonstrated WNL voice quality and mild occasional cough, at a time when he is very mildly symptomatic    RECOMMENDATIONS:     A course of speech therapy is recommended to optimize vocal technique, improve voice quality, promote reduced discomfort, effort and fatigue and help reduce chronic cough, throat clear, mucosal irritation and breathing problems.    He demonstrates a Good prognosis for improvement given adherence to therapeutic recommendations. Therapeutic     Positive indicators: commitment to process; diagnosis is known to respond to functional treatment;     Negative indicators: none      A swallow study has also been ordered, and we encourage the upcoming Pulmonary work-up, as well as possible GI work-up    TREATMENT PLAN  Speech therapy    DURATION/FREQUENCY OF TREATMENT  Six weekly or bi-weekly one-hour sessions, with four monthly one-hour follow-up sessions    GOALS  Patient goal:    To have a normal and acceptable voice quality, comfort, and stamina for all his voice needs  To reduce his cough and throat-clear to acceptable levels  To breathe normally and comfortably in all situations    Long-term goal(s): In 6 months, Mr. Lerner will:  1.  Report a week of typical vocal activities, in which dysphonia and vocal fatigue do not exceed a level of 2 out of 10, 80% of the time   2.  Report a week of typical activities, in which sensaton of dyspnea does not exceed a level of 2 out of 10, 80% of the time  3.  Report a week with no more than two episodes of coughing or  throat-clearing per day, that do not last more than two seconds    This treatment plan was developed with the patient who agreed with the recommendations.    TOTAL SERVICE TIME: 65 minutes  EVALUATION OF VOICE AND RESONANCE ([id number])  NO CHARGE FACILITY FEE       Taniya Sweeney, Ph.D., [facility]-SLP  Speech-Language Pathologist  Director, [facility]  [phone]

## 2020-10-26 NOTE — LETTER
10/26/2020      RE: Tommy Lrener  [address]  [address]  [place] [address]       Dear Dr. Brumfield:    I had the pleasure of meeting Tommy Lerner in consultation at the [facility] of the [facility] at your request, for evaluation of throat concerns. The note from our visit follows. Speech recognition software may have been used in the documentation below; input is reviewed before signature to the best of my ability. I appreciate the opportunity to participate in the care of this pleasant patient.    Please feel free to contact me with any questions.    Sincerely yours,    Iva Smith M.D., M.P.H.  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 Symptoms include throat tightness, mucus in throat, frequent throat-clearing and frequent cough.      Voice  He notes a three to four month history of voice problems.  This bothers him a little bit. Symptoms began gradually and is getting worse over time.  He has had no prior voice treatment.  His typical vocal demand is routine and used for work.  He notes vocal fatigue and worsening with use.  By the end of the day, his voice and his cough are both worse.  He feels his voice has gotten lower and more hoarse over time.       Swallowing  He reports a three month history of swallowing problems.  Pills take extra effort.  He coughs when he eats.  It is worse after heavy voice use, and sometimes things feel like they are about to go down the wrong tube, particularly with his own saliva.  He does not usually choke on oral intake.  He feels like something is  leaking and causing him to cough.  He notes no regurgitation.  He has had no swallow study.      Cough/Throat-clearing  Essentially, he feels that he coughs/throat-clears constantly.  He says it has been a lifelong problem but is worse over the past five or more years, and is worse over the past three to four months.  He feels a tightness in his chest.  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  He has not seen a Pulmonologist.      A chest x-ray showed some opacification as noted below.    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  This began gradually and is getting worse.  Triggers including talking, eating, drinking, heartburn, and sometimes exercising.  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  The cough starts with a sensation in his throat/chest, and is generally dry/non-productive.  He notes he can go hours without coughing but also has coughing fits.  Lying down does not bring it on.  Ventolin helps the cough, but initially provokes a more productive cough.      Breathing  He reports that his chest feels tight.  It is not currently limiting his athletic activities, but he feels like it would be a problem if he were racing.      He has a Pulmonary evaluation pending.  He has a family history of IPF (father).      Throat discomfort  No concerns.       Reflux-type symptoms  He experiences heartburn/indigestion rarely. He is not taking reflux medications.  He took pantoprazole for twp months but stopped because it did not help.  Sometimes, he takes Pepcid AC, which seems to help.  He notes that there is a family history of reflux problems. Dairy and acidic foods as well as late night eating all make things worse.    He has had no prior Gastroenterology evaluation.      Prior Epic/outside records were reviewed for this visit. He reports that his daughter was diagnosed with  COVID.      MEDICATIONS:     Current Outpatient Medications   Medication Sig Dispense Refill     albuterol (VENTOLIN HFA) 108 (90 Base) MCG/ACT inhaler Inhale 2 puffs into the lungs every 4 hours as needed for shortness of breath / dyspnea or wheezing 1 Inhaler 11     Cholecalciferol (VITAMIN D3) 50 MCG (2000 UT) CAPS Take 2,000 Units by mouth daily (Winter Months)       MAGNESIUM PO Take 1 tablet by mouth daily       Omega-3 Fatty Acids (FISH OIL PO) Take 1 capsule by mouth daily       sildenafil (VIAGRA) 100 MG tablet Take 1 tablet (100 mg) by mouth daily as needed (ED) Take 30min- 4 hrs before intercourse.No use with nitroglycerin, terazosin or doxazosin. 30 tablet 5     traZODone (DESYREL) 50 MG tablet Take 1 tablet (50 mg) by mouth At Bedtime 30 tablet 4     pantoprazole (PROTONIX) 40 MG EC tablet Take 1 tablet (40 mg) by mouth daily (Patient not taking: Reported on 10/26/2020) 30 tablet 0       ALLERGIES:  No Known Allergies    PAST MEDICAL HISTORY:   Past Medical History:   Diagnosis Date     Chronic cough         PAST SURGICAL HISTORY:   Past Surgical History:   Procedure Laterality Date     vasectomy         HABITS/SOCIAL HISTORY:    Social History     Tobacco Use     Smoking status: Never Smoker     Smokeless tobacco: Former User     Types: Chew   Substance Use Topics     Alcohol use: Yes     Alcohol/week: 0.0 standard drinks     Comment: mostly on weekends, 1-2 drinks         FAMILY HISTORY:    Family History   Problem Relation Age of Onset     Pancreatic Cancer Maternal Grandmother      Hyperlipidemia Father      Other - See Comments Father         idiopathic pulmonary fibrosis     Other - See Comments Other         Etoh dependence, dad and brother     Diabetes Other         m [facility], type I     Other - See Comments Mother 80        memory loss     Sleep Apnea Mother      REVIEW OF SYSTEMS:  The patient completed a comprehensive 11 point review of systems (below), which was reviewed. Positives are as noted  below; pertinent findings are as noted in the HPI.     Patient Supplied Answers to Review of Systems   Noncontributory      PHYSICAL EXAMINATION:  General: The patient was alert and conversant, and in no acute distress.    Eyes: PERRL, conjunctiva and lids normal, sclera nonicteric.  Nose: Anterior rhinoscopy: no gross abnormalities. no  bleeding; no  mucopurulence; septum grossly normal, mild mucoid drainage and/or crusting.  Oral cavity/oropharynx: No masses or lesions. Dentition in good condition. Floor of mouth and oral tongue soft to palpation. Tongue mobility and palate elevation intact and symmetric.  Ears: Normal auricles, external auditory canals bilaterally. Visualized portions of tympanic membranes normal bilaterally.   Neck: No palpable cervical lymphadenopathy. There was mild tenderness to palpation of the thyrohyoid space, which was narrow. No obvious thyroid abnormality. Landmarks palpable.  Resp: Breathing comfortably, no stridor or stertor.  Neuro: Symmetric facial function. Other cranial nerves as documented above.  Psych: Normal affect, pleasant and cooperative.  Voice/speech: Mild to moderate dysphonia characterized by roughness and glottal arrington.  Extremities: No cyanosis, clubbing, or edema of the upper extremities.    Intake scores  Total Score for Last Patient-Answered VHI Questionnaire  No flowsheet data found.  Total Score for Last Patient-Answered EAT Questionnaire  No flowsheet data found.  Total Score for Last Patient-Answered CSI Questionnaire  No flowsheet data found.      PROCEDURE:   Flexible fiberoptic laryngoscopy and laryngovideostroboscopy  Indications: This procedure was warranted to evaluate the patient's laryngeal anatomy and function. Risks, benefits, and alternatives were discussed.  Description: After written informed consent was obtained, a time-out was performed to confirm patient identity, procedure, and procedure site. Topical 3% lidocaine with 0.25% phenylephrine was  applied to the nasal cavities. I performed the endoscopy and no complications were apparent. Continuous and stroboscopic light were utilized to assess routine phonation and variable frequency phonation.  Performed by: Iva Smith MD MPH  SLP: Taniya Sweeney, PhD, CCC-SLP   Findings: Normal nasopharynx. Normal base of tongue, valleculae, and epiglottis. Vocal fold mobility: right: normal; left: normal. Medial edges of the vocal folds: smooth with mild prominence of the mid vocal fold on the right.   Glissade produced appropriate elongation. There was moderate supraglottic recruitment with connected speech. Mucosa of false vocal folds, aryepiglottic folds, piriform sinuses, and posterior glottis unremarkable. Airway was patent.   Similar findings on NBI.    The addition of stroboscopy allowed evaluation of the mucosal wave.   Amplitude: right: mildly decreased; left: normal. Symmetry: intermittent symmetry. Closure pattern: complete. Closure plane: at glottic level. Phase distribution: normal.             IMAGING/RESULTS reviewed, with pertinent report excerpts below:  Exam: XR CHEST 2 VW, 10/16/2020   Impression:   Subtle left basilar airspace opacities which may represent atelectasis  versus aspiration or infection.    IMPRESSION AND PLAN:   Tommy Lerner presents with dysphonia, dysphagia, chronic cough, and chest tightness. On exam we noted supraglottic hyperfunction and mild thickening of the right medial mid true vocal fold, which may be related to ongoing phonotrauma    Recommendations  1) Voice: I recommended speech therapy as initial primary management, with goals including decreasing vocal fold trauma, improving respiratory/phonatory coordination and improving phonatory mechanics.      2) Swallowing: I recommended a video fluoro swallow study with esophagram for further evaluation of swallowing. I encouraged immediate adoption of any strategies and/or exercises learned at that evaluation.      3) Cough/throat-clearing: We discussed that cough can often be multifactorial but frequently has a component of laryngeal hyperreactivity that is perpetuated by the ongoing vocal fold trauma. I recommended speech therapy as initial primary management, with goals including improving laryngeal hygiene, reducing laryngeal irritability, improving laryngeal efficiency and decreasing vocal fold trauma.  We also discussed that in rare cases, use of neuromodulating medications is necessary for cough management, although that is not first line treatment because of potential side effects such as sedation, fatigue, etc. Another option is superior laryngeal nerve injections. If the cough persists, we can discuss this further.    4) Breathing: He has an appointment pending with a pulmonologist.    He will plan to return to clinic in three to four months to assess updated status of larynx, and to assess progress with multiple concerns as above.I appreciate the opportunity to participate in the care of this pleasant patient.         Iva Smith MD

## 2020-10-26 NOTE — PROGRESS NOTES
"  Tommy Lerner is a 60 year old male who is being cared for via a billable virtual visit.      The Tommy Lerner has been notified and verbally consented to the following:     \"This billable virtual visit will be conducted between you and your provider.\"     \"Patient has opted to conduct today's billable virtual visit vs an in-person appointment, and is not able to attend due to possible exposure to COVID-19\"    Video visits are billed at different rates depending on your insurance coverage.  Please reach out to your insurance provider with any questions.    If during the course of the call the provider feels the appointment is not appropriate, you will not be charged for this service.\"     Provider has received verbal consent for billable virtual visit from the patient? Yes    Will anyone else be joining your video visit? This is a joint visit with Dr. Smith, who is seeing the patient in the clinic.    Preferred method for receiving information: email    Video Visit Details:  Call initiated at: 9:33 AM  Call ended at: 10:38  Platform used to conduct today's virtual appointment: Ep[employer]  Location of provider: [employer]  Location of patient: [facility]  Ashish Manzo Jr., M.D., F.A.C.S.  Iva Smith M.D., M.P.H.  Jazmine Lombardi M.D.  Taniya Sweeney, Ph.D., CCC/SLP  Anjali Mckeon M.M. (voice), M.A., CCC/SLP  Timi Mejia M.M. (voice), M.A., CCC/SLP    [facility]  VOICE/SPEECH/BREATHING EVALUATION AND LARYNGEAL EXAMINATION REPORT    Patient: Tommy Lerner  Date of Visit: 10/26/2020    Clinician: Taniya Sweeney, Ph.D., CCC/SLP  Seen in conjunction with: Dr. Smith  Referring Physician: Dr. Kelsi Valencia    CHIEF COMPLAINT:  cough    HISTORY  Tommy Lerner is a 60 year old presenting today for evaluation of cough and dysphonia.    Please refer to Dr. Smith s dictation for a more complete history and impressions.   Salient details of his history are as " follows:    5 year Hx chronic cough, worsening over past 3-4 months    Now the cough feels pulmonary, and he feels tightness in the chest, and as if something is in his lungs    This does not interfere with high-level athleticism    His breathing has been okay until recently, but now does not feel good    A recent chest CT shows some opacities in his lungs     He has not had a full pulmonary work-up, but it is scheduled for a week from now    Using a Ventolin inhaler helps reduce the cough, but also makes the cough feel more productive     The productivity of the cough is continually increasing    The cough makes him feel that he may be aspirating something in his throat; this is worse with his own secretions    The cough is worse with dairy products    Proton pump inhibitors were no help with the cough    Reflux precautions don't change the cough much, though he does note some dietary triggers, as well as eating close to bedtime    Once or twice a week he gets a cough episode that results in nausea and gagging is    He does not have a day without coughing    Experiences vocal fatigue and increasing raspiness of his voice quality as he continues to talk    Extensive voice use makes his cough worse; this happens even after an hour of talking     Heavy vocal demand in his job    DIAGNOSIS/REASON FOR REFERRAL  Dysphonia/ Evaluate, perform laryngeal exam, treat as appropriate    CURRENT SYMPTOMS INCLUDE:  VOICE    Increasing discomfort/effort and raspiness as he talks; Sxs come on in an hour of talking  COUGH/THROAT CLEARING    Frequent dry cough or throat-clear, not sharp but not productive    his cough/ throat clearing triggers include:           Voice use, Reflux (though cough is not increased at night or when lying down)  SWALLOWING    Some effort with swallowing pills; no apparent aspiration  ADDITIONAL    Breathing problems described above    Denies significant dyspnea, dysphagia, or pain    OTHER PERTINENT  HISTORY    Unknown; please refer to Dr. Smith's note and elsewhere in this chart    VOICE/ SPEECH/ NON-COMMUNICATIVE LARYNGEAL BEHAVIORS EVALUATION  An evaluation of the voice and cough was accomplished today; salient features are as follows:    Palpation of the laryngeal area shows:    Tightness of the thyrohyoid area, and tenderness to palpation    Cough/ Throat clear:    Occasional    Dry, but messy    Locus of cough/ throat clear: sounds consistent with upper airway    Mild in severity    When Dr. Smith models a gentle throat clear, he responds with a very heavy throat clear    Breathing pattern:    Apparent tight abdominal musculature, with minimal rib cage or abdominal movement for breathing    No overt tension is evident.    Voice quality is characterized by    Within normal limits quality    Slight narrow resonance, though well within normal limits    No glottal arrington; no roughness, breathiness, or strain    Habitual pitch is within normal limits at B2 to D3, with appropriate use of pitch range    Loudness is WNL and appropriate for the setting    Sustained vowels: All within normal limits    A singing task shows voice quality is consistent between speech and singing, with good quality in the range from Ab2 to Ab3    In a pitch glide task to determine pitch range, he shows normal pitch range, with good ability to access falsetto register    he states today his voice is a little raspy, but it is more of a feeling than a sound    When it gets more raspy, it is more noticeable, with an extra sound along with his regular tone quality    LARYNGEAL EXAMINATION    Endoscopic laryngeal examination was performed by:  Iva Smith MD,   I provided the protocol of instructions for the patient.  Type of exam:   Flexible endoscopy with chip-tip technology and stroboscopy  This exam shows:  Laryngeal and Vocal Fold Mucosa    Essentially healthy laryngeal mucosa    Moderate presence of sticky, thickened secretions  throughout the laryngeal area    Status of vocal fold mucosa:   o mildly and diffusely edematous or thickened; L>R   o Otherwise within normal limits, with no lesions and essentially straight vibratory margins    Neurological and Functional Integrity of the Larynx    Vocal folds are mobile and meet at midline; movement is brisk and symmetric; exam is neurologically normal     Airway is normal    Normal function is evident during a task of 20 quickly repeated vowels    Elongation of the vocal folds for pitch increase is good    On phonation, glottic closure is complete; no glottic gap noted    Supraglottic Function and Therapy Probes    Inconsistent mild to moderate anterior-posterior constriction of the supraglottic larynx during connected speech  o This increases as speech continues    Stroboscopy provided the following additional information:    Stroboscopy shows a normal mucosal wave at habitual pitch, but out-of-phase mucosal wave at higher and lower pitch    Dr. Smith and I reviewed this laryngeal exam with Mr. Lerner today, and I provided pertinent explanations:    Endoscopic findings are consistent with audio-perceptual assessment and patient Hx/complaints.    his symptoms are accounted for by mild mucosal swelling, apparent mucosa irritation with secretion collection, and muscular hyperfunction that increases as he speaks     Because there appears to be a functional component to his symptoms, he would most likely benefit from functional speech therapy.    Dr. Smith has also recommended a swallow study because of the opacities found in the chest CT    ASSESSMENT / PLAN  IMPRESSIONS:  This evaluation has resulted in the following diagnosis/diagnoses for Mr. Lerner  R05 (Chronic Cough)  R49.0 (Dysphonia)  J38.7 (Irritable Larynx Syndrome).      Laryngeal evaluation demonstrated both mucosal and muscular findings    The thickening of the vocal fold mucosa is likely due to chronic cough, but results in  increasing dysphonia and vocal fatigue, as well as hyperfunction and irritation that leads to more cough    Perceptual evaluation demonstrated WNL voice quality and mild occasional cough, at a time when he is very mildly symptomatic    RECOMMENDATIONS:     A course of speech therapy is recommended to optimize vocal technique, improve voice quality, promote reduced discomfort, effort and fatigue and help reduce chronic cough, throat clear, mucosal irritation and breathing problems.    He demonstrates a Good prognosis for improvement given adherence to therapeutic recommendations. Therapeutic     Positive indicators: commitment to process; diagnosis is known to respond to functional treatment;     Negative indicators: none      A swallow study has also been ordered, and we encourage the upcoming Pulmonary work-up, as well as possible GI work-up    TREATMENT PLAN  Speech therapy    DURATION/FREQUENCY OF TREATMENT  Six weekly or bi-weekly one-hour sessions, with four monthly one-hour follow-up sessions    GOALS  Patient goal:    To have a normal and acceptable voice quality, comfort, and stamina for all his voice needs  To reduce his cough and throat-clear to acceptable levels  To breathe normally and comfortably in all situations    Long-term goal(s): In 6 months, Mr. Lerner will:  1.  Report a week of typical vocal activities, in which dysphonia and vocal fatigue do not exceed a level of 2 out of 10, 80% of the time   2.  Report a week of typical activities, in which sensaton of dyspnea does not exceed a level of 2 out of 10, 80% of the time  3.  Report a week with no more than two episodes of coughing or throat-clearing per day, that do not last more than two seconds    This treatment plan was developed with the patient who agreed with the recommendations.    TOTAL SERVICE TIME: 65 minutes  EVALUATION OF VOICE AND RESONANCE (09234)  NO CHARGE FACILITY FEE       Taniya Sweeney, Ph.D., CCC-SLP  Speech-Language  Pathologist  Director, [facility]  [phone]

## 2020-11-02 ENCOUNTER — PRE VISIT (OUTPATIENT)
Dept: PULMONOLOGY | Facility: CLINIC | Age: 60
End: 2020-11-02

## 2020-11-02 ENCOUNTER — OFFICE VISIT (OUTPATIENT)
Dept: PULMONOLOGY | Facility: CLINIC | Age: 60
End: 2020-11-02
Attending: FAMILY MEDICINE
Payer: COMMERCIAL

## 2020-11-02 VITALS
RESPIRATION RATE: 18 BRPM | OXYGEN SATURATION: 98 % | SYSTOLIC BLOOD PRESSURE: 149 MMHG | WEIGHT: 168 LBS | DIASTOLIC BLOOD PRESSURE: 95 MMHG | BODY MASS INDEX: 23.43 KG/M2 | HEART RATE: 66 BPM

## 2020-11-02 DIAGNOSIS — R06.00 DYSPNEA: Primary | ICD-10-CM

## 2020-11-02 DIAGNOSIS — R93.89 ABNORMAL CXR: Primary | ICD-10-CM

## 2020-11-02 DIAGNOSIS — R05.3 PERSISTENT COUGH: ICD-10-CM

## 2020-11-02 DIAGNOSIS — R06.00 DYSPNEA: ICD-10-CM

## 2020-11-02 LAB — PULMONARY FUNCTION TEST-FENO: 33 PPB (ref 0–40)

## 2020-11-02 PROCEDURE — 99207 PR NO CHARGE LOS: CPT

## 2020-11-02 PROCEDURE — G0463 HOSPITAL OUTPT CLINIC VISIT: HCPCS | Mod: 25

## 2020-11-02 PROCEDURE — 94375 RESPIRATORY FLOW VOLUME LOOP: CPT | Performed by: INTERNAL MEDICINE

## 2020-11-02 PROCEDURE — 94729 DIFFUSING CAPACITY: CPT | Performed by: INTERNAL MEDICINE

## 2020-11-02 PROCEDURE — 94726 PLETHYSMOGRAPHY LUNG VOLUMES: CPT | Performed by: INTERNAL MEDICINE

## 2020-11-02 PROCEDURE — 99205 OFFICE O/P NEW HI 60 MIN: CPT | Mod: 25 | Performed by: INTERNAL MEDICINE

## 2020-11-02 ASSESSMENT — PAIN SCALES - GENERAL: PAINLEVEL: NO PAIN (0)

## 2020-11-02 NOTE — PROGRESS NOTES
"  Pulmonary Clinic     Patient Name: Tommy Lerner  YOB: 1960  MRN: [id number]    Date of Service: November 2, 2020  Chief Complaint: chronic cough          HISTORY OF PRESENT ILLNESS:      Mr Lerner is a 61 YO M with h/o WALDEMAR on CPAP who presents for evaluation of chronic cough.    His cough has been present for 5 years and has progressed in frequency over the past several months. It is typically a dry cough, but at times can be productive with clear sputum especially after use of his Albuterol inhaler. Denies hemoptysis, dyspnea, wheeze, presyncope, paroxysmal nocturnal dyspnea, orthopnea, peripheral edema, or changes in weight. He notes a mild, intermittent \"stinging\" in his chest b/l anteriorly that is not reproducible or associated with eating, digestion, or activity. In addition he complains of a worsening raspy voice and trouble swallowing pills, describing this problem as if he \"has a frog in his throat.\" He denies trouble swallow food or liquids. He notes post-nasal drip in the mornings which he does not take any medications to manage. Denies reflux symptoms. He has a h/o seasonal allergies notable for itchy and watery eyes. Denies headaches or sinus pressure. Patient has been compliant with is CPAP.    He was initially worked up for his chronic cough by his PCP. He trialed a PPI for 2 months for potential GERD, however this did not alleviate his symptoms and he stopped taking this 1 month ago. He also tried ~ 1 month of Flonase but stopped this as well. He has had an Albuterol inhaler for several years which he rarely used until ~ 6 months ago. Last week he used it ~3-4x/day but this week has had to use it 0-1x/day. He thinks Albuterol may help his breathing and cough.    On 10/26/20 he was evaluated by speech language pathology an ENT. He had a flexible fiberoptic laryngoscopy and laryngovideostroboscopy, which showed laryngeal hyperreactivity. He will continue to receive speech " narcisa, and has an upcoming XR esophogram and swallow study on 11/6/20 and will f/up with ENT in 3-4 months.     Patient is an avid long-distance biker and cross-country skier.     His daughter had COVID-19 infection recently, but he has had no contact with her since her diagnosis.         PAST MEDICAL HISTORY:     Past Medical History:   Diagnosis Date     Chronic cough           PAST SURGICAL HISTORY:     Past Surgical History:   Procedure Laterality Date     vasectomy            ALLERGIES:    No Known Allergies         HOME MEDICATIONS:     Current Outpatient Medications   Medication     amoxicillin-clavulanate (AUGMENTIN) 875-125 MG tablet     albuterol (VENTOLIN HFA) 108 (90 Base) MCG/ACT inhaler     Cholecalciferol (VITAMIN D3) 50 MCG (2000 UT) CAPS     MAGNESIUM PO     Omega-3 Fatty Acids (FISH OIL PO)     pantoprazole (PROTONIX) 40 MG EC tablet     sildenafil (VIAGRA) 100 MG tablet     traZODone (DESYREL) 50 MG tablet     No current facility-administered medications for this visit.           FAMILY HISTORY:     Family History   Problem Relation Age of Onset     Pancreatic Cancer Maternal Grandmother      Hyperlipidemia Father      Other - See Comments Father         idiopathic pulmonary fibrosis     Idiopathic pulmonary fibrosis Father      Other - See Comments Other         Etoh dependence, dad and brother     Diabetes Other         m unc, type I     Other - See Comments Mother 80        memory loss     Sleep Apnea Mother           SOCIAL HISTORY:     Social History     Tobacco Use     Smoking status: Never Smoker     Smokeless tobacco: Former User     Types: Chew   Substance Use Topics     Alcohol use: Yes     Alcohol/week: 0.0 standard drinks     Comment: mostly on weekends, 1-2 drinks     Drug use: No     Originally from [place], moved to [place] for college and stayed    Occupation: , 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    Pets: used to have dog (no allergies to pets)  Home: live in apartment, used to live in house that was >100 years old (unknown if inhalational exposures in prior home)    Relationships: going through divorce         REVIEW OF SYSTEMS:     10 point ROS was negative except as noted in HPI         PHYSICAL EXAM:   Vitals: BP (!) 149/95   Pulse 66   Resp 18   Wt 76.2 kg (168 lb)   SpO2 98%   BMI 23.43 kg/m      Wt Readings from Last 4 Encounters:   11/02/20 76.2 kg (168 lb)   10/26/20 76.2 kg (168 lb)   08/25/20 76.2 kg (168 lb)   08/07/20 75.8 kg (167 lb)     GENERAL: pleasant  man, interactive, in NAD  HEENT: NC, AT, no conjunctivitis, anicteric sclera, no cervical LAD, no oropharyngeal lesions or erythema  LUNGS: CTAB, no crackles or wheezes, speaking in full sentences, on RA  HEART: RRR, normal S1 and S2, no m/r/g   ABDOMEN: Soft, nontender, nondistended  MUSCULOSKELETAL: no chest wall tenderness, no deformities  EXTREMITIES: No LE edema bilaterally  SKIN: Warm and dry  NEURO: CN II-XII intact, normal gait  PSYCH: A&Ox4, appropriate affect and mentation          DATA:     Laboratory Data:  - Reviewed recent CBC with diff and TSH: WNL    11/2/20 PFT  - Adequate study per ATS criteria  - Spirometry, lung volumes, and DLCO all within normal limits    Imaging:  10/16/20 CXR  Subtle left basilar airspace opacities which may represent atelectasis  versus aspiration or infection.    09/09/20 CT AP  Minimal basilar atelectasis            ASSESSMENT & PLAN:     Mr Lerner is a 59 YO M with h/o WALDEMAR on CPAP who presents for evaluation of progressive chronic cough in setting of dysphonia and dysphagia. No improvement with course of PPI and brief trial of Flonase without improvement. Patient currently seeing speech pathology and ENT will have continued speech therapy sessions and ENT f/up. PFT's today without evidence of obstruction however patient notes improvement of cough with use of Albuterol.  Discussed moderate pre-test probability of reactive airway disease and suggested methacholine challenge vs empiric trial of ICS.  After further discussion Mr. Lerner would prefer to pursue methacholine challenge testing for definitive diagnosis but is aware this will need to be in the future once PFT lab is performing these tests again (currently not happening d/t COVID-19 pandemic). Also suggested continued use of Flonase and use of OTC anti-histamine for PND. We also discussed his prior CXR showing atelectasis vs infection vs aspiration at the L lung base.  Notably he had a CT AP from September that showed basilar atelectasis only.  Still, I think it would be reasonable to treat with a short course of antibiotics to cover for any potential infection.  The patient agreed to start a 1 week trial of Augmentin. We will recheck a CXR 1 month s/p treatment to assess for resolution.  If findings are persistent, anticipate obtaining CT Chest for further characterization.    # Abnormal CXR  # Persistent cough       - Continue use of Albuterol. Restart Flonase.       - Agree with continued speech therapy.       -  Start Amoxicillin-clavulanate (AUGMENTIN) 875-125 MG tablet; Take 1 tablet by mouth 2 times daily for 7 days for treatment of possible aspiration pneumonia. Patient will reach out to clinic if he develops uncontrolled diarrhea.        -     General PFT Lab (Please always keep checked); Future  -     X-ray Chest 2 vws*; Future  -     Exhaled Nitric Oxide - FENO; Future  -     Pulmonary Function Test; Future    #Healthcare Maintenance  - Patient has received the flu shot this year.    Return to clinic: 3 months with PFT's    Patient care plan discussed with attending physician, Dr. Sow, who agreed with above.     Hammad Gonzales Jr, MD  Internal Medicine, PGY-2  [phone]    Physician Attestation   I, Sharon Sow MD, saw and discussed this patient with the resident/fellow.  I agree with the  "resident/fellow findings and plan of care as documented in their note. I have personally reviewed today's vital signs, medications, laboratory results and imaging results.  I have personally edited this note to reflect our joint medical decision making.    FeNO = 33 which is technically normal but in the \"indeterminant\" range for predicting asthma.    Sharon Sow  Date of Service (when I saw the patient): 11/02/20      "

## 2020-11-02 NOTE — LETTER
"    11/2/2020         RE: Tommy Lerner  [address]  [address]  [place] [address]        Dear Colleague,    Thank you for referring your patient, Tommy Lerner, to the [facility] AND [facility] [place]. Please see a copy of my visit note below.      Pulmonary Clinic     Patient Name: Tommy Lerner  YOB: 1960  MRN: [id number]    Date of Service: November 2, 2020  Chief Complaint: chronic cough          HISTORY OF PRESENT ILLNESS:      Mr Lerner is a 61 YO M with h/o WALDEMAR on CPAP who presents for evaluation of chronic cough.    His cough has been present for 5 years and has progressed in frequency over the past several months. It is typically a dry cough, but at times can be productive with clear sputum especially after use of his Albuterol inhaler. Denies hemoptysis, dyspnea, wheeze, presyncope, paroxysmal nocturnal dyspnea, orthopnea, peripheral edema, or changes in weight. He notes a mild, intermittent \"stinging\" in his chest b/l anteriorly that is not reproducible or associated with eating, digestion, or activity. In addition he complains of a worsening raspy voice and trouble swallowing pills, describing this problem as if he \"has a frog in his throat.\" He denies trouble swallow food or liquids. He notes post-nasal drip in the mornings which he does not take any medications to manage. Denies reflux symptoms. He has a h/o seasonal allergies notable for itchy and watery eyes. Denies headaches or sinus pressure. Patient has been compliant with is CPAP.    He was initially worked up for his chronic cough by his PCP. He trialed a PPI for 2 months for potential GERD, however this did not alleviate his symptoms and he stopped taking this 1 month ago. He also tried ~ 1 month of Flonase but stopped this as well. He has had an Albuterol inhaler for several years which he rarely used until ~ 6 months ago. Last week he used it ~3-4x/day but this week has " had to use it 0-1x/day. He thinks Albuterol may help his breathing and cough.    On 10/26/20 he was evaluated by speech language pathology an ENT. He had a flexible fiberoptic laryngoscopy and laryngovideostroboscopy, which showed laryngeal hyperreactivity. He will continue to receive speech threrapy, and has an upcoming XR esophogram and swallow study on 11/6/20 and will f/up with ENT in 3-4 months.     Patient is an avid long-distance biker and cross-country skier.     His daughter had COVID-19 infection recently, but he has had no contact with her since her diagnosis.         PAST MEDICAL HISTORY:     Past Medical History:   Diagnosis Date     Chronic cough           PAST SURGICAL HISTORY:     Past Surgical History:   Procedure Laterality Date     vasectomy            ALLERGIES:    No Known Allergies         HOME MEDICATIONS:     Current Outpatient Medications   Medication     amoxicillin-clavulanate (AUGMENTIN) 875-125 MG tablet     albuterol (VENTOLIN HFA) 108 (90 Base) MCG/ACT inhaler     Cholecalciferol (VITAMIN D3) 50 MCG (2000 UT) CAPS     MAGNESIUM PO     Omega-3 Fatty Acids (FISH OIL PO)     pantoprazole (PROTONIX) 40 MG EC tablet     sildenafil (VIAGRA) 100 MG tablet     traZODone (DESYREL) 50 MG tablet     No current facility-administered medications for this visit.           FAMILY HISTORY:     Family History   Problem Relation Age of Onset     Pancreatic Cancer Maternal Grandmother      Hyperlipidemia Father      Other - See Comments Father         idiopathic pulmonary fibrosis     Idiopathic pulmonary fibrosis Father      Other - See Comments Other         Etoh dependence, dad and brother     Diabetes Other         m unc, type I     Other - See Comments Mother 80        memory loss     Sleep Apnea Mother           SOCIAL HISTORY:     Social History     Tobacco Use     Smoking status: Never Smoker     Smokeless tobacco: Former User     Types: Chew   Substance Use Topics     Alcohol use: Yes      Alcohol/week: 0.0 standard drinks     Comment: mostly on weekends, 1-2 drinks     Drug use: No     Originally from [place], moved to [place] for college and stayed    Occupation: , 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    Pets: used to have dog (no allergies to pets)  Home: live in apartment, used to live in house that was >100 years old (unknown if inhalational exposures in prior home)    Relationships: going through divorce         REVIEW OF SYSTEMS:     10 point ROS was negative except as noted in HPI         PHYSICAL EXAM:   Vitals: BP (!) 149/95   Pulse 66   Resp 18   Wt 76.2 kg (168 lb)   SpO2 98%   BMI 23.43 kg/m      Wt Readings from Last 4 Encounters:   11/02/20 76.2 kg (168 lb)   10/26/20 76.2 kg (168 lb)   08/25/20 76.2 kg (168 lb)   08/07/20 75.8 kg (167 lb)     GENERAL: pleasant  man, interactive, in NAD  HEENT: NC, AT, no conjunctivitis, anicteric sclera, no cervical LAD, no oropharyngeal lesions or erythema  LUNGS: CTAB, no crackles or wheezes, speaking in full sentences, on RA  HEART: RRR, normal S1 and S2, no m/r/g   ABDOMEN: Soft, nontender, nondistended  MUSCULOSKELETAL: no chest wall tenderness, no deformities  EXTREMITIES: No LE edema bilaterally  SKIN: Warm and dry  NEURO: CN II-XII intact, normal gait  PSYCH: A&Ox4, appropriate affect and mentation          DATA:     Laboratory Data:  - Reviewed recent CBC with diff and TSH: WNL    11/2/20 PFT  - Adequate study per ATS criteria  - Spirometry, lung volumes, and DLCO all within normal limits    Imaging:  10/16/20 CXR  Subtle left basilar airspace opacities which may represent atelectasis  versus aspiration or infection.    09/09/20 CT AP  Minimal basilar atelectasis            ASSESSMENT & PLAN:     Mr Lerner is a 59 YO M with h/o WALDEMAR on CPAP who presents for evaluation of progressive chronic cough in setting of dysphonia and dysphagia. No  improvement with course of PPI and brief trial of Flonase without improvement. Patient currently seeing speech pathology and ENT will have continued speech therapy sessions and ENT f/up. PFT's today without evidence of obstruction however patient notes improvement of cough with use of Albuterol. Discussed moderate pre-test probability of reactive airway disease and suggested methacholine challenge vs empiric trial of ICS.  After further discussion Mr. Lerner would prefer to pursue methacholine challenge testing for definitive diagnosis but is aware this will need to be in the future once PFT lab is performing these tests again (currently not happening d/t COVID-19 pandemic). Also suggested continued use of Flonase and use of OTC anti-histamine for PND. We also discussed his prior CXR showing atelectasis vs infection vs aspiration at the L lung base.  Notably he had a CT AP from September that showed basilar atelectasis only.  Still, I think it would be reasonable to treat with a short course of antibiotics to cover for any potential infection.  The patient agreed to start a 1 week trial of Augmentin. We will recheck a CXR 1 month s/p treatment to assess for resolution.  If findings are persistent, anticipate obtaining CT Chest for further characterization.    # Abnormal CXR  # Persistent cough       - Continue use of Albuterol. Restart Flonase.       - Agree with continued speech therapy.       -  Start Amoxicillin-clavulanate (AUGMENTIN) 875-125 MG tablet; Take 1 tablet by mouth 2 times daily for 7 days for treatment of possible aspiration pneumonia. Patient will reach out to clinic if he develops uncontrolled diarrhea.        -     General PFT Lab (Please always keep checked); Future  -     X-ray Chest 2 vws*; Future  -     Exhaled Nitric Oxide - FENO; Future  -     Pulmonary Function Test; Future    #Healthcare Maintenance  - Patient has received the flu shot this year.    Return to clinic: 3 months with  "PFT's    Patient care plan discussed with attending physician, Dr. Sow, who agreed with above.     Hammad Gonzales Jr, MD  Internal Medicine, PGY-2  [phone]    Physician Attestation   I, Sharon Sow MD, saw and discussed this patient with the resident/fellow.  I agree with the resident/fellow findings and plan of care as documented in their note. I have personally reviewed today's vital signs, medications, laboratory results and imaging results.  I have personally edited this note to reflect our joint medical decision making.    FeNO = 33 which is technically normal but in the \"indeterminant\" range for predicting asthma.    Sharon Sow  Date of Service (when I saw the patient): 11/02/20        "

## 2020-11-02 NOTE — NURSING NOTE
Chief Complaint   Patient presents with     Consult     Cough      Medications reviewed and updated.  Vitals taken  Nurys Mercado CMA

## 2020-11-03 LAB
DLCOUNC-%PRED-PRE: 121 %
DLCOUNC-PRE: 34.38 ML/MIN/MMHG
DLCOUNC-PRED: 28.31 ML/MIN/MMHG
ERV-%PRED-PRE: 92 %
ERV-PRE: 1.39 L
ERV-PRED: 1.51 L
EXPTIME-PRE: 6.82 SEC
FEF2575-%PRED-PRE: 104 %
FEF2575-PRE: 3.14 L/SEC
FEF2575-PRED: 3 L/SEC
FEFMAX-%PRED-PRE: 99 %
FEFMAX-PRE: 9.37 L/SEC
FEFMAX-PRED: 9.38 L/SEC
FEV1-%PRED-PRE: 92 %
FEV1-PRE: 3.4 L
FEV1FEV6-PRE: 81 %
FEV1FEV6-PRED: 79 %
FEV1FVC-PRE: 81 %
FEV1FVC-PRED: 77 %
FEV1SVC-PRE: 75 %
FEV1SVC-PRED: 71 %
FIFMAX-PRE: 5.21 L/SEC
FRCPLETH-%PRED-PRE: 96 %
FRCPLETH-PRE: 3.53 L
FRCPLETH-PRED: 3.68 L
FVC-%PRED-PRE: 88 %
FVC-PRE: 4.21 L
FVC-PRED: 4.76 L
IC-%PRED-PRE: 86 %
IC-PRE: 3.15 L
IC-PRED: 3.64 L
RVPLETH-%PRED-PRE: 86 %
RVPLETH-PRE: 2.14 L
RVPLETH-PRED: 2.47 L
TLCPLETH-%PRED-PRE: 91 %
TLCPLETH-PRE: 6.68 L
TLCPLETH-PRED: 7.33 L
VA-%PRED-PRE: 88 %
VA-PRE: 6 L
VC-%PRED-PRE: 88 %
VC-PRE: 4.54 L
VC-PRED: 5.14 L

## 2020-11-06 ENCOUNTER — THERAPY VISIT (OUTPATIENT)
Dept: SPEECH THERAPY | Facility: CLINIC | Age: 60
End: 2020-11-06
Attending: OTOLARYNGOLOGY
Payer: COMMERCIAL

## 2020-11-06 ENCOUNTER — ANCILLARY PROCEDURE (OUTPATIENT)
Dept: GENERAL RADIOLOGY | Facility: CLINIC | Age: 60
End: 2020-11-06
Attending: OTOLARYNGOLOGY
Payer: COMMERCIAL

## 2020-11-06 DIAGNOSIS — R13.14 PHARYNGOESOPHAGEAL DYSPHAGIA: Primary | ICD-10-CM

## 2020-11-06 DIAGNOSIS — R49.0 DYSPHONIA: ICD-10-CM

## 2020-11-06 PROCEDURE — 99207 XR ESOPHAGRAM: CPT | Mod: GC | Performed by: RADIOLOGY

## 2020-11-06 PROCEDURE — 92611 MOTION FLUOROSCOPY/SWALLOW: CPT | Mod: GN | Performed by: SPEECH-LANGUAGE PATHOLOGIST

## 2020-11-06 PROCEDURE — 74230 X-RAY XM SWLNG FUNCJ C+: CPT | Mod: GC | Performed by: RADIOLOGY

## 2020-11-06 PROCEDURE — 92610 EVALUATE SWALLOWING FUNCTION: CPT | Mod: GN | Performed by: SPEECH-LANGUAGE PATHOLOGIST

## 2020-11-06 RX ORDER — BARIUM SULFATE 400 MG/ML
50 SUSPENSION ORAL ONCE
Status: COMPLETED | OUTPATIENT
Start: 2020-11-06 | End: 2020-11-06

## 2020-11-06 RX ADMIN — BARIUM SULFATE 50 ML: 400 SUSPENSION ORAL at 13:49

## 2020-11-06 NOTE — PROGRESS NOTES
11/06/20 1300   General Information   Type Of Visit Initial   Start Of Care Date 11/06/20   Referring Physician Dr. Iva Smith   Orders Evaluate And Treat   Orders Comment Video swallow study   Medical Diagnosis Dysphonia   Onset Of Illness/injury Or Date Of Surgery 10/26/20   Precautions/limitations No Known Precautions/limitations   Hearing Adequate for conversation   Pertinent History of Current Problem/OT: Additional Occupational Profile Info Tommy Lerner is a 60-year-old male with a PMH significant for WALDEMAR on CPAP, posterior fossa arachnoid cyst, and throat difficulties including tightness, mucus, throat clearing, and coughing. Patient was recently evaluated by Dr. Smith in the [facility] and referred for a video swallow study with an esophagram. Upon clinical interview today, he reported he spent all morning talking today and he feels as if his throat is tightening. Stated he has a persistent cough and his body is frequently expectorating fluid. He questions if he is aspirating his saliva or other things. Also reported he has something going on with his lungs and he was recently placed on antibiotics by pulmonology. Stated he first noticed swallowing difficulties about a month ago when swallowing a small pill at night; stated the pill frequently seems to catch in his throat. Reported prior to that he noticed changes with his voice and described it as becoming raspy and having a flutter to it; stated he also noticed esophageal difficulties. Reported he coughs both with and without PO intake. Stated the cough with PO intake feels like more of an irritation rather as if something is going down the wrong way. Endorsed he eats all textures of food. Denied feeling of pharyngeal stasis, recent pneumonias, unintentional weight loss, or odynophagia. Stated he does not have any reflux that he knows of but has a family history of it.    Respiratory Status Room air   Prior Level Of Function  Swallowing   Prior Level Of Function Comment Regular textures, thin liquids   General Observations Pt pleasant and cooperative throughout evaluation.    Patient/family Goals To get rid of cough and tightness in throat.    Clinical Swallow Evaluation   Oral Musculature generally intact   Structural Abnormalities none present   Dentition present and adequate   Mucosal Quality adequate   Mandibular Strength and Mobility intact   Oral Labial Strength and Mobility WFL   Lingual Strength and Mobility WFL   Velar Elevation intact   Buccal Strength and Mobility intact   Laryngeal Function Throat clear;Swallow;Voicing initiated   Oral Musculature Comments Pt reported his voice sounds raspy to him. Occasional throat clearing observed throughout conversation. Adequate strength, coordination, and ROM of oral musculature.    VFSS Evaluation   VFSS Additional Documentation Yes   VFSS Eval: Radiology   Radiologist Resident   Views Taken left lateral;A/P   Physical Location of Procedure [facility]   VFSS Eval: Thin Liquid Texture Trial   Mode of Presentation, Thin Liquid cup;fed by clinician   Order of Presentation 1, 2, 3, 6, 9 (AP)   Preparatory Phase WFL   Oral Phase, Thin Liquid WFL   Pharyngeal Phase, Thin Liquid WFL   Rosenbek's Penetration Aspiration Scale: Thin Liquid Trial Results 1 - no aspiration, contrast does not enter airway   Diagnostic Statement No penetration or aspiration. Adequate pharyngeal clearance.    VFSS Eval: Puree Solid Texture Trial   Mode of Presentation, Puree spoon;self-fed   Order of Presentation 4, 8 (AP)   Preparatory Phase WFL   Oral Phase, Puree WFL   Pharyngeal Phase, Puree WFL   Rosenbek's Penetration Aspiration Scale: Puree Food Trial Results 1 - no aspiration, contrast does not enter airway   Diagnostic Statement No penetration or aspiration. Adequate pharyngeal clearance.    VFSS Eval: Solid Food Texture Trial   Mode of Presentation, Solid self-fed   Order of Presentation 5, 7  (barium tablet with water)   Preparatory Phase WFL   Oral Phase, Solid WFL   Pharyngeal Phase, Solid WFL;Pharyngeal wall coating;other (see comments)  (small particle of residue on PPW that cleared independently)   Rosenbek's Penetration Aspiration Scale: Solid Food Trial Results 1 - no aspiration, contrast does not enter airway   Diagnostic Statement No penetration or aspiration. x1 minimal amount of residue on posterior pharyngeal wall after swallow of solid texture; this was cleared with a spontaneous subsequent dry swallow.    Swallow Compensations   Swallow Compensations No compensations were used   Results No difficulties noted   Educational Assessment   Barriers to Learning No barriers   Esophageal Phase of Swallow   Patient reports or presents with symptoms of esophageal dysphagia Yes   Esophageal sweep performed during today s vidofluoroscopic exam  Yes   Esophageal comments Puree residual noted in esophagus during esophageal sweep that was reduced with liquid wash. Esophagram completed after VFSS; per radiologist, esophagus appeared functional. See radiology report for additional details.    Swallow Eval: Clinical Impressions   Skilled Criteria for Therapy Intervention No problems identified which require skilled intervention   Dysphagia Outcome Severity Scale (KAY) Level 7 - KAY   Treatment Diagnosis Functional oral and pharyngeal swallow.   Diet texture recommendations Regular diet;Thin liquids   Recommended Feeding/Eating Techniques alternate between small bites and sips of food/liquid;hard swallow w/ each bite or sip;maintain upright posture during/after eating for 30 mins;small sips/bites;other (see comments)  (oral cares)   Rehab Potential good, to achieve stated therapy goals   Anticipated Discharge Disposition home   Risks and Benefits of Treatment have been explained. Yes   Patient, family and/or staff in agreement with Plan of Care Yes   Clinical Impression Comments Patient presents with  functional oral and pharyngeal swallow. Oral phase is marked by adequate bolus control and oral clearance. Pharyngeal phase is characterized by prompt swallow response with effective airway protection. No penetration or aspiration present during today's study. Patient exhibits generally adequate pharyngeal clearance of all textures; 1x episode of small amount of residue is noted on posterior pharyngeal wall after swallow of dry cookie coated in barium. Residue is cleared spontaneously with subsequent dry swallow. Barium tablet is swallowed with water without difficulty. Upon cursory scan of esophagus, residuals from puree texture present but these are reduced with use of liquid wash. Esophagram completed after VFSS and, per radiologist, appeared functional; please see radiology report for complete summary. Given results of today's VFSS, patient is at low risk of aspiration. Recommend continue regular textures and thin liquids with use of general swallowing strategies. Suspect patient's dysphagia is likely related to patient's supraglottic hyperfunction as noted by Dr. Smith. Recommend continue voice therapy per Dr. Smith's recommendations. Patient participated in education re: results/recommendations of VFSS and verbalized understanding and agreement with POC. No additional SLP services are warranted for swallowing at this time.     Total Session Time   SLP Eval: oral/pharyngeal swallow function, clinical minutes (39251) 15   SLP Eval: VideoFluoroscopic Swallow function Minutes (34027) 20   Total Evaluation Time 35     Thank you for the referral of Tommy Lerner. If you have any questions about this report, please contact me using the information below.     Caroline Olivia MA, CCC-SLP  Speech-Language Pathologist   [facility]  Department of Otolaryngology/D&T - 4th floor  Pager: [phone]  Phone: [phone]  Email: [email or web address]

## 2020-12-01 ENCOUNTER — ANCILLARY PROCEDURE (OUTPATIENT)
Dept: GENERAL RADIOLOGY | Facility: CLINIC | Age: 60
End: 2020-12-01
Payer: COMMERCIAL

## 2020-12-01 DIAGNOSIS — R93.89 ABNORMAL CXR: ICD-10-CM

## 2020-12-01 DIAGNOSIS — R05.3 PERSISTENT COUGH: ICD-10-CM

## 2020-12-01 PROCEDURE — 71046 X-RAY EXAM CHEST 2 VIEWS: CPT | Mod: GC | Performed by: RADIOLOGY

## 2020-12-03 ENCOUNTER — TELEPHONE (OUTPATIENT)
Facility: CLINIC | Age: 60
End: 2020-12-03

## 2020-12-03 DIAGNOSIS — R05.9 COUGH: ICD-10-CM

## 2020-12-03 DIAGNOSIS — R93.89 ABNORMAL CXR: Primary | ICD-10-CM

## 2020-12-03 NOTE — TELEPHONE ENCOUNTER
Spoke with patient and he was given cxr results and understands to call imaging (given number) to schedule chest ct per Dr Sow.

## 2020-12-03 NOTE — TELEPHONE ENCOUNTER
CXR shows persistence of the left basilar opacity despite treatment with empiric course of antibiotics.  I'm recommending CT Chest for further evaluation.

## 2020-12-04 ENCOUNTER — ANCILLARY PROCEDURE (OUTPATIENT)
Dept: CT IMAGING | Facility: CLINIC | Age: 60
End: 2020-12-04
Attending: INTERNAL MEDICINE
Payer: COMMERCIAL

## 2020-12-04 DIAGNOSIS — R05.9 COUGH: ICD-10-CM

## 2020-12-04 DIAGNOSIS — R93.89 ABNORMAL CXR: ICD-10-CM

## 2020-12-04 PROCEDURE — 71250 CT THORAX DX C-: CPT | Mod: GC | Performed by: RADIOLOGY

## 2020-12-21 DIAGNOSIS — G47.00 INSOMNIA, UNSPECIFIED TYPE: ICD-10-CM

## 2020-12-21 RX ORDER — TRAZODONE HYDROCHLORIDE 50 MG/1
50 TABLET, FILM COATED ORAL AT BEDTIME
Qty: 30 TABLET | Refills: 4 | Status: SHIPPED | OUTPATIENT
Start: 2020-12-21 | End: 2021-03-20

## 2020-12-31 ENCOUNTER — VIRTUAL VISIT (OUTPATIENT)
Dept: FAMILY MEDICINE | Facility: OTHER | Age: 60
End: 2020-12-31
Payer: COMMERCIAL

## 2020-12-31 DIAGNOSIS — Z20.822 SUSPECTED COVID-19 VIRUS INFECTION: ICD-10-CM

## 2020-12-31 DIAGNOSIS — Z20.822 SUSPECTED 2019 NOVEL CORONAVIRUS INFECTION: Primary | ICD-10-CM

## 2020-12-31 DIAGNOSIS — Z20.822 SUSPECTED COVID-19 VIRUS INFECTION: Primary | ICD-10-CM

## 2020-12-31 PROCEDURE — 99421 OL DIG E/M SVC 5-10 MIN: CPT | Performed by: PHYSICIAN ASSISTANT

## 2020-12-31 PROCEDURE — U0003 INFECTIOUS AGENT DETECTION BY NUCLEIC ACID (DNA OR RNA); SEVERE ACUTE RESPIRATORY SYNDROME CORONAVIRUS 2 (SARS-COV-2) (CORONAVIRUS DISEASE [COVID-19]), AMPLIFIED PROBE TECHNIQUE, MAKING USE OF HIGH THROUGHPUT TECHNOLOGIES AS DESCRIBED BY CMS-2020-01-R: HCPCS | Performed by: FAMILY MEDICINE

## 2020-12-31 NOTE — PROGRESS NOTES
"Date: 2020 09:16:57  Clinician: Binu Poe  Clinician NPI: [id number]  Patient: Tommy Lerner  Patient : 1960  Patient Address: [facility] [address], [place] [address]  Patient Phone: [phone]  Visit Protocol: URI  Patient Summary:  Tommy is a 60 year old ( : 1960 ) male who initiated a [facility] Visit for COVID-19 (Coronavirus) evaluation and screening. When asked the question \"Please sign me up to receive news, health information and promotions from [facility].\", Tommy responded \"Yes\".    When asked when his symptoms started, Tommy reported that he does not have any symptoms.   He denies having recent facial or sinus surgery in the past 60 days and taking antibiotic medication in the past month.    Pertinent COVID-19 (Coronavirus) information  Tommy does not work or volunteer as healthcare worker or a . In the past 14 days, Tommy has not worked or volunteered at a healthcare facility or group living setting.   In the past 14 days, he also has not lived in a congregate living setting.   Tommy has not had a close contact with a laboratory-confirmed COVID-19 patient within the last 14 days.    Tommy has been tested for COVID-19.      Date(s) of his COVID-19 test as reported by the patient (free text): July or August       Result of COVID-19 test as reported by the patient (free text): Negative       Type of test as reported by the patient (free text): Nasal        Pertinent medical history  He has not been told by his provider to avoid NSAIDs.   Tommy does not have diabetes. He denies having immunosuppressive conditions (e.g., chemotherapy, HIV, organ transplant, long-term use of steroids or other immunosuppressive medications, splenectomy). He denies having congestive heart failure and severe COPD. He does not have asthma.   Tommy does not need a return to work/school note.   Tommy does not smoke or use smokeless tobacco.   Additional information as reported by the " patient (free text): I need to visit my mother, who is in hospice care and dying, so I need a covid test.   Weight: 168 lbs    MEDICATIONS: trazodone oral, ALLERGIES: NKDA  Clinician Response:  Dear Tommy,   Based on your exposure to COVID-19 (coronavirus), we would like to test you for this virus.  1. Please call [phone] to schedule your visit. Explain that you were referred by [facility] to have a COVID-19 test. Be ready to share your [facility] visit ID number.  * If you need to schedule in [facility] please call [phone] or for [place] employees please call [phone].   * If you need to schedule in the [place] area please call [phone]. [employer] employees call [phone].   The following will serve as your written order for this COVID Test, ordered by me, for the indication of suspected COVID [Z20.828]: The test will be ordered in [employer], our electronic health record, after you are scheduled. It will show as ordered and authorized by Aiden Staples MD.  Order: COVID-19 (coronavirus) PCR for ASYMPTOMATIC EXPOSURE testing from [facility].   If you know you have had close contact with someone who tested positive, you should be quarantined for 14 days after this exposure. You should stay in quarantine for the14 days even if the covid test is negative, the optimal time to test after exposure is 5-7 days from the exposure  Quarantine means   What should I do?  For safety, it's very important to follow these rules. Do this for 14 days after the date you were last exposed to the virus..  Stay home and away from others. Don't go to school or anywhere else. Generally quarantine means staying home from work but there are some exceptions to this. Please contact your workplace.   No hugging, kissing or shaking hands.  Don't let anyone visit.  Cover your mouth and nose with a mask, tissue or washcloth to avoid spreading germs.  Wash your hands and face often. Use soap and water.  What are the symptoms of COVID-19?  The  most common symptoms are cough, fever and trouble breathing. Less common symptoms include headache, body aches, fatigue (feeling very tired), chills, sore throat, stuffy or runny nose, diarrhea (loose poop), loss of taste or smell, belly pain, and nausea or vomiting (feeling sick to your stomach or throwing up).  After 14 days, if you have still don't have symptoms, you likely don't have this virus.  If you develop symptoms, follow these guidelines.  If you're normally healthy: Please start another [facility] visit to report your symptoms. Go to [email or web address].  If you have a serious health problem (like cancer, heart failure, an organ transplant or kidney disease): Call your specialty clinic. Let them know that you might have COVID-19.  2. When it's time for your COVID test:  Stay at least 6 feet away from others. (If someone will drive you to your test, stay in the backseat, as far away from the  as you can.)  Cover your mouth and nose with a mask, tissue or washcloth.  Go straight to the testing site. Don't make any stops on the way there or back.  Please note  Caregivers in these groups are at risk for severe illness due to COVID-19:  o People 65 years and older  o People who live in a nursing home or long-term care facility  o People with chronic disease (lung, heart, cancer, diabetes, kidney, liver, immunologic)  o People who have a weakened immune system, including those who:  Are in cancer treatment  Take medicine that weakens the immune system, such as corticosteroids  Had a bone marrow or organ transplant  Have an immune deficiency  Have poorly controlled HIV or AIDS  Are obese (body mass index of 40 or higher)  Smoke regularly  Where can I get more information?   [employer] [place] -- About COVID-19: [email or web address]  CDC -- What to Do If You're Sick: www.cdc.gov/coronavirus/2019-ncov/about/steps-when-sick.html  CDC -- Ending Home Isolation:  www.cdc.gov/coronavirus/2019-ncov/hcp/disposition-in-home-patients.html  [facility] -- Caring for Someone: www.cdc.gov/coronavirus/2019-ncov/if-you-are-sick/care-for-someone.html  [facility] -- Interim Guidance for Hospital Discharge to Home: www.[facility].Critical access hospital.[place]/diseases/coronavirus/hcp/hospdischarge.pdf  [facility] clinical trials (COVID-19 research studies): clinicalaffairs.[facility]-clinical-trials  Below are the COVID-19 hotlines at the [place] Department of Health ([facility]). Interpreters are available.  For health questions: Call 499-902-4416 or 1-316.481.2830 (7 a.m. to 7 p.m.)  For questions about schools and childcare: Call 116-715-9192 or 1-164.727.3997 (7 a.m. to 7 p.m.)    Diagnosis: Contact with and (suspected) exposure to other viral communicable diseases  Diagnosis ICD: Z20.828

## 2021-01-02 LAB
SARS-COV-2 RNA SPEC QL NAA+PROBE: ABNORMAL
SPECIMEN SOURCE: ABNORMAL

## 2021-01-24 ENCOUNTER — MYC MEDICAL ADVICE (OUTPATIENT)
Dept: PULMONOLOGY | Facility: CLINIC | Age: 61
End: 2021-01-24

## 2021-02-08 ENCOUNTER — VIRTUAL VISIT (OUTPATIENT)
Dept: PULMONOLOGY | Facility: CLINIC | Age: 61
End: 2021-02-08
Attending: INTERNAL MEDICINE
Payer: COMMERCIAL

## 2021-02-08 DIAGNOSIS — R05.3 CHRONIC COUGH: Primary | ICD-10-CM

## 2021-02-08 DIAGNOSIS — R13.10 DYSPHAGIA, UNSPECIFIED TYPE: ICD-10-CM

## 2021-02-08 DIAGNOSIS — J45.30 MILD PERSISTENT ASTHMA, UNSPECIFIED WHETHER COMPLICATED: ICD-10-CM

## 2021-02-08 PROCEDURE — 99214 OFFICE O/P EST MOD 30 MIN: CPT | Mod: GT | Performed by: INTERNAL MEDICINE

## 2021-02-08 RX ORDER — FAMOTIDINE 10 MG
10 TABLET ORAL 2 TIMES DAILY
COMMUNITY
End: 2021-03-17

## 2021-02-08 NOTE — LETTER
2/8/2021         RE: Tommy Lerner  [address]  [address]  [place] [address]        Dear Colleague,    Thank you for referring your patient, Tommy Lerner, to the [facility]. Please see a copy of my visit note below.    Jaime is a 60 year old who is being evaluated via a billable video visit.      How would you like to obtain your AVS? MyChart  If the video visit is dropped, the invitation should be resent by: Text to cell phone: [phone]  Will anyone else be joining your video visit? No        Video-Visit Details    Type of service:  Video Visit    Total Video time = 22 minutes    Originating Location (pt. Location): Home    Distant Location (provider location):  [facility]     Platform used for Video Visit: Flayr     HPI:    Mr Lerner is a 59 YO M with h/o WALDEMAR on CPAP who presents for follow up of progressive chronic cough in setting of dysphonia and dysphagia. We last visited in early November.  To briefly review, he had previously been tried on PPI and Flonase without improvement.   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  PFT's were normal and Mr. Lerner reported subjective improvement in cough with albuterol.  We discussed the possibility of treating empirically with ICS vs obtaining a methacholine challenge test for more definitive evaluation - we did discuss that methacholine challenge tests were currently not available due to COVID.  FeNO was 33.  Mr. Lerner preferred to wait and reassess symptoms pending methacholine challenge availability.  He did also have a prior CXR showing atelectasis vs infection vs aspiration at the L lung base.  We treated with a short course of Augmentin to cover for any potential infection.  Follow up CXR showed persistent LLL infiltrate prompting CT  chest in early December.  CT chest was negative.  On 12/31 Mr. Lerner tested positive for COVID.      -Still coughing and feels the cough is overall worse.  Most often the cough is dry.  If he uses his inhaler (albuterol) it seems to loosen stuff up and the cough becomes productive.  Phlegm is always clear.  Subjectively albuterol does seem to help symptoms.  -Uses albuterol up to a few times per day and some days does not need to use at all.  -Has started taking Pepcid AC in the last 4-5 days in case there s some reflux/esophageal irritation.  This seems to help but has not solved the problem.  -Swallowing feels impaired.  After he eats, if he drinks a lot of water this seems to improve things.  -If he takes a deep breath he feels an aching sensation in his chest.  -No shortness of breath.  -No fevers.    -Skied 20 km recently and did not notice any decrease in cardiopulmonary exercise capacity.  -Had no COVID symptoms that he was aware of.  Experienced some tightness in his throat for a few days.  This did not worsen his cough at all.    GENERAL: Healthy, alert and no distress  EYES: Eyes grossly normal to inspection.  No discharge or erythema, or obvious scleral/conjunctival abnormalities.  RESP: No audible wheeze, cough, or visible cyanosis.  No visible retractions or increased work of breathing.    SKIN: Visible skin clear. No significant rash, abnormal pigmentation or lesions.  NEURO: Cranial nerves grossly intact.  Mentation and speech appropriate for age.  PSYCH: Mentation appears normal, affect normal/bright, judgement and insight intact, normal speech and appearance well-groomed.    Data:    CT Chest 12/4:  1. No acute airspace disease, particularly at the left base.     2. Scattered small pulmonary nodules as above. No further follow-up is required unless patient is at high risk for lung cancer, in which case low-dose chest CT follow-up in 12 months is optional.     3. Incidental mild colonic  diverticulosis without evidence of diverticulitis.      Assessment and Plan:    Mr Lerner is a 61 YO M with h/o WALDEMAR on CPAP who presents for follow up of progressive chronic cough in setting of dysphonia and dysphagia.      1. Cough; suspected mild persistent asthma. Work up has included PFTs which were normal, essentially normal CT chest (tiny pulm nodules, low risk pt), and indeterminant  FeNO =33. As previously discussed, there is at least moderate pre-test probability that cough is associated with reactive airway disease based on response to albuterol, FeNO and lack of other clear etiology.  Methacholine challenge tests are still being deferred due to COVID pandemic.  Given persistence of symptoms, the next best step at this time would be an empiric trial of low dose ICS, which he is agreeable to.  I have prescribed low dose QVar for him and encouraged him to continue using albuterol as needed.    2. 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  Previously seen and evaluated by ENT.  Defer to their management.    3. Dysphagia.  Previous XR video swallow with mild esophageal dysmotility.  Patient with ongoing symptoms and also ? Of reflux.  Referral placed to GI for further evaluation.    Return to clinic in 3 months.    Up to date on seasonal flu vaccine.    Yasmine Sow MD  Pulmonary and Critical Care Medicine

## 2021-02-08 NOTE — PROGRESS NOTES
Jaime is a 60 year old who is being evaluated via a billable video visit.      How would you like to obtain your AVS? MyChart  If the video visit is dropped, the invitation should be resent by: Text to cell phone: [phone]  Will anyone else be joining your video visit? No        Video-Visit Details    Type of service:  Video Visit    Total Video time = 22 minutes    Originating Location (pt. Location): Home    Distant Location (provider location):  [facility]     Platform used for Video Visit: Perk     HPI:    Mr Lerner is a 59 YO M with h/o WALDEMAR on CPAP who presents for follow up of progressive chronic cough in setting of dysphonia and dysphagia. We last visited in early November.  To briefly review, he had previously been tried on PPI and Flonase without improvement.   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  PFT's were normal and Mr. Lerner reported subjective improvement in cough with albuterol.  We discussed the possibility of treating empirically with ICS vs obtaining a methacholine challenge test for more definitive evaluation - we did discuss that methacholine challenge tests were currently not available due to COVID.  FeNO was 33.  Mr. Lerner preferred to wait and reassess symptoms pending methacholine challenge availability.  He did also have a prior CXR showing atelectasis vs infection vs aspiration at the L lung base.  We treated with a short course of Augmentin to cover for any potential infection.  Follow up CXR showed persistent LLL infiltrate prompting CT chest in early December.  CT chest was negative.  On 12/31 Mr. Lerner tested positive for COVID.      -Still coughing and feels the cough is overall worse.  Most often the cough is dry.  If he uses his inhaler (albuterol) it seems to loosen stuff up and the cough becomes productive.  Phlegm is  always clear.  Subjectively albuterol does seem to help symptoms.  -Uses albuterol up to a few times per day and some days does not need to use at all.  -Has started taking Pepcid AC in the last 4-5 days in case there s some reflux/esophageal irritation.  This seems to help but has not solved the problem.  -Swallowing feels impaired.  After he eats, if he drinks a lot of water this seems to improve things.  -If he takes a deep breath he feels an aching sensation in his chest.  -No shortness of breath.  -No fevers.    -Skied 20 km recently and did not notice any decrease in cardiopulmonary exercise capacity.  -Had no COVID symptoms that he was aware of.  Experienced some tightness in his throat for a few days.  This did not worsen his cough at all.    GENERAL: Healthy, alert and no distress  EYES: Eyes grossly normal to inspection.  No discharge or erythema, or obvious scleral/conjunctival abnormalities.  RESP: No audible wheeze, cough, or visible cyanosis.  No visible retractions or increased work of breathing.    SKIN: Visible skin clear. No significant rash, abnormal pigmentation or lesions.  NEURO: Cranial nerves grossly intact.  Mentation and speech appropriate for age.  PSYCH: Mentation appears normal, affect normal/bright, judgement and insight intact, normal speech and appearance well-groomed.    Data:    CT Chest 12/4:  1. No acute airspace disease, particularly at the left base.     2. Scattered small pulmonary nodules as above. No further follow-up is required unless patient is at high risk for lung cancer, in which case low-dose chest CT follow-up in 12 months is optional.     3. Incidental mild colonic diverticulosis without evidence of diverticulitis.      Assessment and Plan:    Mr Lerner is a 61 YO M with h/o WALDEMAR on CPAP who presents for follow up of progressive chronic cough in setting of dysphonia and dysphagia.      1. Cough; suspected mild persistent asthma. Work up has included PFTs which were  normal, essentially normal CT chest (tiny pulm nodules, low risk pt), and indeterminant  FeNO =33. As previously discussed, there is at least moderate pre-test probability that cough is associated with reactive airway disease based on response to albuterol, FeNO and lack of other clear etiology.  Methacholine challenge tests are still being deferred due to COVID pandemic.  Given persistence of symptoms, the next best step at this time would be an empiric trial of low dose ICS, which he is agreeable to.  I have prescribed low dose QVar for him and encouraged him to continue using albuterol as needed.    2. 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  Previously seen and evaluated by ENT.  Defer to their management.    3. Dysphagia.  Previous XR video swallow with mild esophageal dysmotility.  Patient with ongoing symptoms and also ? Of reflux.  Referral placed to GI for further evaluation.    Return to clinic in 3 months.    Up to date on seasonal flu vaccine.    Yasmine Sow MD  Pulmonary and Critical Care Medicine

## 2021-02-11 ENCOUNTER — TELEPHONE (OUTPATIENT)
Dept: GASTROENTEROLOGY | Facility: CLINIC | Age: 61
End: 2021-02-11

## 2021-02-11 ENCOUNTER — MYC MEDICAL ADVICE (OUTPATIENT)
Dept: PULMONOLOGY | Facility: CLINIC | Age: 61
End: 2021-02-11

## 2021-02-11 DIAGNOSIS — R13.10 PROBLEMS WITH SWALLOWING: ICD-10-CM

## 2021-02-11 DIAGNOSIS — J45.30 MILD PERSISTENT ASTHMA WITHOUT COMPLICATION: ICD-10-CM

## 2021-02-11 DIAGNOSIS — R05.9 COUGH: Primary | ICD-10-CM

## 2021-02-11 RX ORDER — FLUTICASONE PROPIONATE 110 UG/1
1 AEROSOL, METERED RESPIRATORY (INHALATION) 2 TIMES DAILY
Qty: 12 G | Refills: 4 | Status: SHIPPED | OUTPATIENT
Start: 2021-02-11 | End: 2021-04-28

## 2021-02-11 NOTE — TELEPHONE ENCOUNTER
[facility] Call Center    Phone Message    May a detailed message be left on voicemail: yes     Reason for Call: Other: Per pt calling in to set up an appointment with his referral with the DX of Dysphagia and GERD or Reflux. Writer look at guidelines and saw that the first available appointment is not within 30 days.     Please call pt back to discuss. Per pt would prefer to be called on his work phone since he will be at work most of the time .     Action Taken: Message routed to:  [facility]    Travel Screening: Not Applicable

## 2021-02-15 NOTE — TELEPHONE ENCOUNTER
[facility] Call Center    Phone Message    May a detailed message be left on voicemail: yes     Reason for Call: Other: Patient called as he had not heard back from his 2/11/21 message about scheduling.  Therefore, he made appt for 3/23/21 and is on wait list.  All records in [facility].  Thank you!     Action Taken: Message routed to:  [facility]    Travel Screening: Not Applicable

## 2021-02-18 ENCOUNTER — MYC MEDICAL ADVICE (OUTPATIENT)
Dept: PULMONOLOGY | Facility: CLINIC | Age: 61
End: 2021-02-18

## 2021-02-18 DIAGNOSIS — R07.9 CHEST PAIN: Primary | ICD-10-CM

## 2021-02-18 DIAGNOSIS — R07.89 CHEST TIGHTNESS: ICD-10-CM

## 2021-02-18 LAB — D DIMER PPP FEU-MCNC: <0.3 UG/ML FEU (ref 0–0.5)

## 2021-02-18 PROCEDURE — 36415 COLL VENOUS BLD VENIPUNCTURE: CPT | Performed by: PATHOLOGY

## 2021-02-18 PROCEDURE — 85379 FIBRIN DEGRADATION QUANT: CPT | Performed by: PATHOLOGY

## 2021-02-18 NOTE — TELEPHONE ENCOUNTER
Reviewed Intern message with Dr. Sow.  Since pt has chest tightness and recent COVID infection she recommends D-Dimer to eval for PE.  If positive pt will need CT-PE, if negative she will review for next steps. Left message for pt to let him know that I have sent Intern message with recommendations.

## 2021-02-19 DIAGNOSIS — R07.89 CHEST TIGHTNESS: Primary | ICD-10-CM

## 2021-02-24 ENCOUNTER — ANCILLARY PROCEDURE (OUTPATIENT)
Dept: CT IMAGING | Facility: CLINIC | Age: 61
End: 2021-02-24
Attending: INTERNAL MEDICINE
Payer: COMMERCIAL

## 2021-02-24 DIAGNOSIS — R07.89 CHEST TIGHTNESS: ICD-10-CM

## 2021-02-24 PROCEDURE — 71250 CT THORAX DX C-: CPT | Mod: GC | Performed by: RADIOLOGY

## 2021-02-25 NOTE — TELEPHONE ENCOUNTER
REFERRAL INFORMATION:    Referring Provider:  Dr. Sharon Sow     Referring Clinic:  [facility]     Reason for Visit/Diagnosis: Chronic cough, mild persistent asthma, Dysphagia      FUTURE VISIT INFORMATION:    Appointment Date: 3/23/2021    Appointment Time: 9 AM      NOTES STATUS DETAILS   OFFICE NOTE from Referring Provider Internal 2/8/2021 Office visit with Dr. Sow      OFFICE NOTE from Other Specialist Internal 11/6/2020 Therapy visit with BENIGNO Brasher ([facility])     10/26/2020 Office visit with BENIGNO Ramirez ([facility])     10/26/2020 Office visit with Dr. Iva Smith ([facility] ENT)    8/7/2020 Office visit with Dr. Quentin Ash ([facility])    HOSPITAL DISCHARGE SUMMARY/  ED VISITS N/A    OPERATIVE REPORT N/A    MEDICATION LIST Internal         ENDOSCOPY  N/A    COLONOSCOPY N/A    ERCP N/A    EUS N/A    STOOL TESTING N/A    PERTINENT LABS Internal    PATHOLOGY REPORTS (RELATED) N/A    IMAGING (CT, MRI, EGD, MRCP, Small Bowel Follow Through/SBT, MR/CT Enterography) Internal CT Chest: 2/24/2021, 12/4/2020  XR Chest: 12/1/2020, 10/16/2020  XR Video Swallow: 11/6/2020  XR Esophagram: 11/6/2020

## 2021-03-16 DIAGNOSIS — G47.00 INSOMNIA, UNSPECIFIED TYPE: ICD-10-CM

## 2021-03-17 ENCOUNTER — OFFICE VISIT (OUTPATIENT)
Dept: GASTROENTEROLOGY | Facility: CLINIC | Age: 61
End: 2021-03-17
Payer: COMMERCIAL

## 2021-03-17 VITALS
WEIGHT: 166 LBS | OXYGEN SATURATION: 96 % | SYSTOLIC BLOOD PRESSURE: 127 MMHG | HEIGHT: 71 IN | BODY MASS INDEX: 23.24 KG/M2 | HEART RATE: 72 BPM | DIASTOLIC BLOOD PRESSURE: 82 MMHG

## 2021-03-17 DIAGNOSIS — R05.3 CHRONIC COUGH: Primary | ICD-10-CM

## 2021-03-17 DIAGNOSIS — R93.3 ABNORMAL FINDING ON GI TRACT IMAGING: ICD-10-CM

## 2021-03-17 DIAGNOSIS — R13.19 ESOPHAGEAL DYSPHAGIA: ICD-10-CM

## 2021-03-17 PROCEDURE — 99205 OFFICE O/P NEW HI 60 MIN: CPT | Performed by: INTERNAL MEDICINE

## 2021-03-17 ASSESSMENT — PAIN SCALES - GENERAL: PAINLEVEL: NO PAIN (0)

## 2021-03-17 ASSESSMENT — MIFFLIN-ST. JEOR: SCORE: 1580.1

## 2021-03-17 NOTE — NURSING NOTE
"Chief Complaint   Patient presents with     New Patient       Vitals:    03/17/21 1002   BP: 127/82   BP Location: Left arm   Patient Position: Sitting   Cuff Size: Adult Regular   Pulse: 72   SpO2: 96%   Weight: 75.3 kg (166 lb)   Height: 1.803 m (5' 11\")       Body mass index is 23.15 kg/m .      Alfredo Recio LPN                        "

## 2021-03-17 NOTE — PROGRESS NOTES
Gastroenterology Consult   [facility]            Chief Complaint:   Chronic cough, hoarse voice, dysphagia to pills, possible asthma           ASSESSMENT AND RECOMMENDATIONS:   Assessment:  60 year old male with chronic cough over the last few years developing severely worsening voice has dysphagia to pills and some difficulty with occasional stomach discomfort believes he may be lactose sensitive and allergic.  The cough and the hoarseness of his voice is massively impacting his quality of life.  Recent XR esophagram showing mild esophageal dysmotility.  Patient has had a relatively extensive work-up for all of these problems including neurology consultation pulmonary consultation and otolaryngology consultation.     Recommendations:  High resolution esophageal manometry  Upper endoscopy with Bravo 96-hour pH monitoring  Follow-up in clinic in 1 month after above tests             History of Present Illness:   Tommy Lerner is a 60 year old male with a history of chronic cough for the last 2 years with coarsening of his voice.  The cough is productive of clear phlegm.  He will have coughing fits which are ameliorated by rescue inhaler albuterol.  He is recently started a topical steroid inhaler (fluticasone) disease which seems to be helping to some extent as well.  He recently cut dairy out of his diet which he thinks may also be helping him at this point.  In addition he is extremely stressful social life and is going through divorce and this is coming to and and which is also improving his situation in general.  His symptoms are outlined below    1-cough the patient will cough frequently throughout the day he will have coughing jags which will hurt his chest.  He has had pulmonary function studies that show borderline FEV1 over for protected present but they are still in the normal range.  The patient has amelioration of coughing jags with albuterol suggesting possible reactive airway disease.  The patient  has multiple subcentimeter pulmonary nodules which are being followed on CT of the chest.  He is a lifelong non-smoker.  He denies heartburn he denies leola dysphagia to solid food or liquids.  At times he feels pills get stuck in his chest and go down slowly.  He denies a family history of esophageal cancer or lung cancer.  And ear nose and throat physician looked into his larynx and did not see any major abnormalities or any reflux related abnormalities.  He had a trial of Protonix 40 mg twice a day which did not improve his symptoms after 1 month and after this period time it was discontinued.  The patient is currently not on proton pump inhibitors.      2/24/2021  Non contrast Chest CT                                                         IMPRESSION:   1. No focal airspace opacity.  2. Multiple sub-6 mm solid pulmonary nodules are unchanged dating back  to 2020. Consider follow-up low-dose chest CT in 12 months if the  patient is determined to be high risk.  3. Colonic diverticulosis without evidence for acute diverticulitis.  4. Questionable cholelithiasis versus focal adenomyomatosis of the  gallbladder. No evidence for acute cholecystitis.    See the ENT note from October 2020-correct group below is the excerpt from the findings of the   nasal laryngoscopy  Findings: Normal nasopharynx. Normal base of tongue, valleculae, and epiglottis. Vocal fold mobility: right: normal; left: normal. Medial edges of the vocal folds: smooth with mild prominence of the mid vocal fold on the right.   Glissade produced appropriate elongation. There was moderate supraglottic recruitment with connected speech. Mucosa of false vocal folds, aryepiglottic folds, piriform sinuses, and posterior glottis unremarkable. Airway was patent.   Similar findings on NBI.     The addition of stroboscopy allowed evaluation of the mucosal wave.   Amplitude: right: mildly decreased; left: normal. Symmetry: intermittent symmetry. Closure pattern:  complete. Closure plane: at glottic level. Phase distribution: normal.    2-dysphagia the patient has mild dysphagia to pills but seems to be able to eat and drink regularly.  He denies heartburn     Video-swallow and esophagram,   11/6/2020                                       1. Mild esophageal dysmotility with a normal primary stripping wave.  2. No penetration or aspiration with any consistency barium. Please  see the speech pathologist report for further details regarding the  modified barium swallow study portion of the examination.        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  He has seen a neurologist and has had a relatively extensive work-up that has not revealed any more serious concerns.  TSH is normal.    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  He feels is mostly in the upper legs around the muscles in his upper legs.  CK has been normal.  He is not on statin medications.  He denies morning stiffness he denies joint pains.  Not he denies skin lesions although after eating a pizza recently developed hives he is not in general had any chronic skin changes or skin concerns.      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  He did not mention the symptoms to me today.    Brain MRI June 2020  1. Stable arachnoid cyst in the right prepontine and premedullary  cistern with mass effect on the brainstem and 7th and 8th cranial  nerves which is unchanged.  2. No abnormal enhancement of the brain or its coverings. No abnormal  enhancement in the internal auditory canals.    Neurosurgery June  "2020  \". Discussed the unclear relationship between the arachnoid cyst and his symptoms. My impression is that this cyst is not likely to be causing his symptoms. He would like to continue to monitor his symptoms for now. While the cyst does have mass effect on the brainstem and cranial nerves, his cranial nerve exam is normal and his audiogram is largely normal -- and the cochear nerve tends to be a sensitive nerve for mass effect.   If all other possibilities are exhausted, we can readdress the role of surgery for his posterior fossa cyst.  A cyst fenestration could be performed via a small retromastoid craniectomy.  \"            Past Medical History:     Past Medical History:   Diagnosis Date     Chronic cough             Past Surgical History:     Past Surgical History:   Procedure Laterality Date     vasectomy              Previous Endoscopy:   No results found for this or any previous visit.         Social History:     Social History     Socioeconomic History     Marital status:      Spouse name: None     Number of children: 2     Years of education: None     Highest education level: None   Occupational History     Occupation: financial    Social Needs     Financial resource strain: None     Food insecurity     Worry: None     Inability: None     Transportation needs     Medical: None     Non-medical: None   Tobacco Use     Smoking status: Never Smoker     Smokeless tobacco: Former User     Types: Chew   Substance and Sexual Activity     Alcohol use: Yes     Alcohol/week: 0.0 standard drinks     Comment: mostly on weekends, 1-2 drinks     Drug use: No     Sexual activity: Yes     Partners: Female     Comment: vasectomy   Lifestyle     Physical activity     Days per week: None     Minutes per session: None     Stress: None   Relationships     Social connections     Talks on phone: None     Gets together: None     Attends [employer] service: None     Active member of club or organization: None     Attends " meetings of clubs or organizations: None     Relationship status: None     Intimate partner violence     Fear of current or ex partner: None     Emotionally abused: None     Physically abused: None     Forced sexual activity: None   Other Topics Concern     Parent/sibling w/ CABG, MI or angioplasty before 65F 55M? Not Asked   Social History Narrative    ** Merged History Encounter **                 Family History:     Family History   Problem Relation Age of Onset     Pancreatic Cancer Maternal Grandmother      Hyperlipidemia Father      Other - See Comments Father         idiopathic pulmonary fibrosis     Idiopathic pulmonary fibrosis Father      Other - See Comments Other         Etoh dependence, dad and brother     Diabetes Other         m unc, type I     Other - See Comments Mother 80        memory loss     Sleep Apnea Mother      No known history of colorectal cancer, liver disease, or inflammatory bowel disease.         Allergies:   Reviewed and edited as appropriate   No Known Allergies         Medications:     Current Outpatient Medications   Medication Sig Dispense Refill     albuterol (VENTOLIN HFA) 108 (90 Base) MCG/ACT inhaler Inhale 2 puffs into the lungs every 4 hours as needed for shortness of breath / dyspnea or wheezing 1 Inhaler 11     fluticasone (FLOVENT HFA) 110 MCG/ACT inhaler Inhale 1 puff into the lungs 2 times daily 12 g 4     traZODone (DESYREL) 50 MG tablet Take 1 tablet (50 mg) by mouth At Bedtime 30 tablet 4     sildenafil (VIAGRA) 100 MG tablet Take 1 tablet (100 mg) by mouth daily as needed (ED) Take 30min- 4 hrs before intercourse.No use with nitroglycerin, terazosin or doxazosin. (Patient not taking: Reported on 3/15/2021) 30 tablet 5             Review of Systems:     A complete 10 point review of systems was performed and is negative except as noted in the HPI           Physical Exam:   /82 (BP Location: Left arm, Patient Position: Sitting, Cuff Size: Adult Regular)   Pulse  "72   Ht 1.803 m (5' 11\")   Wt 75.3 kg (166 lb)   SpO2 96%   BMI 23.15 kg/m    Wt:   Wt Readings from Last 2 Encounters:   03/17/21 75.3 kg (166 lb)   03/15/21 75.3 kg (166 lb)      Constitutional: cooperative, pleasant, not dyspneic/diaphoretic, no acute distress  Eyes: Sclera anicteric/injected  Respiratory: CTA B  Skin: warm, perfused, no jaundice  Neuro: AAO x 3, No asterixis mild resting tremor grossly intact from a motor perspective, no facial droop or facial flaccidity   Psych: Normal affect, normal thought discourse   MSK: No gross deformities  Gait: normal         Data:         Agapito Mahajan MD  Associate Professor of Medicine  Division of Gastroenterology, Hepatology, and Nutrition  [facility]    "

## 2021-03-17 NOTE — LETTER
3/17/2021         RE: Tommy Lerner  [address]  [address]  [place] [address]        Dear Colleague,    Thank you for referring your patient, Tommy Lerner, to the [facility] [place]. Please see a copy of my visit note below.      Gastroenterology Consult   [facility]          Chief Complaint:   Chronic cough, hoarse voice, dysphagia to pills, possible asthma           ASSESSMENT AND RECOMMENDATIONS:   Assessment:  60 year old male with chronic cough over the last few years developing severely worsening voice has dysphagia to pills and some difficulty with occasional stomach discomfort believes he may be lactose sensitive and allergic.  The cough and the hoarseness of his voice is massively impacting his quality of life.  Recent XR esophagram showing mild esophageal dysmotility.  Patient has had a relatively extensive work-up for all of these problems including neurology consultation pulmonary consultation and otolaryngology consultation.     Recommendations:  High resolution esophageal manometry  Upper endoscopy with Bravo 96-hour pH monitoring  Follow-up in clinic in 1 month after above tests             History of Present Illness:   Tommy Lerner is a 60 year old male with a history of chronic cough for the last 2 years with coarsening of his voice.  The cough is productive of clear phlegm.  He will have coughing fits which are ameliorated by rescue inhaler albuterol.  He is recently started a topical steroid inhaler (fluticasone) disease which seems to be helping to some extent as well.  He recently cut dairy out of his diet which he thinks may also be helping him at this point.  In addition he is extremely stressful social life and is going through divorce and this is coming to and and which is also improving his situation in general.  His symptoms are outlined below    1-cough the patient will cough frequently throughout the day he will have coughing jags  which will hurt his chest.  He has had pulmonary function studies that show borderline FEV1 over for protected present but they are still in the normal range.  The patient has amelioration of coughing jags with albuterol suggesting possible reactive airway disease.  The patient has multiple subcentimeter pulmonary nodules which are being followed on CT of the chest.  He is a lifelong non-smoker.  He denies heartburn he denies leola dysphagia to solid food or liquids.  At times he feels pills get stuck in his chest and go down slowly.  He denies a family history of esophageal cancer or lung cancer.  And ear nose and throat physician looked into his larynx and did not see any major abnormalities or any reflux related abnormalities.  He had a trial of Protonix 40 mg twice a day which did not improve his symptoms after 1 month and after this period time it was discontinued.  The patient is currently not on proton pump inhibitors.      2/24/2021  Non contrast Chest CT                                                         IMPRESSION:   1. No focal airspace opacity.  2. Multiple sub-6 mm solid pulmonary nodules are unchanged dating back  to 2020. Consider follow-up low-dose chest CT in 12 months if the  patient is determined to be high risk.  3. Colonic diverticulosis without evidence for acute diverticulitis.  4. Questionable cholelithiasis versus focal adenomyomatosis of the  gallbladder. No evidence for acute cholecystitis.    See the ENT note from October 2020-correct group below is the excerpt from the findings of the   nasal laryngoscopy  Findings: Normal nasopharynx. Normal base of tongue, valleculae, and epiglottis. Vocal fold mobility: right: normal; left: normal. Medial edges of the vocal folds: smooth with mild prominence of the mid vocal fold on the right.   Glissade produced appropriate elongation. There was moderate supraglottic recruitment with connected speech. Mucosa of false vocal folds, aryepiglottic  folds, piriform sinuses, and posterior glottis unremarkable. Airway was patent.   Similar findings on NBI.     The addition of stroboscopy allowed evaluation of the mucosal wave.   Amplitude: right: mildly decreased; left: normal. Symmetry: intermittent symmetry. Closure pattern: complete. Closure plane: at glottic level. Phase distribution: normal.    2-dysphagia the patient has mild dysphagia to pills but seems to be able to eat and drink regularly.  He denies heartburn     Video-swallow and esophagram,   11/6/2020                                       1. Mild esophageal dysmotility with a normal primary stripping wave.  2. No penetration or aspiration with any consistency barium. Please  see the speech pathologist report for further details regarding the  modified barium swallow study portion of the examination.        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  He has seen a neurologist and has had a relatively extensive work-up that has not revealed any more serious concerns.  TSH is normal.    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  He feels is mostly in the upper legs around the muscles in his upper legs.  CK has been normal.  He is not on statin medications.  He denies morning stiffness he denies joint pains.  Not he denies skin lesions although after eating a pizza recently developed hives he is not in general had any chronic skin changes or skin concerns.      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  He did not mention the symptoms to me today.    Brain MRI June 2020  1. Stable arachnoid cyst  "in the right prepontine and premedullary  cistern with mass effect on the brainstem and 7th and 8th cranial  nerves which is unchanged.  2. No abnormal enhancement of the brain or its coverings. No abnormal  enhancement in the internal auditory canals.    Neurosurgery June 2020  \". Discussed the unclear relationship between the arachnoid cyst and his symptoms. My impression is that this cyst is not likely to be causing his symptoms. He would like to continue to monitor his symptoms for now. While the cyst does have mass effect on the brainstem and cranial nerves, his cranial nerve exam is normal and his audiogram is largely normal -- and the cochear nerve tends to be a sensitive nerve for mass effect.   If all other possibilities are exhausted, we can readdress the role of surgery for his posterior fossa cyst.  A cyst fenestration could be performed via a small retromastoid craniectomy.  \"            Past Medical History:     Past Medical History:   Diagnosis Date     Chronic cough             Past Surgical History:     Past Surgical History:   Procedure Laterality Date     vasectomy              Previous Endoscopy:   No results found for this or any previous visit.         Social History:     Social History     Socioeconomic History     Marital status:      Spouse name: None     Number of children: 2     Years of education: None     Highest education level: None   Occupational History     Occupation: financial    Social Needs     Financial resource strain: None     Food insecurity     Worry: None     Inability: None     Transportation needs     Medical: None     Non-medical: None   Tobacco Use     Smoking status: Never Smoker     Smokeless tobacco: Former User     Types: Chew   Substance and Sexual Activity     Alcohol use: Yes     Alcohol/week: 0.0 standard drinks     Comment: mostly on weekends, 1-2 drinks     Drug use: No     Sexual activity: Yes     Partners: Female     Comment: vasectomy   Lifestyle     " Physical activity     Days per week: None     Minutes per session: None     Stress: None   Relationships     Social connections     Talks on phone: None     Gets together: None     Attends Zoroastrian service: None     Active member of club or organization: None     Attends meetings of clubs or organizations: None     Relationship status: None     Intimate partner violence     Fear of current or ex partner: None     Emotionally abused: None     Physically abused: None     Forced sexual activity: None   Other Topics Concern     Parent/sibling w/ CABG, MI or angioplasty before 65F 55M? Not Asked   Social History Narrative    ** Merged History Encounter **                 Family History:     Family History   Problem Relation Age of Onset     Pancreatic Cancer Maternal Grandmother      Hyperlipidemia Father      Other - See Comments Father         idiopathic pulmonary fibrosis     Idiopathic pulmonary fibrosis Father      Other - See Comments Other         Etoh dependence, dad and brother     Diabetes Other         m unc, type I     Other - See Comments Mother 80        memory loss     Sleep Apnea Mother      No known history of colorectal cancer, liver disease, or inflammatory bowel disease.         Allergies:   Reviewed and edited as appropriate   No Known Allergies         Medications:     Current Outpatient Medications   Medication Sig Dispense Refill     albuterol (VENTOLIN HFA) 108 (90 Base) MCG/ACT inhaler Inhale 2 puffs into the lungs every 4 hours as needed for shortness of breath / dyspnea or wheezing 1 Inhaler 11     fluticasone (FLOVENT HFA) 110 MCG/ACT inhaler Inhale 1 puff into the lungs 2 times daily 12 g 4     traZODone (DESYREL) 50 MG tablet Take 1 tablet (50 mg) by mouth At Bedtime 30 tablet 4     sildenafil (VIAGRA) 100 MG tablet Take 1 tablet (100 mg) by mouth daily as needed (ED) Take 30min- 4 hrs before intercourse.No use with nitroglycerin, terazosin or doxazosin. (Patient not taking: Reported on  "3/15/2021) 30 tablet 5             Review of Systems:     A complete 10 point review of systems was performed and is negative except as noted in the HPI           Physical Exam:   /82 (BP Location: Left arm, Patient Position: Sitting, Cuff Size: Adult Regular)   Pulse 72   Ht 1.803 m (5' 11\")   Wt 75.3 kg (166 lb)   SpO2 96%   BMI 23.15 kg/m    Wt:   Wt Readings from Last 2 Encounters:   03/17/21 75.3 kg (166 lb)   03/15/21 75.3 kg (166 lb)      Constitutional: cooperative, pleasant, not dyspneic/diaphoretic, no acute distress  Eyes: Sclera anicteric/injected  Respiratory: CTA B  Skin: warm, perfused, no jaundice  Neuro: AAO x 3, No asterixis mild resting tremor grossly intact from a motor perspective, no facial droop or facial flaccidity   Psych: Normal affect, normal thought discourse   MSK: No gross deformities  Gait: normal         Data:       Agapito Mahajan MD  Associate Professor of Medicine  Division of Gastroenterology, Hepatology, and Nutrition  [facility]      "

## 2021-03-17 NOTE — PATIENT INSTRUCTIONS
It was a pleasure taking care of you today.  I've included a brief summary of our discussion and care plan from today's visit below.  Please review this information with your primary care provider.  _______________________________________________________________________    My recommendations are summarized as follows:  Manometry  EGD with bravo    To schedule your endoscopy procedures call [phone]      Please call our nurse Lina/Rock with any questions or concerns - [phone].    Return to GI Clinic in  to review your progress.    _______________________________________________________________________    Who do I call with any questions after my visit?  Please be in touch if there are any further questions that arise following today's visit.  There are multiple ways to contact your gastroenterology care team.        During business hours, you may reach a Gastroenterology nurse at [phone] and choose option 3.         To schedule or reschedule an appointment, please call [phone].       You can always send a secure message through BRAND-YOURSELF.  BRAND-YOURSELF messages are answered by your nurse or doctor typically within 24 hours.  Please allow extra time on weekends and holidays.        For urgent/emergent questions after business hours, you may reach the on-call GI Fellow by contacting the [facility] at [phone].     How will I get the results of any tests ordered?    You will receive all of your results.  If you have signed up for BRAND-YOURSELF, any tests ordered at your visit will be available to you after your physician reviews them.  Typically this takes 1-2 weeks.  If there are urgent results that require a change in your care plan, your physician or nurse will call you to discuss the next steps.      What is BRAND-YOURSELF?  BRAND-YOURSELF is a secure way for you to access all of your healthcare records from the [facility].  It is a web based computer program, so you can sign on to  it from any location.  It also allows you to send secure messages to your care team.  I recommend signing up for [employer] access if you have not already done so and are comfortable with using a computer.      How to I schedule a follow-up visit?  If you did not schedule a follow-up visit today, please call [phone] to schedule a follow-up office visit.        Sincerely,    Agapito Mahajan MD  [facility]  Division of Gastroenterology

## 2021-03-20 DIAGNOSIS — G47.00 INSOMNIA, UNSPECIFIED TYPE: ICD-10-CM

## 2021-03-20 RX ORDER — TRAZODONE HYDROCHLORIDE 50 MG/1
50 TABLET, FILM COATED ORAL AT BEDTIME
Qty: 30 TABLET | Refills: 4 | Status: SHIPPED | OUTPATIENT
Start: 2021-03-20 | End: 2021-06-14

## 2021-03-21 RX ORDER — TRAZODONE HYDROCHLORIDE 50 MG/1
TABLET, FILM COATED ORAL
Qty: 90 TABLET | Refills: 1 | OUTPATIENT
Start: 2021-03-21

## 2021-03-22 NOTE — TELEPHONE ENCOUNTER
traZODone (DESYREL) 50 MG tablet 30 tablet 4 3/20/2021 > [id number] Addressed/ refilled in a different encounter.

## 2021-03-23 ENCOUNTER — PRE VISIT (OUTPATIENT)
Dept: GASTROENTEROLOGY | Facility: CLINIC | Age: 61
End: 2021-03-23

## 2021-03-29 ENCOUNTER — IMMUNIZATION (OUTPATIENT)
Dept: NURSING | Facility: CLINIC | Age: 61
End: 2021-03-29
Payer: COMMERCIAL

## 2021-03-29 PROCEDURE — 91300 PR COVID VAC PFIZER DIL RECON 30 MCG/0.3 ML IM: CPT

## 2021-03-29 PROCEDURE — 0001A PR COVID VAC PFIZER DIL RECON 30 MCG/0.3 ML IM: CPT

## 2021-04-10 ENCOUNTER — HEALTH MAINTENANCE LETTER (OUTPATIENT)
Age: 61
End: 2021-04-10

## 2021-04-13 ENCOUNTER — TELEPHONE (OUTPATIENT)
Dept: PULMONOLOGY | Facility: CLINIC | Age: 61
End: 2021-04-13

## 2021-04-13 DIAGNOSIS — Z11.59 ENCOUNTER FOR SCREENING FOR OTHER VIRAL DISEASES: ICD-10-CM

## 2021-04-13 NOTE — TELEPHONE ENCOUNTER
LVM with direct contact to schedule follow up appt per communication from KHUSHBU Roldan. Discussed that Dr. Sow is currently on leave, but we would schedule him for an appt with another provider.

## 2021-04-14 ENCOUNTER — TELEPHONE (OUTPATIENT)
Dept: PULMONOLOGY | Facility: CLINIC | Age: 61
End: 2021-04-14

## 2021-04-14 NOTE — TELEPHONE ENCOUNTER
Spoke with pt and acknowledged that he reached out to Dr. Sow and her team regarding a worsening cough. Received communication with high priority that pt would like to be seen sooner than Dr. Sow's return in June. Appt and testing was scheduled with Dr. Bui on 5/5 and details confirmed with pt.

## 2021-04-19 ENCOUNTER — IMMUNIZATION (OUTPATIENT)
Dept: NURSING | Facility: CLINIC | Age: 61
End: 2021-04-19
Attending: INTERNAL MEDICINE
Payer: COMMERCIAL

## 2021-04-19 PROCEDURE — 0002A PR COVID VAC PFIZER DIL RECON 30 MCG/0.3 ML IM: CPT

## 2021-04-19 PROCEDURE — 91300 PR COVID VAC PFIZER DIL RECON 30 MCG/0.3 ML IM: CPT

## 2021-04-21 ENCOUNTER — PATIENT OUTREACH (OUTPATIENT)
Dept: GASTROENTEROLOGY | Facility: CLINIC | Age: 61
End: 2021-04-21

## 2021-04-21 NOTE — TELEPHONE ENCOUNTER
Reached out pt to inquire if they have any questions about esophageal manometry test next week. Left vm with contact info. Covid test scheduled.

## 2021-04-23 ENCOUNTER — TELEPHONE (OUTPATIENT)
Dept: GASTROENTEROLOGY | Facility: CLINIC | Age: 61
End: 2021-04-23

## 2021-04-26 DIAGNOSIS — Z11.59 ENCOUNTER FOR SCREENING FOR OTHER VIRAL DISEASES: ICD-10-CM

## 2021-04-26 LAB
LABORATORY COMMENT REPORT: NORMAL
SARS-COV-2 RNA RESP QL NAA+PROBE: NEGATIVE
SARS-COV-2 RNA RESP QL NAA+PROBE: NORMAL
SPECIMEN SOURCE: NORMAL
SPECIMEN SOURCE: NORMAL

## 2021-04-26 PROCEDURE — U0003 INFECTIOUS AGENT DETECTION BY NUCLEIC ACID (DNA OR RNA); SEVERE ACUTE RESPIRATORY SYNDROME CORONAVIRUS 2 (SARS-COV-2) (CORONAVIRUS DISEASE [COVID-19]), AMPLIFIED PROBE TECHNIQUE, MAKING USE OF HIGH THROUGHPUT TECHNOLOGIES AS DESCRIBED BY CMS-2020-01-R: HCPCS | Performed by: PATHOLOGY

## 2021-04-26 PROCEDURE — U0005 INFEC AGEN DETEC AMPLI PROBE: HCPCS | Performed by: PATHOLOGY

## 2021-04-28 ENCOUNTER — OFFICE VISIT (OUTPATIENT)
Dept: GASTROENTEROLOGY | Facility: CLINIC | Age: 61
End: 2021-04-28
Attending: INTERNAL MEDICINE
Payer: COMMERCIAL

## 2021-04-28 VITALS
TEMPERATURE: 97.2 F | BODY MASS INDEX: 22.96 KG/M2 | DIASTOLIC BLOOD PRESSURE: 85 MMHG | SYSTOLIC BLOOD PRESSURE: 142 MMHG | OXYGEN SATURATION: 97 % | HEIGHT: 71 IN | WEIGHT: 164 LBS | HEART RATE: 64 BPM | RESPIRATION RATE: 16 BRPM

## 2021-04-28 DIAGNOSIS — R13.19 ESOPHAGEAL DYSPHAGIA: ICD-10-CM

## 2021-04-28 DIAGNOSIS — R05.3 CHRONIC COUGH: ICD-10-CM

## 2021-04-28 DIAGNOSIS — R93.3 ABNORMAL FINDING ON GI TRACT IMAGING: ICD-10-CM

## 2021-04-28 PROCEDURE — 91010 ESOPHAGUS MOTILITY STUDY: CPT

## 2021-04-28 PROCEDURE — 91037 ESOPH IMPED FUNCTION TEST: CPT

## 2021-04-28 ASSESSMENT — MIFFLIN-ST. JEOR: SCORE: 1571.03

## 2021-04-28 ASSESSMENT — PAIN SCALES - GENERAL: PAINLEVEL: NO PAIN (0)

## 2021-04-28 NOTE — TELEPHONE ENCOUNTER
Attempted to contact patient regarding upcoming EGD with Bravo procedure on 4.29.2021 for pre assessment questions.  No answer.  Left message to return call to [phone] #3    COVID test done 4.26.2021  Yvette Sims RN

## 2021-04-28 NOTE — PROGRESS NOTES
Non-Invasive GI Procedure Visit    Tommy Lerner is a 60 year old male with history of    Chronic cough  Esophageal dysphagia  Abnormal finding on GI tract imaging.   Patient stated reason for procedure: chronic cough  COVID-19 Test Performed within 48-72hrs of the procedure:  Yes. COVID-19 result was NEGATIVE.    COVID-19 PCR Results    COVID-19 PCR Results 5/28/20 7/16/20 12/31/20 4/26/21 4/26/21       0815 0815   COVID-19 Virus PCR to [facility] - Result   Detected, Abnormal Result (A) Test received-See reflex to IDDL test SARS CoV2 (COVID-19) Virus RT-PCR    COVID-19 Virus PCR to [facility] - Source   Nasopharyngeal Nasopharyngeal    COVID-19 Virus by PCR (External Result) Not Detected Not Detected      SARS-CoV-2 Virus Specimen Source     Nasopharyngeal   SARS-CoV-2 PCR Result     NEGATIVE   (A) Abnormal value       Comments are available for some flowsheets but are not being displayed.         COVID-19 Antibody Results, Testing for Immunity    COVID-19 Antibody Results, Testing for Immunity   No data to display.             Pre-Procedure Assessment  Patient presents to clinic today for Esophageal Manometry Study    Referring Provider: Dr Agapito Mahajan  Patient has not undergone previous endoscopy.    Does patient report taking a PPI (omeprazole, pantoprazole, rabeprazole, lansoprazole, esomeprazole, dexlansoprazole)? No  Does patient report taking a H2 blocker (ranitidine, or famotidine)? No  Does patient report taking opioids? No  Patient reported that last food and/or drink was last consumed 3  hours ago.  Esophageal Questionnaire(s) Completed:   Yes - Esophageal Questionnaire(s)    BEDQ Questionnaire  BEDQ Questionnaire: How Often Have You Had the Following? 4/28/2021   Trouble eating solid food (meat, bread, vegetables) 0   Trouble eating soft foods (yogurt, jello, pudding) 0   Trouble swallowing liquids 0   Pain while swallowing 0   Coughing or choking while swallowing foods or liquids 0   Total Score: 0  "    BEDQ Questionnaire: Discomfort/Pain Ratings 4/28/2021   Eating solid food (meat, bread, vegetables) 0   Eating soft foods (yogurt, jello, pudding) 0   Drinking liquid 0   Total Score: 0       Eckardt Questionnaire  Eckardt Questionnaire 4/28/2021   Dysphagia 0   Regurgitation 1   Retrosternal Pain 0   Weight Loss (kg) 0   Total Score:  1       Promis 10 Questionnaire  PROMIS 10 FLOWSHEET DATA 4/28/2021   In general, would you say your health is: 5   In general, would you say your quality of life is: 4   In general, how would you rate your physical health? 5   In general, how would you rate your mental health, including your mood and your ability to think? 4   In general, how would you rate your satisfaction with your social activities and relationships? 4   In general, please rate how well you carry out your usual social activities and roles. (This includes activities at home, at work and in your community, and responsibilities as a parent, child, spouse, employee, friend, etc.) 5   To what extent are you able to carry out your everyday physical activities such as walking, climbing stairs, carrying groceries, or moving a chair? 5   In the past 7 days, how often have you been bothered by emotional problems such as feeling anxious, depressed, or irritable? 2   In the past 7 days, how would you rate your fatigue on average? 1   In the past 7 days, how would you rate your pain on average, where 0 means no pain, and 10 means worst imaginable pain? 0   Mental health question re-calculation - no clinical value 4   Physical health question re-calculation - no clinical value 5   Pain question re-calculation - no clinical value 5   Global Mental Health Score 16   Global Physical Health Score 20   PROMIS TOTAL - SUBSCORES 36       .    Patient Hx  Patient's history, medications and allergies were reviewed.     Height: 5' 11\"   Weight: 164 lbs 0 oz    Patient Active Problem List    Diagnosis Date Noted     Anosmia " 11/08/2019     Priority: Medium     WALDEMAR (obstructive sleep apnea) 04/25/2016     Priority: Medium      Prior to Admission medications    Medication Sig Start Date End Date Taking? Authorizing Provider   sildenafil (VIAGRA) 100 MG tablet Take 1 tablet (100 mg) by mouth daily as needed (ED) Take 30min- 4 hrs before intercourse.No use with nitroglycerin, terazosin or doxazosin. 8/7/20  Yes Quentin Ash MD   albuterol (VENTOLIN HFA) 108 (90 Base) MCG/ACT inhaler Inhale 2 puffs into the lungs every 4 hours as needed for shortness of breath / dyspnea or wheezing 10/13/20   Shae Brumfield MD   traZODone (DESYREL) 50 MG tablet Take 1 tablet (50 mg) by mouth At Bedtime 3/20/21   Rahul Ramirez MD     No Known Allergies  Past Medical History:   Diagnosis Date     Chronic cough      Past Surgical History:   Procedure Laterality Date     vasectomy       Family History   Problem Relation Age of Onset     Pancreatic Cancer Maternal Grandmother      Hyperlipidemia Father      Other - See Comments Father         idiopathic pulmonary fibrosis     Idiopathic pulmonary fibrosis Father      Other - See Comments Other         Etoh dependence, dad and brother     Diabetes Other         m unc, type I     Other - See Comments Mother 80        memory loss     Sleep Apnea Mother      Social History     Tobacco Use     Smoking status: Never Smoker     Smokeless tobacco: Former User     Types: Chew   Substance Use Topics     Alcohol use: Yes     Alcohol/week: 0.0 standard drinks     Comment: mostly on weekends, 1-2 drinks        Pre-Procedure Education & Consent  Procedure education was provided to: Patient  Teaching method: Explanation  Barriers to learning: No Barrier    Patient indicated understanding of pre-procedure instruction and appropriate consent was obtained and documented.    ____________________________________________________________________    Post-Procedure Documentation: Esophageal Manometry    Manometry catheter  was placed via left nare to 53 cm and normal saline swallows given per protocol. Manometry catheter was removed at the end of test.    Discharge instructions given to patient.    Notification of pending test results sent to provider for interpretation. Please reference scanned document for final interpretation of results. Patient will follow up with referring provider for test results.    Lina Mariano RN on 4/28/2021 at 11:38 AM

## 2021-04-28 NOTE — PATIENT INSTRUCTIONS
Esophogeal Manometry Study  1. Resume regular diet.  2. You may have a bloody nose or sore throat after the procedure.  3. If you have questions call [phone] from 7:00am-5:00pm.  For afterhours questions call GI doctor on call at [phone].

## 2021-04-28 NOTE — LETTER
4/28/2021         RE: Tommy Lerner  [address]  [place] [address]        Dear Colleague,    Thank you for referring your patient, Tommy Lerner, to the [facility] GASTRO PROCED[facility] [place]. Please see a copy of my visit note below.    Non-Invasive GI Procedure Visit    Tommy Lerner is a 60 year old male with history of    Chronic cough  Esophageal dysphagia  Abnormal finding on GI tract imaging.   Patient stated reason for procedure: chronic cough  COVID-19 Test Performed within 48-72hrs of the procedure:  Yes. COVID-19 result was NEGATIVE.    COVID-19 PCR Results    COVID-19 PCR Results 5/28/20 7/16/20 12/31/20 4/26/21 4/26/21       0815 0815   COVID-19 Virus PCR to [facility] - Result   Detected, Abnormal Result (A) Test received-See reflex to IDDL test SARS CoV2 (COVID-19) Virus RT-PCR    COVID-19 Virus PCR to [facility] - Source   Nasopharyngeal Nasopharyngeal    COVID-19 Virus by PCR (External Result) Not Detected Not Detected      SARS-CoV-2 Virus Specimen Source     Nasopharyngeal   SARS-CoV-2 PCR Result     NEGATIVE   (A) Abnormal value       Comments are available for some flowsheets but are not being displayed.         COVID-19 Antibody Results, Testing for Immunity    COVID-19 Antibody Results, Testing for Immunity   No data to display.             Pre-Procedure Assessment  Patient presents to clinic today for Esophageal Manometry Study    Referring Provider: Dr Agapito Mahajan  Patient has not undergone previous endoscopy.    Does patient report taking a PPI (omeprazole, pantoprazole, rabeprazole, lansoprazole, esomeprazole, dexlansoprazole)? No  Does patient report taking a H2 blocker (ranitidine, or famotidine)? No  Does patient report taking opioids? No  Patient reported that last food and/or drink was last consumed 3  hours ago.  Esophageal Questionnaire(s) Completed:   Yes - Esophageal Questionnaire(s)    BEDQ Questionnaire  BEDQ Questionnaire: How Often Have You Had  the Following? 4/28/2021   Trouble eating solid food (meat, bread, vegetables) 0   Trouble eating soft foods (yogurt, jello, pudding) 0   Trouble swallowing liquids 0   Pain while swallowing 0   Coughing or choking while swallowing foods or liquids 0   Total Score: 0     BEDQ Questionnaire: Discomfort/Pain Ratings 4/28/2021   Eating solid food (meat, bread, vegetables) 0   Eating soft foods (yogurt, jello, pudding) 0   Drinking liquid 0   Total Score: 0       Eckardt Questionnaire  Eckardt Questionnaire 4/28/2021   Dysphagia 0   Regurgitation 1   Retrosternal Pain 0   Weight Loss (kg) 0   Total Score:  1       Promis 10 Questionnaire  PROMIS 10 FLOWSHEET DATA 4/28/2021   In general, would you say your health is: 5   In general, would you say your quality of life is: 4   In general, how would you rate your physical health? 5   In general, how would you rate your mental health, including your mood and your ability to think? 4   In general, how would you rate your satisfaction with your social activities and relationships? 4   In general, please rate how well you carry out your usual social activities and roles. (This includes activities at home, at work and in your community, and responsibilities as a parent, child, spouse, employee, friend, etc.) 5   To what extent are you able to carry out your everyday physical activities such as walking, climbing stairs, carrying groceries, or moving a chair? 5   In the past 7 days, how often have you been bothered by emotional problems such as feeling anxious, depressed, or irritable? 2   In the past 7 days, how would you rate your fatigue on average? 1   In the past 7 days, how would you rate your pain on average, where 0 means no pain, and 10 means worst imaginable pain? 0   Mental health question re-calculation - no clinical value 4   Physical health question re-calculation - no clinical value 5   Pain question re-calculation - no clinical value 5   Global Mental Health Score  "16   Global Physical Health Score 20   PROMIS TOTAL - SUBSCORES 36       .    Patient Hx  Patient's history, medications and allergies were reviewed.     Height: 5' 11\"   Weight: 164 lbs 0 oz    Patient Active Problem List    Diagnosis Date Noted     Anosmia 11/08/2019     Priority: Medium     WALDEMAR (obstructive sleep apnea) 04/25/2016     Priority: Medium      Prior to Admission medications    Medication Sig Start Date End Date Taking? Authorizing Provider   sildenafil (VIAGRA) 100 MG tablet Take 1 tablet (100 mg) by mouth daily as needed (ED) Take 30min- 4 hrs before intercourse.No use with nitroglycerin, terazosin or doxazosin. 8/7/20  Yes Quentin Ash MD   albuterol (VENTOLIN HFA) 108 (90 Base) MCG/ACT inhaler Inhale 2 puffs into the lungs every 4 hours as needed for shortness of breath / dyspnea or wheezing 10/13/20   Shae Brumfield MD   traZODone (DESYREL) 50 MG tablet Take 1 tablet (50 mg) by mouth At Bedtime 3/20/21   Rahul Ramirez MD     No Known Allergies  Past Medical History:   Diagnosis Date     Chronic cough      Past Surgical History:   Procedure Laterality Date     vasectomy       Family History   Problem Relation Age of Onset     Pancreatic Cancer Maternal Grandmother      Hyperlipidemia Father      Other - See Comments Father         idiopathic pulmonary fibrosis     Idiopathic pulmonary fibrosis Father      Other - See Comments Other         Etoh dependence, dad and brother     Diabetes Other         m unc, type I     Other - See Comments Mother 80        memory loss     Sleep Apnea Mother      Social History     Tobacco Use     Smoking status: Never Smoker     Smokeless tobacco: Former User     Types: Chew   Substance Use Topics     Alcohol use: Yes     Alcohol/week: 0.0 standard drinks     Comment: mostly on weekends, 1-2 drinks        Pre-Procedure Education & Consent  Procedure education was provided to: Patient  Teaching method: Explanation  Barriers to learning: No " Barrier    Patient indicated understanding of pre-procedure instruction and appropriate consent was obtained and documented.    ____________________________________________________________________    Post-Procedure Documentation: Esophageal Manometry    Manometry catheter was placed via left nare to 53 cm and normal saline swallows given per protocol. Manometry catheter was removed at the end of test.    Discharge instructions given to patient.    Notification of pending test results sent to provider for interpretation. Please reference scanned document for final interpretation of results. Patient will follow up with referring provider for test results.    Lina Mariano RN on 4/28/2021 at 11:38 AM

## 2021-04-29 ENCOUNTER — HOSPITAL ENCOUNTER (OUTPATIENT)
Facility: CLINIC | Age: 61
Discharge: HOME | End: 2021-04-29
Attending: INTERNAL MEDICINE | Admitting: INTERNAL MEDICINE
Payer: COMMERCIAL

## 2021-04-29 VITALS
HEART RATE: 67 BPM | OXYGEN SATURATION: 98 % | TEMPERATURE: 97.7 F | HEIGHT: 71 IN | DIASTOLIC BLOOD PRESSURE: 93 MMHG | BODY MASS INDEX: 23.64 KG/M2 | RESPIRATION RATE: 12 BRPM | WEIGHT: 168.87 LBS | SYSTOLIC BLOOD PRESSURE: 130 MMHG

## 2021-04-29 LAB — UPPER GI ENDOSCOPY: NORMAL

## 2021-04-29 PROCEDURE — 250N000011 HC RX IP 250 OP 636: Performed by: INTERNAL MEDICINE

## 2021-04-29 PROCEDURE — 43235 EGD DIAGNOSTIC BRUSH WASH: CPT | Mod: XS

## 2021-04-29 PROCEDURE — 91035 G-ESOPH REFLX TST W/ELECTROD: CPT | Performed by: INTERNAL MEDICINE

## 2021-04-29 PROCEDURE — 99153 MOD SED SAME PHYS/QHP EA: CPT | Performed by: INTERNAL MEDICINE

## 2021-04-29 PROCEDURE — 250N000009 HC RX 250: Performed by: INTERNAL MEDICINE

## 2021-04-29 PROCEDURE — G0500 MOD SEDAT ENDO SERVICE >5YRS: HCPCS | Performed by: INTERNAL MEDICINE

## 2021-04-29 RX ORDER — ONDANSETRON 2 MG/ML
4 INJECTION INTRAMUSCULAR; INTRAVENOUS
Status: DISCONTINUED | OUTPATIENT
Start: 2021-04-29 | End: 2021-04-29 | Stop reason: HOSPADM

## 2021-04-29 RX ORDER — ONDANSETRON 4 MG/1
4 TABLET, ORALLY DISINTEGRATING ORAL EVERY 6 HOURS PRN
Status: CANCELLED | OUTPATIENT
Start: 2021-04-29

## 2021-04-29 RX ORDER — LIDOCAINE 40 MG/G
CREAM TOPICAL
Status: DISCONTINUED | OUTPATIENT
Start: 2021-04-29 | End: 2021-04-29 | Stop reason: HOSPADM

## 2021-04-29 RX ORDER — NALOXONE HYDROCHLORIDE 0.4 MG/ML
0.2 INJECTION, SOLUTION INTRAMUSCULAR; INTRAVENOUS; SUBCUTANEOUS
Status: CANCELLED | OUTPATIENT
Start: 2021-04-29

## 2021-04-29 RX ORDER — PROCHLORPERAZINE MALEATE 10 MG
10 TABLET ORAL EVERY 6 HOURS PRN
Status: CANCELLED | OUTPATIENT
Start: 2021-04-29

## 2021-04-29 RX ORDER — NALOXONE HYDROCHLORIDE 0.4 MG/ML
0.4 INJECTION, SOLUTION INTRAMUSCULAR; INTRAVENOUS; SUBCUTANEOUS
Status: CANCELLED | OUTPATIENT
Start: 2021-04-29

## 2021-04-29 RX ORDER — ONDANSETRON 2 MG/ML
4 INJECTION INTRAMUSCULAR; INTRAVENOUS EVERY 6 HOURS PRN
Status: CANCELLED | OUTPATIENT
Start: 2021-04-29

## 2021-04-29 RX ORDER — FENTANYL CITRATE 50 UG/ML
INJECTION, SOLUTION INTRAMUSCULAR; INTRAVENOUS PRN
Status: COMPLETED | OUTPATIENT
Start: 2021-04-29 | End: 2021-04-29

## 2021-04-29 RX ORDER — FLUMAZENIL 0.1 MG/ML
0.2 INJECTION, SOLUTION INTRAVENOUS
Status: CANCELLED | OUTPATIENT
Start: 2021-04-29 | End: 2021-04-30

## 2021-04-29 RX ADMIN — MIDAZOLAM 2 MG: 1 INJECTION INTRAMUSCULAR; INTRAVENOUS at 12:16

## 2021-04-29 RX ADMIN — FENTANYL CITRATE 50 MCG: 50 INJECTION, SOLUTION INTRAMUSCULAR; INTRAVENOUS at 12:36

## 2021-04-29 RX ADMIN — TOPICAL ANESTHETIC 1 SPRAY: 200 SPRAY DENTAL; PERIODONTAL at 12:15

## 2021-04-29 RX ADMIN — MIDAZOLAM 1 MG: 1 INJECTION INTRAMUSCULAR; INTRAVENOUS at 12:36

## 2021-04-29 RX ADMIN — FENTANYL CITRATE 50 MCG: 50 INJECTION, SOLUTION INTRAMUSCULAR; INTRAVENOUS at 12:22

## 2021-04-29 RX ADMIN — MIDAZOLAM 1 MG: 1 INJECTION INTRAMUSCULAR; INTRAVENOUS at 12:22

## 2021-04-29 RX ADMIN — FENTANYL CITRATE 100 MCG: 50 INJECTION, SOLUTION INTRAMUSCULAR; INTRAVENOUS at 12:17

## 2021-04-29 ASSESSMENT — MIFFLIN-ST. JEOR: SCORE: 1593.13

## 2021-04-29 NOTE — OR NURSING
Procedure: Upper Endoscopy with BRAVO clip placement  Expires 7/11/2022  Lot# [id number]  ID# [id number]  Sedation: Conscious sedation (200 mcg fentanyl, 4 mg versed)  O2: 2 LPM NC during procedure  Tolerated: VS stable during and post procedure. Not c/o abdominal or chest pain.  Report: Given to Wanda RÍOS 3C  Pt to recovery area in stable condition, accompanied by KHUSHBU Murphy, RN

## 2021-05-05 ENCOUNTER — OFFICE VISIT (OUTPATIENT)
Dept: PULMONOLOGY | Facility: CLINIC | Age: 61
End: 2021-05-05
Attending: INTERNAL MEDICINE
Payer: COMMERCIAL

## 2021-05-05 VITALS
RESPIRATION RATE: 17 BRPM | BODY MASS INDEX: 23.24 KG/M2 | WEIGHT: 166 LBS | HEART RATE: 68 BPM | SYSTOLIC BLOOD PRESSURE: 145 MMHG | DIASTOLIC BLOOD PRESSURE: 81 MMHG | HEIGHT: 71 IN

## 2021-05-05 DIAGNOSIS — J18.9 PNEUMONIA OF RIGHT LOWER LOBE DUE TO INFECTIOUS ORGANISM: ICD-10-CM

## 2021-05-05 DIAGNOSIS — R05.3 PERSISTENT COUGH: ICD-10-CM

## 2021-05-05 DIAGNOSIS — R93.89 ABNORMAL CXR: ICD-10-CM

## 2021-05-05 DIAGNOSIS — R05.9 COUGH: Primary | ICD-10-CM

## 2021-05-05 LAB — PULMONARY FUNCTION TEST-FENO: 16 PPB (ref 0–40)

## 2021-05-05 PROCEDURE — 95012 NITRIC OXIDE EXP GAS DETER: CPT | Performed by: INTERNAL MEDICINE

## 2021-05-05 PROCEDURE — 99214 OFFICE O/P EST MOD 30 MIN: CPT | Mod: 25 | Performed by: INTERNAL MEDICINE

## 2021-05-05 PROCEDURE — G0463 HOSPITAL OUTPT CLINIC VISIT: HCPCS | Mod: 25

## 2021-05-05 PROCEDURE — 94070 EVALUATION OF WHEEZING: CPT | Performed by: INTERNAL MEDICINE

## 2021-05-05 PROCEDURE — 95070 INHLJ BRNCL CHALLENGE TSTG: CPT | Performed by: INTERNAL MEDICINE

## 2021-05-05 RX ORDER — BENZONATATE 200 MG/1
200 CAPSULE ORAL 3 TIMES DAILY PRN
Qty: 45 CAPSULE | Refills: 1 | Status: SHIPPED | OUTPATIENT
Start: 2021-05-05 | End: 2022-02-28

## 2021-05-05 RX ORDER — PREDNISONE 50 MG/1
50 TABLET ORAL DAILY
Qty: 5 TABLET | Refills: 0 | Status: SHIPPED | OUTPATIENT
Start: 2021-05-05 | End: 2021-07-02

## 2021-05-05 ASSESSMENT — PAIN SCALES - GENERAL: PAINLEVEL: NO PAIN (0)

## 2021-05-05 ASSESSMENT — MIFFLIN-ST. JEOR: SCORE: 1580.1

## 2021-05-05 NOTE — PROGRESS NOTES
HPI:    Mr Lerner is a 61 YO M with h/o WALDEMAR on CPAP who presents for follow up of progressive chronic cough in setting of dysphonia and dysphagia. We last visited in early November.  To briefly review, he had previously been tried on PPI and Flonase without improvement.   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  PFT's were normal and Mr. Lerner reported subjective improvement in cough with albuterol.  We discussed the possibility of treating empirically with ICS vs obtaining a methacholine challenge test for more definitive evaluation - we did discuss that methacholine challenge tests were currently not available due to COVID.  FeNO was 33.  Mr. Lerner preferred to wait and reassess symptoms pending methacholine challenge availability.  He did also have a prior CXR showing atelectasis vs infection vs aspiration at the L lung base.  We treated with a short course of Augmentin to cover for any potential infection.  Follow up CXR showed persistent LLL infiltrate prompting CT chest in early December.  CT chest was negative.  On 12/31 Mr. Lerner tested positive for COVID.      -Still coughing and feels the cough is overall worse.  Most often the cough is dry.  If he uses his inhaler (albuterol) it seems to loosen stuff up and the cough becomes productive.  Phlegm is always clear.  Subjectively albuterol does seem to help symptoms.  -Uses albuterol up to a few times per day and some days does not need to use at all.  -Has started taking Pepcid AC in the last 4-5 days in case there s some reflux/esophageal irritation.  This seems to help but has not solved the problem.  -Swallowing feels impaired.  After he eats, if he drinks a lot of water this seems to improve things.  -If he takes a deep breath he feels an aching sensation in his chest.  -No shortness of breath.  -No fevers.    -Skied 20 km recently and did not notice any  decrease in cardiopulmonary exercise capacity.  -Had no COVID symptoms that he was aware of.  Experienced some tightness in his throat for a few days.  This did not worsen his cough at all.    #Interval History: 5/5/21  -He has stopped eating dairy 2 months ago. He had an episodes of rash after eating pizza.  He has stopped using the inhaler 2 weeks ago  and his cough has improved.   -After his sprint exercise he had episode of cough.  Over all in last one week there has been improvement in cough.   - He has been producing teaspoon full of phlegm .  - NO sick contacts.       GENERAL: Healthy, alert and no distress  EYES: Eyes grossly normal to inspection.  No discharge or erythema, or obvious scleral/conjunctival abnormalities.  RESP:Coarse breathing in bases.   SKIN: Visible skin clear. No significant rash, abnormal pigmentation or lesions.  NEURO: Cranial nerves grossly intact.  Mentation and speech appropriate for age.  PSYCH: Mentation appears normal, affect normal/bright, judgement and insight intact, normal speech and appearance well-groomed.    Data:    CT Chest 12/4:  1. No acute airspace disease, particularly at the left base.     2. Scattered small pulmonary nodules as above. No further follow-up is required unless patient is at high risk for lung cancer, in which case low-dose chest CT follow-up in 12 months is optional.     3. Incidental mild colonic diverticulosis without evidence of diverticulitis.      Assessment and Plan:    Mr Lerner is a 59 YO M with h/o WALDEMAR on CPAP who presents for follow up of progressive chronic cough in setting of dysphonia and dysphagia.      1. Cough; suspected mild persistent asthma. Work up has included PFTs which were normal, essentially normal CT chest (tiny pulm nodules, low risk pt), and indeterminant  FeNO =33. As previously discussed, there is at least moderate pre-test probability that cough is associated with reactive airway disease based on response to albuterol,  FeNO and lack of other clear etiology.    -Did not improve secondary to Qvar inhaler.     # Coarse breathing suspect CAP:   -Coarse breathing along with cough, I decided to give a short course of abx and steroids.  Possible aspiration?     2. 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  Previously seen and evaluated by ENT.    -Sent a message to ENT for further work up.     3. Dysphagia.  Previous XR video swallow with mild esophageal dysmotility.  Patient with ongoing symptoms and also ? Of reflux.   -Has had a pf study done. Will get final results soon.   -Very likely that GERD could be the cause of these symptoms.     3. Covid Vaccine: Received second covid vaccine on 19th of April.     Return to clinic in 3 months.    Up to date on seasonal flu vaccine.    [facility]  Pulmonary and Critical Care Medicine

## 2021-05-05 NOTE — LETTER
5/5/2021         RE: Tommy Lerner  [address]  [place] [address]        Dear Colleague,    Thank you for referring your patient, Tommy Lerner, to the [facility] FOR LUNG SCIENCE AND [facility]. Please see a copy of my visit note below.        HPI:    Mr Lerner is a 59 YO M with h/o WALDEMAR on CPAP who presents for follow up of progressive chronic cough in setting of dysphonia and dysphagia. We last visited in early November.  To briefly review, he had previously been tried on PPI and Flonase without improvement.   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  PFT's were normal and Mr. Lerner reported subjective improvement in cough with albuterol.  We discussed the possibility of treating empirically with ICS vs obtaining a methacholine challenge test for more definitive evaluation - we did discuss that methacholine challenge tests were currently not available due to COVID.  FeNO was 33.  Mr. Lerner preferred to wait and reassess symptoms pending methacholine challenge availability.  He did also have a prior CXR showing atelectasis vs infection vs aspiration at the L lung base.  We treated with a short course of Augmentin to cover for any potential infection.  Follow up CXR showed persistent LLL infiltrate prompting CT chest in early December.  CT chest was negative.  On 12/31 Mr. Lerner tested positive for COVID.      -Still coughing and feels the cough is overall worse.  Most often the cough is dry.  If he uses his inhaler (albuterol) it seems to loosen stuff up and the cough becomes productive.  Phlegm is always clear.  Subjectively albuterol does seem to help symptoms.  -Uses albuterol up to a few times per day and some days does not need to use at all.  -Has started taking Pepcid AC in the last 4-5 days in case there s some reflux/esophageal irritation.  This seems to help  but has not solved the problem.  -Swallowing feels impaired.  After he eats, if he drinks a lot of water this seems to improve things.  -If he takes a deep breath he feels an aching sensation in his chest.  -No shortness of breath.  -No fevers.    -Skied 20 km recently and did not notice any decrease in cardiopulmonary exercise capacity.  -Had no COVID symptoms that he was aware of.  Experienced some tightness in his throat for a few days.  This did not worsen his cough at all.    #Interval History: 5/5/21  -He has stopped eating dairy 2 months ago. He had an episodes of rash after eating pizza.  He has stopped using the inhaler 2 weeks ago  and his cough has improved.   -After his sprint exercise he had episode of cough.  Over all in last one week there has been improvement in cough.   - He has been producing teaspoon full of phlegm .  - NO sick contacts.       GENERAL: Healthy, alert and no distress  EYES: Eyes grossly normal to inspection.  No discharge or erythema, or obvious scleral/conjunctival abnormalities.  RESP:Coarse breathing in bases.   SKIN: Visible skin clear. No significant rash, abnormal pigmentation or lesions.  NEURO: Cranial nerves grossly intact.  Mentation and speech appropriate for age.  PSYCH: Mentation appears normal, affect normal/bright, judgement and insight intact, normal speech and appearance well-groomed.    Data:    CT Chest 12/4:  1. No acute airspace disease, particularly at the left base.     2. Scattered small pulmonary nodules as above. No further follow-up is required unless patient is at high risk for lung cancer, in which case low-dose chest CT follow-up in 12 months is optional.     3. Incidental mild colonic diverticulosis without evidence of diverticulitis.      Assessment and Plan:    Mr Lerner is a 61 YO M with h/o WALDEMAR on CPAP who presents for follow up of progressive chronic cough in setting of dysphonia and dysphagia.      1. Cough; suspected mild persistent asthma.  Work up has included PFTs which were normal, essentially normal CT chest (tiny pulm nodules, low risk pt), and indeterminant  FeNO =33. As previously discussed, there is at least moderate pre-test probability that cough is associated with reactive airway disease based on response to albuterol, FeNO and lack of other clear etiology.    -Did not improve secondary to Qvar inhaler.     # Coarse breathing suspect CAP:   -Coarse breathing along with cough, I decided to give a short course of abx and steroids.  Possible aspiration?     2. 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  Previously seen and evaluated by ENT.    -Sent a message to ENT for further work up.     3. Dysphagia.  Previous XR video swallow with mild esophageal dysmotility.  Patient with ongoing symptoms and also ? Of reflux.   -Has had a pf study done. Will get final results soon.   -Very likely that GERD could be the cause of these symptoms.     3. Covid Vaccine: Received second covid vaccine on 19th of April.     Return to clinic in 3 months.    Up to date on seasonal flu vaccine.    Satinder Bui  Pulmonary and Critical Care Medicine         Again, thank you for allowing me to participate in the care of your patient.        Sincerely,        Satinder Bui MD

## 2021-05-05 NOTE — NURSING NOTE
Chief Complaint   Patient presents with     RECHECK     Return Worsening cough     Medications reviewed and vital signs taken.   Oniel Snow, YOJANA

## 2021-05-06 LAB
EXPTIME-%CHANGE-CHLG: 279 %
EXPTIME-CHLG: 7.32 SEC
EXPTIME-PRE: 1.93 SEC
FEF2575-%CHANGE-CHLG: -24 %
FEF2575-%PRED-CHLG: 103 %
FEF2575-%PRED-PRE: 136 %
FEF2575-PRE: 4.11 L/SEC
FEF2575-PRED: 3 L/SEC
FEFMAX-%PRED-PRE: 101 %
FEFMAX-PRE: 9.5 L/SEC
FEFMAX-PRED: 9.38 L/SEC
FEV1-%PRED-PRE: 97 %
FEV1-PRE: 3.56 L
FEV1FEV6-PRE: 89 %
FEV1FEV6-PRED: 79 %
FEV1FVC-PRE: 89 %
FEV1FVC-PRED: 77 %
FIFMAX-%CHANGE-CHLG: 16 %
FIFMAX-CHLG: 5.12 L/SEC
FIFMAX-PRE: 4.41 L/SEC
FVC-%PRED-PRE: 83 %
FVC-PRE: 4 L
FVC-PRED: 4.76 L

## 2021-05-18 ENCOUNTER — TELEPHONE (OUTPATIENT)
Dept: NEUROSURGERY | Facility: CLINIC | Age: 61
End: 2021-05-18

## 2021-06-02 ENCOUNTER — TELEPHONE (OUTPATIENT)
Dept: NEUROSURGERY | Facility: CLINIC | Age: 61
End: 2021-06-02

## 2021-06-03 ENCOUNTER — TELEPHONE (OUTPATIENT)
Dept: NEUROSURGERY | Facility: CLINIC | Age: 61
End: 2021-06-03

## 2021-06-04 ENCOUNTER — TELEPHONE (OUTPATIENT)
Dept: NEUROSURGERY | Facility: CLINIC | Age: 61
End: 2021-06-04

## 2021-06-07 ENCOUNTER — CARE COORDINATION (OUTPATIENT)
Dept: NEUROSURGERY | Facility: CLINIC | Age: 61
End: 2021-06-07

## 2021-06-07 NOTE — PROGRESS NOTES
Writer extended MRI for patient to have imaging completed prior to MD appointment on 06/16/21.   Epic message sent to [facility] and Neurosurgery Clinic Scheduling.     Faviola acuña LPN  Neurosurgery

## 2021-06-08 ENCOUNTER — TELEPHONE (OUTPATIENT)
Dept: NEUROSURGERY | Facility: CLINIC | Age: 61
End: 2021-06-08

## 2021-06-09 ENCOUNTER — TELEPHONE (OUTPATIENT)
Dept: NEUROSURGERY | Facility: CLINIC | Age: 61
End: 2021-06-09

## 2021-06-10 ENCOUNTER — ANCILLARY PROCEDURE (OUTPATIENT)
Dept: MRI IMAGING | Facility: CLINIC | Age: 61
End: 2021-06-10
Attending: NEUROLOGICAL SURGERY
Payer: COMMERCIAL

## 2021-06-10 DIAGNOSIS — G93.0 ARACHNOID CYST: ICD-10-CM

## 2021-06-10 PROCEDURE — 70551 MRI BRAIN STEM W/O DYE: CPT | Mod: GC | Performed by: RADIOLOGY

## 2021-06-11 NOTE — PROGRESS NOTES
[facility]  Department of Neurosurgery  [facility]    Chief complaint:  Posterior fossa arachnoid cyst, followup visit      Dear Dr. Brumfield and Dr. Ramirez,    It was a pleasure to see Jaime Lerner in the Center for Sk[facility] Pit[facility] today in follow-up for an arachnoid cyst.  As you recall, Mr. 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 His workup included an MRI showing a posterior fossa arachnoid cyst. I first saw him on 1/15/2020 where we elected to watch his mild symptoms given the unclear relationship with the mass effect from this cyst. His symptoms progressed when I saw him last on 5/6/2020 but his functional status remained high. He returns with a repeat MRI-brain and C/T spine imaging. He also had an audiogram that did not show significant hearing loss attributable to the cyst. An EMG was reassuring.    Since our meeting last year, he has noticed some stability of his balance and typing ability. He was able to bike 40 miles recently and does well on the bike. He does continue to have numbness and weakness on the left leg more than the right subjectively. No falls but he notices himself stabilizing himself with his arms while getting up. He has noticed no changes in hearing. His symptoms may be better has his divorce is finalizing now.    I have reviewed and updated the patient's Past Medical History,  Allergies, Social History, Family History and Medication List.    Exam:  He is fluent with speech and very pleasant.  His extraocular movements are intact without diplopia.  His face is symmetric with activation.  His tongue is midline.  He has no pronator drift.  He is full strength in all extremities.      Audiogram 1/15/2020:  Symmetric  normal range hearing bilaterally  PTA R 17dB, L 16dB  WRS R and L 100% @ 55dB    No new audiogram this year.    Imaging:  We reviewed his recent MRI-brain 6/10/2021 and compared this to his MRI from November 2019, whic demonstrate a similar appearingsimple arachnoid cyst with T2 bright contents in the right CPA/CMA with mass effect on the lower nerves 7-12, annabelle, and medulla. There are no septations or internal complexity. The basilar artery is located just medial to the cyst. The vertebral artery is just inferior to the cyst. There is no compression of the fourth ventricle and there is no hydrocephalus. The DWI is bland in this area.     His MRI-C/T spine were reviewed that do not show significant stenoses nor syrinx.    Assessment:  1. Right posterior fossa arachnoid cyst with mass effect on lower cranial nerves/brainstem  2. Imbalance, leg weakness/numbness (left more than right), tongue numbness  3. Reassuring audiogram (bilateral AAO-HNS hearing class A), EMG  4. No C/T spine stenoses    Plan:  1. Discussed the unclear relationship between the arachnoid cyst and his symptoms. My impression is that this cyst is not likely to be causing his symptoms. He would like to continue to monitor his symptoms for now. While the cyst does have mass effect on the brainstem and cranial nerves, his cranial nerve exam is normal and his audiogram is largely normal -- and the cochear nerve tends to be a sensitive nerve for mass effect.   If all other possibilities are exhausted, we can readdress the role of surgery for his posterior fossa cyst.  A cyst fenestration could be performed via a small retromastoid craniectomy.    2. We will see him back in 1-2 years with a repeat MRI and audiogram. He will visit with my partner Sonia DURHAM.  3. He follows with Dr. Ramirez who has performed a comprehensive and thoughtful neurologic assessment including EMG.  4. Discussed warning signs for which he will contact us in advance of his next  appointment      It has been a pleasure to participate in the care of your patient.  Please do not hesitate to contact me if I may be of any assistance for  Lerner.    Tommy Ahn MD

## 2021-06-13 DIAGNOSIS — G47.00 INSOMNIA, UNSPECIFIED TYPE: ICD-10-CM

## 2021-06-14 RX ORDER — TRAZODONE HYDROCHLORIDE 50 MG/1
TABLET, FILM COATED ORAL
Qty: 90 TABLET | Refills: 1 | Status: SHIPPED | OUTPATIENT
Start: 2021-06-14 | End: 2021-07-02

## 2021-06-14 NOTE — TELEPHONE ENCOUNTER
Rx Authorization:    Requested Medication/ Dose TRAZADONE 50 MG Tablet    Date last refill ordered: 3/20/21    Quantity ordered: 30 Tabs    # refills: 4    Date of last clinic visit with ordering provider: 6/15/20    Date of next clinic visit with ordering provider:     All pertinent protocol data (lab date/result):     Include pertinent information from patients message:

## 2021-06-16 ENCOUNTER — OFFICE VISIT (OUTPATIENT)
Dept: NEUROSURGERY | Facility: CLINIC | Age: 61
End: 2021-06-16
Payer: COMMERCIAL

## 2021-06-16 VITALS
DIASTOLIC BLOOD PRESSURE: 85 MMHG | WEIGHT: 166 LBS | HEART RATE: 64 BPM | BODY MASS INDEX: 23.24 KG/M2 | HEIGHT: 71 IN | SYSTOLIC BLOOD PRESSURE: 127 MMHG

## 2021-06-16 DIAGNOSIS — G93.0 ARACHNOID CYST: Primary | ICD-10-CM

## 2021-06-16 PROCEDURE — 99214 OFFICE O/P EST MOD 30 MIN: CPT | Performed by: NEUROLOGICAL SURGERY

## 2021-06-16 ASSESSMENT — MIFFLIN-ST. JEOR: SCORE: 1580.1

## 2021-06-16 ASSESSMENT — PAIN SCALES - GENERAL: PAINLEVEL: NO PAIN (0)

## 2021-06-16 NOTE — PATIENT INSTRUCTIONS
Patient to Follow-up with Sonia Love in 2 years with repeat audiogram and MRI.    Yasmine Simons RN    Thank you for choosing [facility]

## 2021-06-16 NOTE — LETTER
[place] [facility]  [facility] NEUROSURGERY CLINIC [address]  3RD FLOOR  [facility] [address]  Phone: [phone]  Fax: [phone]          6/16/2021    RE:   Tommy Lerner  [address]  [place] [address]      Dear Colleague,    Thank you for referring your patient, Tommy Lerner, to the [facility]. Please see a copy of my visit note below.      [facility]  Department of Neurosurgery  [facility]    Chief complaint:  Posterior fossa arachnoid cyst, followup visit      Dear Dr. Brumfield and Dr. Ramirez,    It was a pleasure to see Jaime Lerner in the [facility] today in follow-up for an arachnoid cyst.  As you recall, Mr. 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 His workup included an MRI showing a posterior fossa arachnoid cyst. I first saw him on 1/15/2020 where we elected to watch his mild symptoms given the unclear relationship with the mass effect from this cyst. His symptoms progressed when I saw him last on 5/6/2020 but his functional status remained high. He returns with a repeat MRI-brain and C/T spine imaging. He also had an audiogram that did not show significant hearing loss attributable to the cyst. An EMG was reassuring.    Since our meeting last year, he has noticed some stability of his balance and typing ability. He was able to bike 40 miles recently and does well on the bike. He does continue to have numbness and weakness on the left leg more than the right subjectively. No falls but he notices himself stabilizing himself with his arms while getting up. He has noticed no changes in hearing. His  symptoms may be better has his divorce is finalizing now.    I have reviewed and updated the patient's Past Medical History,  Allergies, Social History, Family History and Medication List.    Exam:  He is fluent with speech and very pleasant.  His extraocular movements are intact without diplopia.  His face is symmetric with activation.  His tongue is midline.  He has no pronator drift.  He is full strength in all extremities.      Audiogram 1/15/2020:  Symmetric normal range hearing bilaterally  PTA R 17dB, L 16dB  WRS R and L 100% @ 55dB    No new audiogram this year.    Imaging:  We reviewed his recent MRI-brain 6/10/2021 and compared this to his MRI from November 2019, whic demonstrate a similar appearingsimple arachnoid cyst with T2 bright contents in the right CPA/CMA with mass effect on the lower nerves 7-12, annabelle, and medulla. There are no septations or internal complexity. The basilar artery is located just medial to the cyst. The vertebral artery is just inferior to the cyst. There is no compression of the fourth ventricle and there is no hydrocephalus. The DWI is bland in this area.     His MRI-C/T spine were reviewed that do not show significant stenoses nor syrinx.    Assessment:  1. Right posterior fossa arachnoid cyst with mass effect on lower cranial nerves/brainstem  2. Imbalance, leg weakness/numbness (left more than right), tongue numbness  3. Reassuring audiogram (bilateral AAO-HNS hearing class A), EMG  4. No C/T spine stenoses    Plan:  1. Discussed the unclear relationship between the arachnoid cyst and his symptoms. My impression is that this cyst is not likely to be causing his symptoms. He would like to continue to monitor his symptoms for now. While the cyst does have mass effect on the brainstem and cranial nerves, his cranial nerve exam is normal and his audiogram is largely normal -- and the cochear nerve tends to be a sensitive nerve for mass effect.   If all other possibilities are  exhausted, we can readdress the role of surgery for his posterior fossa cyst.  A cyst fenestration could be performed via a small retromastoid craniectomy.    2. We will see him back in 1-2 years with a repeat MRI and audiogram. He will visit with my partner Sonia DURHAM.  3. He follows with Dr. Ramirez who has performed a comprehensive and thoughtful neurologic assessment including EMG.  4. Discussed warning signs for which he will contact us in advance of his next appointment      It has been a pleasure to participate in the care of your patient.  Please do not hesitate to contact me if I may be of any assistance for Mr. Lerner.    Tommy Ahn MD          Again, thank you for allowing me to participate in the care of your patient.      Sincerely,    Tommy Ahn MD

## 2021-06-16 NOTE — NURSING NOTE
Chief Complaint   Patient presents with     RECHECK     [facility] NEUROSURGERY     Kalpana Paul MA

## 2021-06-16 NOTE — Clinical Note
6/16/2021       RE: Tommy Lerner  [address]  [place] [address]     Dear Colleague,    Thank you for referring your patient, Tommy Lerner, to the [facility] NEUROSURGERY CLINIC [place] at [facility]. Please see a copy of my visit note below.    [facility]  Department of Neurosurgery  [facility]    Chief complaint:  Posterior fossa arachnoid cyst, followup visit      Dear Dr. Brumfield,    It was a pleasure to see Jaime Lerner in the [facility] today in follow-up for an arachnoid cyst.  As you recall, Mr. 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 His workup included an MRI showing a posterior fossa arachnoid cyst. I first saw him on 1/15/2020 where we elected to watch his mild symptoms given the unclear relationship with the mass effect from this cyst. His symptoms progressed when I saw him last on 5/6/2020 but his functional status remained high. He returns with a repeat MRI-brain and C/T spine imaging. He also had an audiogram that did not show significant hearing loss attributable to the cyst. An EMG was reassuring.    Since our meeting last year, he has noticed some stability of his balance and typing ability. He was able to bike 40 miles recently and does well on the bike. He does continue to have numbness and weakness on the left leg more than the right subjectively. No falls but he notices himself stabilizing himself with his arms while getting up. He has noticed no changes in hearing. His symptoms may be better has his divorce is finalizing now.    I have reviewed and updated the patient's Past Medical History,  Allergies, Social History,  Family History and Medication List.    Exam:  He is fluent with speech and very pleasant.  His extraocular movements are intact without diplopia.  His face is symmetric with activation.  His tongue is midline.  He has no pronator drift.  He is full strength in all extremities.      Audiogram 1/15/2020:  Symmetric normal range hearing bilaterally  PTA R 17dB, L 16dB  WRS R and L 100% @ 55dB    No new audiogram this year.    Imaging:  We reviewed his recent MRI-brain 6/10/2021 and compared this to his MRI from November 2019, whic demonstrate a similar appearingsimple arachnoid cyst with T2 bright contents in the right CPA/CMA with mass effect on the lower nerves 7-12, annabelle, and medulla. There are no septations or internal complexity. The basilar artery is located just medial to the cyst. The vertebral artery is just inferior to the cyst. There is no compression of the fourth ventricle and there is no hydrocephalus. The DWI is bland in this area.     His MRI-C/T spine were reviewed that do not show significant stenoses nor syrinx.    Assessment:  1. Right posterior fossa arachnoid cyst with mass effect on lower cranial nerves/brainstem  2. Imbalance, leg weakness/numbness (left more than right), tongue numbness  3. Reassuring audiogram (bilateral AAO-HNS hearing class A), EMG  4. No C/T spine stenoses    Plan:  1. Discussed the unclear relationship between the arachnoid cyst and his symptoms. My impression is that this cyst is not likely to be causing his symptoms. He would like to continue to monitor his symptoms for now. While the cyst does have mass effect on the brainstem and cranial nerves, his cranial nerve exam is normal and his audiogram is largely normal -- and the cochear nerve tends to be a sensitive nerve for mass effect.   If all other possibilities are exhausted, we can readdress the role of surgery for his posterior fossa cyst.  A cyst fenestration could be performed via a small retromastoid craniectomy.     2. We will see him back in 1-2 years with a repeat MRI and audiogram. He may visit with my partner Sonia DURHAM or myself  3. He follows with Dr. Ramirez who has performed a comprehensive and thoughtful neurologic assessment including EMG.  4. Discussed warning signs for which he will contact us in advance of his next appointment      It has been a pleasure to participate in the care of your patient.  Please do not hesitate to contact me if I may be of any assistance for Mr. Lerner.    Tommy Ahn MD      [facility]  Department of Neurosurgery  [place] [facility] Sk[facility] Base and Pituitary Surgery    Chief complaint:  Posterior fossa arachnoid cyst, followup visit      Dear Dr. Brumfield and Dr. Ramirez,    It was a pleasure to see Jaime Lerner in the [facility] today in follow-up for an arachnoid cyst.  As you recall, Mr. 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 His workup included an MRI showing a posterior fossa arachnoid cyst. I first saw him on 1/15/2020 where we elected to watch his mild symptoms given the unclear relationship with the mass effect from this cyst. His symptoms progressed when I saw him last on 5/6/2020 but his functional status remained high. He returns with a repeat MRI-brain and C/T spine imaging. He also had an audiogram that did not show significant hearing loss attributable to the cyst. An EMG was reassuring.    Since our meeting last year, he has noticed some stability of his balance and typing ability. He was able to bike 40 miles recently and does well on the bike. He does continue to have numbness and weakness on the left leg more than the right subjectively. No falls but he notices himself stabilizing himself with his arms while  getting up. He has noticed no changes in hearing. His symptoms may be better has his divorce is finalizing now.    I have reviewed and updated the patient's Past Medical History,  Allergies, Social History, Family History and Medication List.    Exam:  He is fluent with speech and very pleasant.  His extraocular movements are intact without diplopia.  His face is symmetric with activation.  His tongue is midline.  He has no pronator drift.  He is full strength in all extremities.      Audiogram 1/15/2020:  Symmetric normal range hearing bilaterally  PTA R 17dB, L 16dB  WRS R and L 100% @ 55dB    No new audiogram this year.    Imaging:  We reviewed his recent MRI-brain 6/10/2021 and compared this to his MRI from November 2019, whic demonstrate a similar appearingsimple arachnoid cyst with T2 bright contents in the right CPA/CMA with mass effect on the lower nerves 7-12, annabelle, and medulla. There are no septations or internal complexity. The basilar artery is located just medial to the cyst. The vertebral artery is just inferior to the cyst. There is no compression of the fourth ventricle and there is no hydrocephalus. The DWI is bland in this area.     His MRI-C/T spine were reviewed that do not show significant stenoses nor syrinx.    Assessment:  1. Right posterior fossa arachnoid cyst with mass effect on lower cranial nerves/brainstem  2. Imbalance, leg weakness/numbness (left more than right), tongue numbness  3. Reassuring audiogram (bilateral AAO-HNS hearing class A), EMG  4. No C/T spine stenoses    Plan:  1. Discussed the unclear relationship between the arachnoid cyst and his symptoms. My impression is that this cyst is not likely to be causing his symptoms. He would like to continue to monitor his symptoms for now. While the cyst does have mass effect on the brainstem and cranial nerves, his cranial nerve exam is normal and his audiogram is largely normal -- and the cochear nerve tends to be a sensitive  nerve for mass effect.   If all other possibilities are exhausted, we can readdress the role of surgery for his posterior fossa cyst.  A cyst fenestration could be performed via a small retromastoid craniectomy.    2. We will see him back in 1-2 years with a repeat MRI and audiogram. He may visit with my partner Sonia DURHAM or myself  3. He follows with Dr. Ramirez who has performed a comprehensive and thoughtful neurologic assessment including EMG.  4. Discussed warning signs for which he will contact us in advance of his next appointment      It has been a pleasure to participate in the care of your patient.  Please do not hesitate to contact me if I may be of any assistance for Mr. Lerner.    Tommy Ahn MD        Again, thank you for allowing me to participate in the care of your patient.      Sincerely,    Tommy Ahn MD

## 2021-07-02 ENCOUNTER — OFFICE VISIT (OUTPATIENT)
Dept: FAMILY MEDICINE | Facility: CLINIC | Age: 61
End: 2021-07-02
Payer: COMMERCIAL

## 2021-07-02 ENCOUNTER — ANCILLARY PROCEDURE (OUTPATIENT)
Dept: ULTRASOUND IMAGING | Facility: CLINIC | Age: 61
End: 2021-07-02
Attending: FAMILY MEDICINE
Payer: COMMERCIAL

## 2021-07-02 VITALS
SYSTOLIC BLOOD PRESSURE: 143 MMHG | HEART RATE: 60 BPM | BODY MASS INDEX: 23.59 KG/M2 | DIASTOLIC BLOOD PRESSURE: 97 MMHG | HEIGHT: 71 IN | WEIGHT: 168.5 LBS | TEMPERATURE: 96.6 F | RESPIRATION RATE: 15 BRPM | OXYGEN SATURATION: 97 %

## 2021-07-02 DIAGNOSIS — N50.89 MASS OF SCROTUM: ICD-10-CM

## 2021-07-02 DIAGNOSIS — Z12.11 COLON CANCER SCREENING: ICD-10-CM

## 2021-07-02 DIAGNOSIS — R73.09 ELEVATED GLUCOSE: ICD-10-CM

## 2021-07-02 DIAGNOSIS — Z00.00 ROUTINE GENERAL MEDICAL EXAMINATION AT A HEALTH CARE FACILITY: Primary | ICD-10-CM

## 2021-07-02 DIAGNOSIS — Z23 NEED FOR VACCINATION: ICD-10-CM

## 2021-07-02 DIAGNOSIS — Z12.5 SCREENING FOR PROSTATE CANCER: ICD-10-CM

## 2021-07-02 DIAGNOSIS — Z13.220 SCREENING FOR LIPID DISORDERS: ICD-10-CM

## 2021-07-02 DIAGNOSIS — R03.0 ELEVATED BP WITHOUT DIAGNOSIS OF HYPERTENSION: ICD-10-CM

## 2021-07-02 DIAGNOSIS — Z11.3 SCREEN FOR STD (SEXUALLY TRANSMITTED DISEASE): ICD-10-CM

## 2021-07-02 PROBLEM — F34.1 DYSTHYMIC DISORDER: Status: ACTIVE | Noted: 2021-07-02

## 2021-07-02 PROBLEM — M23.90 INTERNAL DERANGEMENT OF KNEE: Status: ACTIVE | Noted: 2021-07-02

## 2021-07-02 PROBLEM — K64.4 RESIDUAL HEMORRHOIDAL SKIN TAGS: Status: ACTIVE | Noted: 2021-07-02

## 2021-07-02 PROBLEM — G62.9 NEUROPATHY: Status: ACTIVE | Noted: 2019-10-01

## 2021-07-02 LAB
BUN SERPL-MCNC: 16 MG/DL (ref 7–30)
CALCIUM SERPL-MCNC: 9.4 MG/DL (ref 8.5–10.4)
CHLORIDE SERPLBLD-SCNC: 109 MMOL/L (ref 94–109)
CHOLEST SERPL-MCNC: 187 MG/DL (ref 0–200)
CHOLEST/HDLC SERPL: 2.3 {RATIO} (ref 0–5)
CO2 SERPL-SCNC: 29 MMOL/L (ref 20–32)
CREAT SERPL-MCNC: 1 MG/DL (ref 0.8–1.5)
EGFR CALCULATED (NON BLACK REFERENCE): 80.7
FASTING SPECIMEN: NO
GLUCOSE SERPL-MCNC: 119 MG/DL (ref 60–99)
HBA1C MFR BLD: 5.3 % (ref 4.1–5.7)
HDLC SERPL-MCNC: 80 MG/DL
LDLC SERPL CALC-MCNC: 96 MG/DL (ref 0–129)
POTASSIUM SERPL-SCNC: 4.5 MMOL/L (ref 3.4–5.3)
PSA SERPL-ACNC: 1.49 UG/L (ref 0–4)
SODIUM SERPL-SCNC: 145 MMOL/L (ref 137.3–146.3)
TRIGL SERPL-MCNC: 54 MG/DL (ref 0–150)
VLDL-CHOLESTEROL: 11 (ref 7–32)

## 2021-07-02 PROCEDURE — 93976 VASCULAR STUDY: CPT | Mod: GC | Performed by: RADIOLOGY

## 2021-07-02 PROCEDURE — 76870 US EXAM SCROTUM: CPT | Mod: GC | Performed by: RADIOLOGY

## 2021-07-02 SDOH — HEALTH STABILITY: MENTAL HEALTH: HOW MANY STANDARD DRINKS CONTAINING ALCOHOL DO YOU HAVE ON A TYPICAL DAY?: 1 OR 2

## 2021-07-02 SDOH — HEALTH STABILITY: MENTAL HEALTH: HOW OFTEN DO YOU HAVE A DRINK CONTAINING ALCOHOL?: 4 OR MORE TIMES A WEEK

## 2021-07-02 SDOH — HEALTH STABILITY: MENTAL HEALTH: HOW OFTEN DO YOU HAVE 6 OR MORE DRINKS ON ONE OCCASION?: NOT ASKED

## 2021-07-02 ASSESSMENT — MIFFLIN-ST. JEOR: SCORE: 1591.44

## 2021-07-02 ASSESSMENT — ASTHMA QUESTIONNAIRES
QUESTION_2 LAST FOUR WEEKS HOW OFTEN HAVE YOU HAD SHORTNESS OF BREATH: NOT AT ALL
ACT_TOTALSCORE: 24
QUESTION_3 LAST FOUR WEEKS HOW OFTEN DID YOUR ASTHMA SYMPTOMS (WHEEZING, COUGHING, SHORTNESS OF BREATH, CHEST TIGHTNESS OR PAIN) WAKE YOU UP AT NIGHT OR EARLIER THAN USUAL IN THE MORNING: NOT AT ALL
QUESTION_1 LAST FOUR WEEKS HOW MUCH OF THE TIME DID YOUR ASTHMA KEEP YOU FROM GETTING AS MUCH DONE AT WORK, SCHOOL OR AT HOME: NONE OF THE TIME
QUESTION_5 LAST FOUR WEEKS HOW WOULD YOU RATE YOUR ASTHMA CONTROL: COMPLETELY CONTROLLED
QUESTION_4 LAST FOUR WEEKS HOW OFTEN HAVE YOU USED YOUR RESCUE INHALER OR NEBULIZER MEDICATION (SUCH AS ALBUTEROL): ONCE A WEEK OR LESS

## 2021-07-02 NOTE — NURSING NOTE
Prior to immunization administration, verified patients identity using patient s name and date of birth. Please see Immunization Activity for additional information.     Screening Questionnaire for Adult Immunization    Are you sick today?   No   Do you have allergies to medications, food, a vaccine component or latex?   No   Have you ever had a serious reaction after receiving a vaccination?   No   Do you have a long-term health problem with heart, lung, kidney, or metabolic disease (e.g., diabetes), asthma, a blood disorder, no spleen, complement component deficiency, a cochlear implant, or a spinal fluid leak?  Are you on long-term aspirin therapy?   No   Do you have cancer, leukemia, HIV/AIDS, or any other immune system problem?   No   Do you have a parent, brother, or sister with an immune system problem?   No   In the past 3 months, have you taken medications that affect  your immune system, such as prednisone, other steroids, or anticancer drugs; drugs for the treatment of rheumatoid arthritis, Crohn s disease, or psoriasis; or have you had radiation treatments?   No   Have you had a seizure, or a brain or other nervous system problem?   No   During the past year, have you received a transfusion of blood or blood    products, or been given immune (gamma) globulin or antiviral drug?   No   For women: Are you pregnant or is there a chance you could become       pregnant during the next month?   No   Have you received any vaccinations in the past 4 weeks?   No     Immunization questionnaire answers were all negative.        Per orders of Dr. Ash, injection of Pneumovax 23 given by Sarah Nguyễn MA. Patient instructed to remain in clinic for 15 minutes afterwards, and to report any adverse reaction to me immediately.       Screening performed by Sarah Nguyễn MA on 7/2/2021 at 10:20 AM.

## 2021-07-02 NOTE — NURSING NOTE
"60 year old  Chief Complaint   Patient presents with     Physical     talk about weight changes/scale issues, possible milk allergy     Derm Problem     node/spot from cycling        Blood pressure (!) 144/92, pulse 72, temperature 96.6  F (35.9  C), temperature source Skin, resp. rate 15, height 1.803 m (5' 11\"), weight 76.4 kg (168 lb 8 oz), SpO2 97 %. Body mass index is 23.5 kg/m .  Patient Active Problem List   Diagnosis     WALDEMAR (obstructive sleep apnea)     Anosmia     Residual hemorrhoidal skin tags     Neuropathy     Internal derangement of knee     Dysthymic disorder       Wt Readings from Last 2 Encounters:   07/02/21 76.4 kg (168 lb 8 oz)   06/16/21 75.3 kg (166 lb)     BP Readings from Last 3 Encounters:   07/02/21 (!) 144/92   06/16/21 127/85   05/05/21 (!) 145/81         Current Outpatient Medications   Medication     benzonatate (TESSALON) 200 MG capsule     sildenafil (VIAGRA) 100 MG tablet     No current facility-administered medications for this visit.        Social History     Tobacco Use     Smoking status: Never Smoker     Smokeless tobacco: Former User     Types: Chew   Substance Use Topics     Alcohol use: Yes     Frequency: 4 or more times a week     Drinks per session: 1 or 2     Comment: daily beer     Drug use: No       Health Maintenance Due   Topic Date Due     ASTHMA ACTION PLAN  Never done     ASTHMA CONTROL TEST  Never done     ADVANCE CARE PLANNING  Never done     DEPRESSION ACTION PLAN  Never done     PHQ-9  Never done     Pneumococcal Vaccine: Pediatrics (0 to 5 Years) and At-Risk Patients (6 to 64 Years) (1 of 2 - PPSV23) Never done     COLORECTAL CANCER SCREENING  Never done     PREVENTIVE CARE VISIT  06/24/2016       No results found for: PAP      July 2, 2021 8:57 AM    "

## 2021-07-02 NOTE — PROGRESS NOTES
3  SUBJECTIVE:   CC: Tommy Lerner is an 60 year old male who presents for preventive health visit.     Patient has been advised of split billing requirements and indicates understanding: Yes    Healthy Habits:    Do you get at least three servings of calcium containing foods daily (dairy, green leafy vegetables, etc.)? yes    Amount of exercise or daily activities, outside of work: 6 day(s) per week    Problems taking medications regularly No    Medication side effects: No    Have you had an eye exam in the past two years? yes    Do you see a dentist twice per year? no    Do you have sleep apnea, excessive snoring or daytime drowsiness?no      PROBLEMS TO ADD ON...  Significant acute weight loss  - report a 16 pound weight loss recently  - weight in clinic today does not seem to reflect this  - thinks it could, at least partly, be due to a discrepancy in scales  - he also acknowledges he is bicycling a lot more miles in the recent heat and that this could largely be due to temporary water loss/fluctuations  - denies any muscle aches, fever, chills, headaches   - doesn't feel there have been any acute diet changes  - no consitpation/diarrhea, nausea or vomiting  - previous extensive workup for chronic cough with GI and pulmonology, currently taking tessalon PRN which makes the cough manageable    Scrotal mass  - noticed about two weeks  - posterior and inferior to RIGHT testicle, palpable right at about the pubic bone  - no so much painful, but uncomfortable if he presses on it  - doesn't seem to bother him so much when he is biking   - no hematuria, no dysuria, no penile discharge  - no issues with ejaculation    Concern for HSV  -  for years to a woman with diagnosis of HSV, but insists he never developed any sort of symptoms  - is now going through a divorce and has a new girlfriend. He would like to know if he has herpes    Today's PHQ-2 Score:   PHQ-2 ( 1999 Pf[employer]) 7/2/2021 2/8/2021   Q1: Little  interest or pleasure in doing things 0 0   Q2: Feeling down, depressed or hopeless 0 0   PHQ-2 Score 0 0       Abuse: Current or Past(Physical, Sexual or Emotional)- No  Do you feel safe in your environment? Yes    Have you ever done Advance Care Planning? (For example, a Health Directive, POLST, or a discussion with a medical provider or your loved ones about your wishes): No, advance care planning information given to patient to review.  Patient plans to discuss their wishes with loved ones or provider.      Social History     Tobacco Use     Smoking status: Never Smoker     Smokeless tobacco: Former User     Types: Chew   Substance Use Topics     Alcohol use: Yes     Frequency: 4 or more times a week     Drinks per session: 1 or 2     Comment: daily beer     If you drink alcohol do you typically have >3 drinks per day or >7 drinks per week? No                      Last PSA:   PSA   Date Value Ref Range Status   02/08/2019 1.02 0 - 4 ug/L Final     Comment:     Assay Method:  Chemiluminescence using Siemens Vista analyzer       Reviewed orders with patient. Reviewed health maintenance and updated orders accordingly - Yes  Screening for prostate and colon malignancy ordered today.     Reviewed and updated as needed this visit by clinical staff  Tobacco  Allergies  Meds  Problems  Med Hx  Surg Hx  Fam Hx        Reviewed and updated as needed this visit by Provider  Tobacco  Allergies  Meds  Problems  Med Hx  Surg Hx  Fam Hx         Past Medical History:   Diagnosis Date     Chronic cough       Past Surgical History:   Procedure Laterality Date     vasectomy         ROS:  CONSTITUTIONAL: NEGATIVE for fever, chills, change in weight  INTEGUMENTARY/SKIN: NEGATIVE for worrisome rashes, moles or lesions  EYES: NEGATIVE for vision changes or irritation  ENT: NEGATIVE for ear, mouth and throat problems  RESP: NEGATIVE for significant cough or SOB  CV: NEGATIVE for chest pain, palpitations or peripheral  "edema  GI: NEGATIVE for nausea, abdominal pain, heartburn, or change in bowel habits   male: Palpable mass as noted above. Otherwise negative for dysuria, hematuria, decreased urinary stream, erectile dysfunction, urethral discharge  MUSCULOSKELETAL: NEGATIVE for significant arthralgias or myalgia  NEURO: NEGATIVE for weakness, dizziness or paresthesias  PSYCHIATRIC: NEGATIVE for changes in mood or affect    OBJECTIVE:   BP (!) 143/97   Pulse 60   Temp 96.6  F (35.9  C) (Skin)   Resp 15   Ht 1.803 m (5' 11\")   Wt 76.4 kg (168 lb 8 oz)   SpO2 97%   BMI 23.50 kg/m       BP persistently elevated on repeat check    EXAM:  GENERAL: healthy, alert and no distress  EYES: Eyes grossly normal to inspection, PERRL and conjunctivae and sclerae normal  HENT: ear canals and TM's normal, nose and mouth without ulcers or lesions  NECK: no adenopathy, no asymmetry, masses, or scars and thyroid normal to palpation  RESP: lungs clear to auscultation - no rales, rhonchi or wheezes  CV: regular rate and rhythm, normal S1 S2, no S3 or S4, no murmur, click or rub, no peripheral edema and peripheral pulses strong  ABDOMEN: soft, nontender, no hepatosplenomegaly, no masses and bowel sounds normal  : approximate 1-2 cm mass palpated in perineum at approximate area of superficial perineal fascia, more on the RIGHT, superficial to ischiopubic ramus.   MS: no gross musculoskeletal defects noted, no edema  SKIN: no suspicious lesions or rashes  NEURO: Normal strength and tone, mentation intact and speech normal  PSYCH: mentation appears normal, affect normal/bright    Diagnostic Test Results:  Labs reviewed in Epic    ASSESSMENT/PLAN:   Tommy was seen today for physical and derm problem.    Diagnoses and all orders for this visit:    Routine general medical examination at a health care facility    Need for vaccination  -     Pneumococcal vaccine 23 valent PPSV23  (Pneumovax) [[id number]]    Screening for lipid disorders  -     Basic " "Metabolic Panel (LabDAQ)  -     Lipid Panel (LabDAQ)    Screening for prostate cancer  -     Prostate spec antigen screen    Screen for STD (sexually transmitted disease)  -     Herpes Simplex Virus 1 and 2 IgG    Colon cancer screening  -     COLOGUARD(EX[employer] SCIENCES)    Mass of scrotum  -     US Testicular & Scrotum w Doppler Ltd; Future    Elevated BP without diagnosis of hypertension    Given findings on physical exam and recent weight concerns I am ordering a scrotal U/S at this point.     Persistent elevated BP on repeat check today. Will encourage Jaime to monitor this at home. I pressure persist elevated outside of clinic will plan to have a discussion about anti-hypertensive strategies.     Agreed to testing for HSV 1&2 today.     Patient has been advised of split billing requirements and indicates understanding: Yes  COUNSELING:  Reviewed preventive health counseling, as reflected in patient instructions    Estimated body mass index is 23.5 kg/m  as calculated from the following:    Height as of this encounter: 1.803 m (5' 11\").    Weight as of this encounter: 76.4 kg (168 lb 8 oz).        He reports that he has never smoked. He quit smokeless tobacco use about 27 years ago.  His smokeless tobacco use included chew.      Counseling Resources:  ATP IV Guidelines  Pooled Cohorts Equation Calculator  FRAX Risk Assessment  ICSI Preventive Guidelines  Dietary Guidelines for Americans, 2010  USDA's MyPlate  ASA Prophylaxis  Lung CA Screening    Quentin Ash MD  [facility]  "

## 2021-07-03 ASSESSMENT — ASTHMA QUESTIONNAIRES: ACT_TOTALSCORE: 24

## 2021-07-05 LAB
HSV1 IGG SERPL QL IA: <0.2 AI (ref 0–0.8)
HSV2 IGG SERPL QL IA: <0.2 AI (ref 0–0.8)

## 2021-07-16 LAB — COLOGUARD-ABSTRACT: NEGATIVE

## 2021-07-20 ENCOUNTER — OFFICE VISIT (OUTPATIENT)
Dept: GASTROENTEROLOGY | Facility: CLINIC | Age: 61
End: 2021-07-20
Payer: COMMERCIAL

## 2021-07-20 VITALS
BODY MASS INDEX: 23.24 KG/M2 | DIASTOLIC BLOOD PRESSURE: 92 MMHG | HEIGHT: 71 IN | HEART RATE: 61 BPM | WEIGHT: 166 LBS | OXYGEN SATURATION: 97 % | SYSTOLIC BLOOD PRESSURE: 145 MMHG

## 2021-07-20 DIAGNOSIS — R05.3 CHRONIC COUGH: Primary | ICD-10-CM

## 2021-07-20 PROCEDURE — 99213 OFFICE O/P EST LOW 20 MIN: CPT | Performed by: INTERNAL MEDICINE

## 2021-07-20 ASSESSMENT — PAIN SCALES - GENERAL: PAINLEVEL: NO PAIN (0)

## 2021-07-20 ASSESSMENT — MIFFLIN-ST. JEOR: SCORE: 1580.1

## 2021-07-20 NOTE — NURSING NOTE
"Chief Complaint   Patient presents with     RECHECK     follow up after manometry, EGD and bravo        Vitals:    07/20/21 1401   BP: (!) 145/92   BP Location: Left arm   Patient Position: Sitting   Cuff Size: Adult Regular   Pulse: 61   SpO2: 97%   Weight: 166 lb   Height: 5' 11\"       Body mass index is 23.15 kg/m .    America Welch CMA    "

## 2021-07-20 NOTE — PROGRESS NOTES
"Gastroenterology Progress Note  [facility]             Reason for Follow Up:   Chronic cough follow up on pH study            ASSESSMENT AND RECOMMENDATIONS:   Assessment:  60 year old male with a history of chronic cough and throat clearing, mildly positive pH study without any correlation between cough or throat clearing and acid reflux events. He has noted a very positive correlation between dairy ingestion and symptoms.       Recommendations:  Continue dairy-free   Return PRN             History of Present Illness:   Tommy Lerner is a 60 year old male with a history of chronic cough, throat clearing.  Underwent EGD with Bravo.  EGD was normal.  Mcelroy was positive at around 6%.  No correlation between symptoms and acid reflux events was found.   He is doing remarkably well now that he discovered a dairy allergy after eating pizza.  He has avoided diary and ever since has noticed he is not coughing.    Managing his nutrition and exercise maximally .         Past Medical and Surgical History, Social History, Family History - per Epic EMR: REVIEWED         Allergies:   Reviewed and edited as appropriate     Allergies   Allergen Reactions     Lactase-Lactobacillus Cough            Medications:     Current Outpatient Medications   Medication Sig Dispense Refill     benzonatate (TESSALON) 200 MG capsule Take 1 capsule (200 mg) by mouth 3 times daily as needed for cough 45 capsule 1     sildenafil (VIAGRA) 100 MG tablet Take 1 tablet (100 mg) by mouth daily as needed (ED) Take 30min- 4 hrs before intercourse.No use with nitroglycerin, terazosin or doxazosin. 30 tablet 5             Review of Systems:     Per HPI           Physical Exam:   BP (!) 145/92 (BP Location: Left arm, Patient Position: Sitting, Cuff Size: Adult Regular)   Pulse 61   Ht 1.803 m (5' 11\")   Wt 75.3 kg (166 lb)   SpO2 97%   BMI 23.15 kg/m    Wt:   Wt Readings from Last 2 Encounters:   07/20/21 75.3 kg (166 lb)   07/02/21 76.4 kg (168 lb 8 oz) "      Constitutional: cooperative, pleasant, not dyspneic/diaphoretic, no acute distress  CV: No edema  Respiratory: Unlabored breathing  Skin: warm, perfused, no jaundice  Neuro: AAO x 3, No asterixis  Psych: Normal affect  MSK: No gross deformities      Agapito Mahajan MD  Associate Professor of Medicine  Division of Gastroenterology, Hepatology, and Nutrition  [facility]    Answers for HPI/ROS submitted by the patient on 7/15/2021  General Symptoms: No  Skin Symptoms: No  HENT Symptoms: No  EYE SYMPTOMS: No  HEART SYMPTOMS: No  LUNG SYMPTOMS: No  INTESTINAL SYMPTOMS: No  URINARY SYMPTOMS: No  REPRODUCTIVE SYMPTOMS: No  SKELETAL SYMPTOMS: No  BLOOD SYMPTOMS: No  NERVOUS SYSTEM SYMPTOMS: No  MENTAL HEALTH SYMPTOMS: No

## 2021-07-20 NOTE — LETTER
7/20/2021         RE: Tommy Lerner  [address]  [place] [address]        Dear Colleague,    Thank you for referring your patient, Tommy Lerner, to the [facility]. Please see a copy of my visit note below.    Gastroenterology Progress Note  [facility]             Reason for Follow Up:   Chronic cough follow up on pH study            ASSESSMENT AND RECOMMENDATIONS:   Assessment:  60 year old male with a history of chronic cough and throat clearing, mildly positive pH study without any correlation between cough or throat clearing and acid reflux events. He has noted a very positive correlation between dairy ingestion and symptoms.       Recommendations:  Continue dairy-free   Return PRN             History of Present Illness:   Tommy Lerner is a 60 year old male with a history of chronic cough, throat clearing.  Underwent EGD with Bravo.  EGD was normal.  Mcelroy was positive at around 6%.  No correlation between symptoms and acid reflux events was found.   He is doing remarkably well now that he discovered a dairy allergy after eating pizza.  He has avoided diary and ever since has noticed he is not coughing.    Managing his nutrition and exercise maximally .         Past Medical and Surgical History, Social History, Family History - per Epic EMR: REVIEWED         Allergies:   Reviewed and edited as appropriate     Allergies   Allergen Reactions     Lactase-Lactobacillus Cough            Medications:     Current Outpatient Medications   Medication Sig Dispense Refill     benzonatate (TESSALON) 200 MG capsule Take 1 capsule (200 mg) by mouth 3 times daily as needed for cough 45 capsule 1     sildenafil (VIAGRA) 100 MG tablet Take 1 tablet (100 mg) by mouth daily as needed (ED) Take 30min- 4 hrs before intercourse.No use with nitroglycerin, terazosin or doxazosin. 30 tablet 5             Review of Systems:     Per HPI           Physical Exam:   BP  "(!) 145/92 (BP Location: Left arm, Patient Position: Sitting, Cuff Size: Adult Regular)   Pulse 61   Ht 1.803 m (5' 11\")   Wt 75.3 kg (166 lb)   SpO2 97%   BMI 23.15 kg/m    Wt:   Wt Readings from Last 2 Encounters:   07/20/21 75.3 kg (166 lb)   07/02/21 76.4 kg (168 lb 8 oz)      Constitutional: cooperative, pleasant, not dyspneic/diaphoretic, no acute distress  CV: No edema  Respiratory: Unlabored breathing  Skin: warm, perfused, no jaundice  Neuro: AAO x 3, No asterixis  Psych: Normal affect  MSK: No gross deformities      Agapito Mahajan MD  Associate Professor of Medicine  Division of Gastroenterology, Hepatology, and Nutrition  [facility]    Answers for HPI/ROS submitted by the patient on 7/15/2021  General Symptoms: No  Skin Symptoms: No  HENT Symptoms: No  EYE SYMPTOMS: No  HEART SYMPTOMS: No  LUNG SYMPTOMS: No  INTESTINAL SYMPTOMS: No  URINARY SYMPTOMS: No  REPRODUCTIVE SYMPTOMS: No  SKELETAL SYMPTOMS: No  BLOOD SYMPTOMS: No  NERVOUS SYSTEM SYMPTOMS: No  MENTAL HEALTH SYMPTOMS: No      Again, thank you for allowing me to participate in the care of your patient.      Sincerely,    Agapito Mahajan MD    "

## 2021-07-20 NOTE — PATIENT INSTRUCTIONS
It was a pleasure taking care of you today. I've included a brief summary of our discussion and care plan from today's visit below.  Please review this information with your primary care provider.  _______________________________________________________________________    My recommendations are summarized as follows:    Recommendations:  Continue dairy-free   Return PRN      Return to GI Clinic in [facility] to review your progress.     If you need any follow-up appointments, please use the following phone numbers below.    To schedule or reschedule a follow-up GI appointment, call [phone] option 1    To schedule your endoscopy procedure, call [phone] option 2    To schedule imaging, please call [phone]     To schedule your lab appointments (at [facility] only) call [phone]. Call your [facility] lab directly if you do not use the [facility] Lab. If you use a non-[facility] lab, please let us know where to fax your lab order (call Isadora at [phone]).      _______________________________________________________________________    Please be in touch if there are any further questions that arise following today's visit.  There are multiple ways to contact your gastroenterology care team.      During business hours, you may reach your gastroenterology RN Care Coordinator, Isadora Knowles, at [phone].      You can always send a secure message through [employer]. [employer] messages are answered by your nurse or doctor typically within 24 hours. Please allow extra time on weekends and holidays.     What is [employer]?  [employer] is a secure way for you to access all of your healthcare records from the [facility].  It is a web based computer program, so you can sign on to it from any location.  It also allows you to send secure messages to your care team.  I recommend signing up for [employer] access if you have not already done so and are comfortable with using a computer.     For urgent/emergent questions after  business hours, you may reach the on-call GI Fellow by contacting the [facility]  at [phone].     How will I get the results of any tests ordered?    You will receive all of your results.  If you have signed up for [employer], any tests ordered at your visit will be available to you after your physician reviews them.  Typically this takes 1-2 weeks.  If there are urgent results that require a change in your care plan, your physician or nurse will call you to discuss the next steps.      Thank you for choosing [facility]!       Sincerely,  Agapito Mahajan MD  [facility]  Division of Gastroenterology

## 2021-09-25 ENCOUNTER — HEALTH MAINTENANCE LETTER (OUTPATIENT)
Age: 61
End: 2021-09-25

## 2022-02-03 NOTE — TELEPHONE ENCOUNTER
RECORDS RECEIVED FROM: self   REASON FOR VISIT: ARLEN PT neuropathy   Date of Appt: 5/25/22   NOTES (FOR ALL VISITS) STATUS DETAILS   OFFICE NOTE from referring provider N/A    OFFICE NOTE from other specialist Internal Dr Ramirez @ [facility];  6/15/20  5/4/20  2/4/20  12/26/19   DISCHARGE SUMMARY from hospital N/A    DISCHARGE REPORT from the ER N/A    OPERATIVE REPORT N/A    MEDICATION LIST Internal    IMAGING  (FOR ALL VISITS)     EMG Internal [facility]:  2/11/20   EEG N/A    LUMBAR PUNCTURE N/A    JOSE SCAN N/A    ULTRASOUND (CAROTID BILAT) *VASCULAR* N/A    MRI (HEAD, NECK, SPINE) Internal [facility]:  MRI Brain 6/10/21  MRI Brain 6/4/20  MRI Cervical Spine 6/4/20  MRI Thoracic Spine 6/4/20    [facility]:  MRI Brain 11/20/19   CT (HEAD, NECK, SPINE) Internal

## 2022-02-27 ENCOUNTER — APPOINTMENT (OUTPATIENT)
Dept: GENERAL RADIOLOGY | Facility: CLINIC | Age: 62
End: 2022-02-27
Attending: EMERGENCY MEDICINE
Payer: COMMERCIAL

## 2022-02-27 ENCOUNTER — HOSPITAL ENCOUNTER (EMERGENCY)
Facility: CLINIC | Age: 62
Discharge: HOME | End: 2022-02-27
Attending: EMERGENCY MEDICINE | Admitting: EMERGENCY MEDICINE
Payer: COMMERCIAL

## 2022-02-27 VITALS
BODY MASS INDEX: 24.58 KG/M2 | HEART RATE: 64 BPM | WEIGHT: 175.6 LBS | RESPIRATION RATE: 26 BRPM | TEMPERATURE: 98.3 F | SYSTOLIC BLOOD PRESSURE: 144 MMHG | HEIGHT: 71 IN | OXYGEN SATURATION: 97 % | DIASTOLIC BLOOD PRESSURE: 99 MMHG

## 2022-02-27 DIAGNOSIS — R07.9 CHEST PAIN, UNSPECIFIED TYPE: ICD-10-CM

## 2022-02-27 DIAGNOSIS — Z11.52 ENCOUNTER FOR SCREENING LABORATORY TESTING FOR SEVERE ACUTE RESPIRATORY SYNDROME CORONAVIRUS 2 (SARS-COV-2): ICD-10-CM

## 2022-02-27 DIAGNOSIS — R07.89 OTHER CHEST PAIN: ICD-10-CM

## 2022-02-27 LAB
ALBUMIN SERPL-MCNC: 3.9 G/DL (ref 3.4–5)
ALP SERPL-CCNC: 65 U/L (ref 40–150)
ALT SERPL W P-5'-P-CCNC: 25 U/L (ref 0–70)
ANION GAP SERPL CALCULATED.3IONS-SCNC: 3 MMOL/L (ref 3–14)
AST SERPL W P-5'-P-CCNC: 16 U/L (ref 0–45)
BASOPHILS # BLD AUTO: 0.1 10E3/UL (ref 0–0.2)
BASOPHILS NFR BLD AUTO: 1 %
BILIRUB SERPL-MCNC: 0.6 MG/DL (ref 0.2–1.3)
BUN SERPL-MCNC: 20 MG/DL (ref 7–30)
CALCIUM SERPL-MCNC: 9 MG/DL (ref 8.5–10.1)
CHLORIDE BLD-SCNC: 109 MMOL/L (ref 94–109)
CO2 SERPL-SCNC: 27 MMOL/L (ref 20–32)
CREAT SERPL-MCNC: 1.24 MG/DL (ref 0.66–1.25)
D DIMER PPP FEU-MCNC: <0.27 UG/ML FEU (ref 0–0.5)
EOSINOPHIL # BLD AUTO: 0.1 10E3/UL (ref 0–0.7)
EOSINOPHIL NFR BLD AUTO: 2 %
ERYTHROCYTE [DISTWIDTH] IN BLOOD BY AUTOMATED COUNT: 12.6 % (ref 10–15)
GFR SERPL CREATININE-BSD FRML MDRD: 66 ML/MIN/1.73M2
GLUCOSE BLD-MCNC: 88 MG/DL (ref 70–99)
HCT VFR BLD AUTO: 43.7 % (ref 40–53)
HGB BLD-MCNC: 15.1 G/DL (ref 13.3–17.7)
HOLD SPECIMEN: NORMAL
IMM GRANULOCYTES # BLD: 0 10E3/UL
IMM GRANULOCYTES NFR BLD: 0 %
LIPASE SERPL-CCNC: 88 U/L (ref 73–393)
LYMPHOCYTES # BLD AUTO: 2.2 10E3/UL (ref 0.8–5.3)
LYMPHOCYTES NFR BLD AUTO: 36 %
MCH RBC QN AUTO: 31.8 PG (ref 26.5–33)
MCHC RBC AUTO-ENTMCNC: 34.6 G/DL (ref 31.5–36.5)
MCV RBC AUTO: 92 FL (ref 78–100)
MONOCYTES # BLD AUTO: 0.4 10E3/UL (ref 0–1.3)
MONOCYTES NFR BLD AUTO: 7 %
NEUTROPHILS # BLD AUTO: 3.2 10E3/UL (ref 1.6–8.3)
NEUTROPHILS NFR BLD AUTO: 54 %
NRBC # BLD AUTO: 0 10E3/UL
NRBC BLD AUTO-RTO: 0 /100
NT-PROBNP SERPL-MCNC: 72 PG/ML (ref 0–900)
PLATELET # BLD AUTO: 242 10E3/UL (ref 150–450)
POTASSIUM BLD-SCNC: 3.8 MMOL/L (ref 3.4–5.3)
PROT SERPL-MCNC: 6.9 G/DL (ref 6.8–8.8)
RBC # BLD AUTO: 4.75 10E6/UL (ref 4.4–5.9)
SARS-COV-2 RNA RESP QL NAA+PROBE: NEGATIVE
SODIUM SERPL-SCNC: 139 MMOL/L (ref 133–144)
TROPONIN I SERPL HS-MCNC: 3 NG/L
TROPONIN I SERPL HS-MCNC: 3 NG/L
WBC # BLD AUTO: 6 10E3/UL (ref 4–11)

## 2022-02-27 PROCEDURE — 80053 COMPREHEN METABOLIC PANEL: CPT | Performed by: EMERGENCY MEDICINE

## 2022-02-27 PROCEDURE — 84484 ASSAY OF TROPONIN QUANT: CPT | Mod: 91 | Performed by: EMERGENCY MEDICINE

## 2022-02-27 PROCEDURE — 36415 COLL VENOUS BLD VENIPUNCTURE: CPT | Performed by: EMERGENCY MEDICINE

## 2022-02-27 PROCEDURE — U0005 INFEC AGEN DETEC AMPLI PROBE: HCPCS | Performed by: EMERGENCY MEDICINE

## 2022-02-27 PROCEDURE — 99285 EMERGENCY DEPT VISIT HI MDM: CPT | Mod: 25 | Performed by: EMERGENCY MEDICINE

## 2022-02-27 PROCEDURE — 93005 ELECTROCARDIOGRAM TRACING: CPT

## 2022-02-27 PROCEDURE — 71046 X-RAY EXAM CHEST 2 VIEWS: CPT

## 2022-02-27 PROCEDURE — 85025 COMPLETE CBC W/AUTO DIFF WBC: CPT | Performed by: EMERGENCY MEDICINE

## 2022-02-27 PROCEDURE — 85379 FIBRIN DEGRADATION QUANT: CPT | Performed by: EMERGENCY MEDICINE

## 2022-02-27 PROCEDURE — C9803 HOPD COVID-19 SPEC COLLECT: HCPCS

## 2022-02-27 PROCEDURE — 83880 ASSAY OF NATRIURETIC PEPTIDE: CPT | Performed by: EMERGENCY MEDICINE

## 2022-02-27 PROCEDURE — 93010 ELECTROCARDIOGRAM REPORT: CPT | Performed by: EMERGENCY MEDICINE

## 2022-02-27 PROCEDURE — 84484 ASSAY OF TROPONIN QUANT: CPT | Performed by: EMERGENCY MEDICINE

## 2022-02-27 PROCEDURE — 83690 ASSAY OF LIPASE: CPT | Performed by: EMERGENCY MEDICINE

## 2022-02-27 PROCEDURE — 71046 X-RAY EXAM CHEST 2 VIEWS: CPT | Mod: 26 | Performed by: RADIOLOGY

## 2022-02-27 PROCEDURE — 99285 EMERGENCY DEPT VISIT HI MDM: CPT | Mod: 25

## 2022-02-27 PROCEDURE — 250N000013 HC RX MED GY IP 250 OP 250 PS 637: Performed by: EMERGENCY MEDICINE

## 2022-02-27 RX ORDER — FAMOTIDINE 20 MG
TABLET ORAL
COMMUNITY

## 2022-02-27 RX ORDER — ASPIRIN 81 MG/1
243 TABLET, CHEWABLE ORAL ONCE
Status: COMPLETED | OUTPATIENT
Start: 2022-02-27 | End: 2022-02-27

## 2022-02-27 RX ADMIN — ASPIRIN 243 MG: 81 TABLET, CHEWABLE ORAL at 16:14

## 2022-02-27 NOTE — ED TRIAGE NOTES
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 During the race, he had to drop out because of shortness of breath and began having back and shoulder pain. Stopped the race and EMTs assessed him. Today is c/o some mild chest discomfort. His brother had a cardiac arrest at age 50 and he is concerned about his heart.

## 2022-02-27 NOTE — ED PROVIDER NOTES
"ED Provider Note  [facility]      History     Chief Complaint   Patient presents with     Chest Pain     Back Pain     HPI  Tommy Lerner is a 61 year old male with a PMH of arachnoid cyst, anosmia, chronic cough, WALDEMAR and neuropathy who presents to the ED today complaining of chest pressure.  Patient states that he is a marathon skier and had to drop out of an event this week.  Patient states he was skiing on Friday when he began breathing heavy, \"having the sensation at not enough oxygen is getting to my muscles\", bilateral anterior shoulder pain and lower back pain.  He states that he felt like he was getting very slow, noting that it was like \"skiing in [place]ell[place]O\".  He states he was being passed by many skiers before dropping out at mile 9.  He reports that as soon as he stopped skiing his symptoms immediately resolved.  He states that he is a first place skier and is in great shape.  He denies any shortness of breath during this time but states that he was \"panting\".  Patient also endorses a sensation of chest congestion/pressure since last night after he ate a dairy product.  He reports he is allergic to dairy and this makes him cough as well.  Patient notes that his brother had a heart attack 8 years ago due to plaque buildup.  Patient reports he had heart testing done 8 years ago with no plaque seen in his heart.  He also notes that his blood pressure has been climbing recently.  Patient also notes that he has a neurologist due to tremors and numbness in his lower extremity, which he states is chronic and not new.  Patient notes a history of chest congestion and cough for the past 7 years.  He reports this was due to dairy consumption and has used an inhaler for this in the past.  He states he last used this inhaler 3 days ago.  He notes that the day after his ski race his muscles are very sore, which is uncommon due to his physical shape and only skiing 9 miles a day prior.  " Patient states he has been Covid vaccinated with 2 doses as well as a booster, adding that he had asymptomatic Covid infection 14 months ago.  He also reports taking a baby aspirin this morning.  He states he has never smoked tobacco products. Patient denies chest or shoulder pain with exertion in the past, shortness of breath, dizziness, lightheadedness, abdominal pain, previous heart issues, any medication use, fever, cough, nausea, vomiting, diarrhea, history of asthma or COPD, leg swelling, history of DVT or PE or family history of DV/TPE. He denies current chest pain. No current back pain. Denies any upper back pain or posterior shoulder pain during this episode. Chest pain did not radiate to the back.     Past Medical History  Past Medical History:   Diagnosis Date     Chronic cough     Due to dairy and soy allergies.     Past Surgical History:   Procedure Laterality Date     vasectomy       sildenafil (VIAGRA) 100 MG tablet  Vitamin D, Cholecalciferol, 25 MCG (1000 UT) CAPS  albuterol (PROAIR HFA/PROVENTIL HFA/VENTOLIN HFA) 108 (90 Base) MCG/ACT inhaler      Allergies   Allergen Reactions     Lactase-Lactobacillus Cough     Soy Allergy      Family History  Family History   Problem Relation Age of Onset     Pancreatic Cancer Maternal Grandmother      Hyperlipidemia Father      Other - See Comments Father         idiopathic pulmonary fibrosis     Idiopathic pulmonary fibrosis Father      Other - See Comments Other         Etoh dependence, dad and brother     Diabetes Other         m [facility], type I     Other - See Comments Mother 80        memory loss     Sleep Apnea Mother      Social History   Social History     Tobacco Use     Smoking status: Never Smoker     Smokeless tobacco: Former User     Types: Chew     Quit date: 6/24/1994   Substance Use Topics     Alcohol use: Yes     Comment: 7-14 drinks per week     Drug use: No      Past medical history, past surgical history, medications, allergies, family history,  "and social history were reviewed with the patient. No additional pertinent items.       Review of Systems  A complete review of systems was performed with pertinent positives and negatives noted in the HPI, and all other systems negative.    Physical Exam   BP: (!) 142/98  Pulse: 70  Temp: 98.3  F (36.8  C)  Resp: 16  Height: 180.3 cm (5' 11\")  Weight: 79.7 kg (175 lb 9.6 oz)  SpO2: 95 %  Physical Exam  Vitals reviewed.   Constitutional:       General: He is not in acute distress.     Appearance: He is well-developed.   HENT:      Head: Normocephalic and atraumatic.   Eyes:      Extraocular Movements: Extraocular movements intact.      Conjunctiva/sclera: Conjunctivae normal.      Pupils: Pupils are equal, round, and reactive to light.   Cardiovascular:      Rate and Rhythm: Normal rate and regular rhythm.      Pulses: Normal pulses.      Heart sounds: Normal heart sounds. No murmur heard.    No friction rub. No gallop.   Pulmonary:      Effort: Pulmonary effort is normal. No respiratory distress.      Breath sounds: Normal breath sounds. No stridor. No wheezing or rales.   Abdominal:      General: Bowel sounds are normal. There is no distension.      Palpations: Abdomen is soft. There is no mass.      Tenderness: There is no abdominal tenderness. There is no right CVA tenderness, left CVA tenderness, guarding or rebound.   Musculoskeletal:         General: No swelling or tenderness. Normal range of motion.      Cervical back: Normal range of motion and neck supple. No rigidity.      Comments: No midline thoracic or lumbar spine tenderness   Skin:     General: Skin is warm and dry.      Capillary Refill: Capillary refill takes less than 2 seconds.      Findings: No rash.   Neurological:      General: No focal deficit present.      Mental Status: He is alert and oriented to person, place, and time.      GCS: GCS eye subscore is 4. GCS verbal subscore is 5. GCS motor subscore is 6.      Cranial Nerves: No cranial " nerve deficit.      Sensory: No sensory deficit.      Motor: No weakness or abnormal muscle tone.   Psychiatric:         Mood and Affect: Mood normal.          ED Course     3:45 PM  The patient was seen and examined by Haley Key MD in Room ED16.     Procedures            EKG Interpretation:      Interpreted by Haley Key MD  Time reviewed: 3:40 pm  Symptoms at time of EKG: chest pain   Rhythm: sinus bradycardia  Rate: bradycardia   Axis: normal  Ectopy: none  Conduction: normal  ST Segments/ T Waves: No ST-T wave changes  Q Waves: none  Comparison to prior: No old EKG available    Clinical Impression: No evidence of acute ischemia         The medical record was reviewed and interpreted.  Current labs reviewed and interpreted.  Current images reviewed and interpreted: as below.  EKG reviewed and interpreted: as above.              Results for orders placed or performed during the hospital encounter of 02/27/22   XR Chest 2 Views     Status: None    Narrative    EXAM: XR CHEST 2 VW  2/27/2022 6:07 PM     HISTORY:  chest pain       COMPARISON:  Chest radiograph 12/1/2020, CT chest abdomen pelvis  2/24/2021    FINDINGS:   The cardiac silhouette is not enlarged. The costophrenic angles are  visualized bilaterally without evidence of pleural effusion. No focal  airspace consolidation. No acute osseous anomalies. There is sclerotic  curvature with convex peak around T3 and right convex peak at T6.      Impression    IMPRESSION:   No acute focal airspace disease.     I have personally reviewed the examination and initial interpretation  and I agree with the findings.    JUSTIN PALACIOS MD         SYSTEM ID:  [id number]   Comprehensive metabolic panel     Status: Normal   Result Value Ref Range    Sodium 139 133 - 144 mmol/L    Potassium 3.8 3.4 - 5.3 mmol/L    Chloride 109 94 - 109 mmol/L    Carbon Dioxide (CO2) 27 20 - 32 mmol/L    Anion Gap 3 3 - 14 mmol/L    Urea Nitrogen 20 7 - 30 mg/dL    Creatinine  1.24 0.66 - 1.25 mg/dL    Calcium 9.0 8.5 - 10.1 mg/dL    Glucose 88 70 - 99 mg/dL    Alkaline Phosphatase 65 40 - 150 U/L    AST 16 0 - 45 U/L    ALT 25 0 - 70 U/L    Protein Total 6.9 6.8 - 8.8 g/dL    Albumin 3.9 3.4 - 5.0 g/dL    Bilirubin Total 0.6 0.2 - 1.3 mg/dL    GFR Estimate 66 >60 mL/min/1.73m2   Lipase     Status: Normal   Result Value Ref Range    Lipase 88 73 - 393 U/L   Troponin I     Status: Normal   Result Value Ref Range    Troponin I High Sensitivity 3 <79 ng/L   Nt probnp inpatient (BNP)     Status: Normal   Result Value Ref Range    N terminal Pro BNP Inpatient 72 0 - 900 pg/mL   D dimer quantitative     Status: Normal   Result Value Ref Range    D-Dimer Quantitative <0.27 0.00 - 0.50 ug/mL FEU    Narrative    This D-dimer assay is intended for use in conjunction with a clinical pretest probability assessment model to exclude pulmonary embolism (PE) and deep venous thrombosis (DVT) in outpatients suspected of PE or DVT. The cut-off value is 0.50 ug/mL FEU.   CBC with platelets and differential     Status: None   Result Value Ref Range    WBC Count 6.0 4.0 - 11.0 10e3/uL    RBC Count 4.75 4.40 - 5.90 10e6/uL    Hemoglobin 15.1 13.3 - 17.7 g/dL    Hematocrit 43.7 40.0 - 53.0 %    MCV 92 78 - 100 fL    MCH 31.8 26.5 - 33.0 pg    MCHC 34.6 31.5 - 36.5 g/dL    RDW 12.6 10.0 - 15.0 %    Platelet Count 242 150 - 450 10e3/uL    % Neutrophils 54 %    % Lymphocytes 36 %    % Monocytes 7 %    % Eosinophils 2 %    % Basophils 1 %    % Immature Granulocytes 0 %    NRBCs per 100 WBC 0 <1 /100    Absolute Neutrophils 3.2 1.6 - 8.3 10e3/uL    Absolute Lymphocytes 2.2 0.8 - 5.3 10e3/uL    Absolute Monocytes 0.4 0.0 - 1.3 10e3/uL    Absolute Eosinophils 0.1 0.0 - 0.7 10e3/uL    Absolute Basophils 0.1 0.0 - 0.2 10e3/uL    Absolute Immature Granulocytes 0.0 <=0.4 10e3/uL    Absolute NRBCs 0.0 10e3/uL   Asymptomatic COVID-19 Virus (Coronavirus) by PCR Nasopharyngeal     Status: Normal    Specimen: Nasopharyngeal;  Swab   Result Value Ref Range    SARS CoV2 PCR Negative Negative, Testing sent to reference lab. Results will be returned via unsolicited result    Narrative    Testing was performed using the Xpert Xpress SARS-CoV-2 Assay on the  Cepheid Gene-Xpert Instrument Systems. Additional information about  this Emergency Use Authorization (EUA) assay can be found via the Lab  Guide. This test should be ordered for the detection of SARS-CoV-2 in  individuals who meet SARS-CoV-2 clinical and/or epidemiological  criteria. Test performance is unknown in asymptomatic patients. This  test is for in vitro diagnostic use under the FDA EUA for  laboratories certified under CLIA to perform high complexity testing.  This test has not been FDA cleared or approved. A negative result  does not rule out the presence of PCR inhibitors in the specimen or  target RNA in concentration below the limit of detection for the  assay. The possibility of a false negative should be considered if  the patient's recent exposure or clinical presentation suggests  COVID-19. This test was validated by the [facility]. This laboratory is certified under  the Clinical Laboratory Improvement Amendments of 1988 (CLIA-88) as  qualified to perform high complexity laboratory testing.     Troponin I     Status: Normal   Result Value Ref Range    Troponin I High Sensitivity 3 <79 ng/L   EKG 12-lead, tracing only     Status: None   Result Value Ref Range    Systolic Blood Pressure  mmHg    Diastolic Blood Pressure  mmHg    Ventricular Rate 58 BPM    Atrial Rate 58 BPM    MO Interval 172 ms    QRS Duration 76 ms     ms    QTc 392 ms    P Axis 76 degrees    R AXIS 64 degrees    T Axis 52 degrees    Interpretation ECG       Sinus bradycardia  Otherwise normal ECG    Unconfirmed report - interpretation of this ECG is computer generated - see medical record for final interpretation  Confirmed by - EMERGENCY ROOM,  PHYSICIAN ([id number]),  HINA PARADA ([id number]) on 2/28/2022 4:08:59 PM     CBC with platelets differential     Status: None    Narrative    The following orders were created for panel order CBC with platelets differential.  Procedure                               Abnormality         Status                     ---------                               -----------         ------                     CBC with platelets and d...[[id number]]                      Final result                 Please view results for these tests on the individual orders.     Medications   aspirin (ASA) chewable tablet 243 mg (243 mg Oral Given 2/27/22 1614)        Assessments & Plan (with Medical Decision Making)   Presents with exertional chest pain symptoms during a ski race.  This was a few days ago.  He did have another episode of some chest discomfort that he blamed on eating dairy.  He does exercise regularly and skis long distances and has never had an issue in the past.  He however does have family history of his brother who had a cardiac arrest related to an MI at a young age.  Patient reports at that time 8 years ago he had a cardiac evaluation that was normal.  Has not had an evaluation since then.  Currently denies any chest pain.  He also reported he had some muscle soreness after his race and some low back pain.  He also reported some exertional shortness of breath at that time.  He did not have any radiation of the chest pain into the back.  Thus felt that dissection was less likely.  Was strongly considering acute coronary syndrome.  Also considered.  Musculoskeletal etiology or electrolyte abnormality.  Also considered pneumonia, PE, pneumothorax.  He did have prior asymptomatic COVID-19 infection which potentially could be reducing his endurance as well.  EKG was obtained here which revealed sinus bradycardia without evidence of acute ischemia.  Chest x-ray is unremarkable.  No signs of pneumonia or pneumothorax.  No  pulmonary edema.  He was given a full aspirin here.  CBC reveals a white blood cell count of 6 and hemoglobin of 15.1.  CMP largely unremarkable with normal electrolytes.  Lipase within normal limits.  Troponin within normal limits x2.  D-dimer is less than 0.27 making PE unlikely.  BNP is normal at 72.  COVID-19 PCR is negative.  He remained hemodynamically stable here.  No signs of acute distress.  He is currently asymptomatic.  I did discuss with him my concerns about his family history and the fact that this was exertional and that I would recommend he be admitted to the observation unit for further cardiac evaluation due to his risk factors.  He voiced understanding of this and in shared decision making he declined observation admission and would like to follow-up with his primary care provider for outpatient stress test instead.  Did discuss the risks of this which she was aware of and understood.  He is comfortable with the plan with close outpatient follow-up.  He was given strict return precautions to the emergency department.  He voiced understanding.  He was discharged in stable condition.    I have reviewed the nursing notes. I have reviewed the findings, diagnosis, plan and need for follow up with the patient.    Discharge Medication List as of 2/27/2022  6:33 PM          Final diagnoses:   Chest pain, unspecified type   I, Humberto Dye, am serving as a trained medical scribe to document services personally performed by Haley Key MD, based on the provider's statements to me.     I, Haley Key MD, was physically present and have reviewed and verified the accuracy of this note documented by Humberto Dye.      --  Haley Kye MD  [facility] EMERGENCY DEPARTMENT  2/27/2022     Haley Key MD  05/29/22 [id number]

## 2022-02-28 ENCOUNTER — VIRTUAL VISIT (OUTPATIENT)
Dept: FAMILY MEDICINE | Facility: CLINIC | Age: 62
End: 2022-02-28
Payer: COMMERCIAL

## 2022-02-28 DIAGNOSIS — R53.83 LACK OF STAMINA: Primary | ICD-10-CM

## 2022-02-28 DIAGNOSIS — R06.09 DYSPNEA ON EXERTION: ICD-10-CM

## 2022-02-28 PROBLEM — K64.4 RESIDUAL HEMORRHOIDAL SKIN TAGS: Status: RESOLVED | Noted: 2021-07-02 | Resolved: 2022-02-28

## 2022-02-28 PROBLEM — M23.90 INTERNAL DERANGEMENT OF KNEE: Status: RESOLVED | Noted: 2021-07-02 | Resolved: 2022-02-28

## 2022-02-28 PROBLEM — F34.1 DYSTHYMIC DISORDER: Status: RESOLVED | Noted: 2021-07-02 | Resolved: 2022-02-28

## 2022-02-28 LAB
ATRIAL RATE - MUSE: 58 BPM
DIASTOLIC BLOOD PRESSURE - MUSE: NORMAL MMHG
INTERPRETATION ECG - MUSE: NORMAL
P AXIS - MUSE: 76 DEGREES
PR INTERVAL - MUSE: 172 MS
QRS DURATION - MUSE: 76 MS
QT - MUSE: 400 MS
QTC - MUSE: 392 MS
R AXIS - MUSE: 64 DEGREES
SYSTOLIC BLOOD PRESSURE - MUSE: NORMAL MMHG
T AXIS - MUSE: 52 DEGREES
VENTRICULAR RATE- MUSE: 58 BPM

## 2022-02-28 RX ORDER — ALBUTEROL SULFATE 90 UG/1
2 AEROSOL, METERED RESPIRATORY (INHALATION) EVERY 4 HOURS PRN
Qty: 18 G | Refills: 3 | Status: SHIPPED | OUTPATIENT
Start: 2022-02-28 | End: 2024-04-24

## 2022-02-28 NOTE — DISCHARGE INSTRUCTIONS
Please make an appointment to follow up with Your Primary Care Provider as soon as possible for discussion of stress test/further cardiac work up.    Return for any new or worsening concerns.

## 2022-02-28 NOTE — PROGRESS NOTES
Jaime is a 61 year old who is being evaluated via a billable telephone visit.      What phone number would you like to be contacted at? [phone]  How would you like to obtain your AVS? [facility]    Assessment & Plan   Problem List Items Addressed This Visit     None      Visit Diagnoses     Lack of stamina    -  Primary    Relevant Medications    albuterol (PROAIR HFA/PROVENTIL HFA/VENTOLIN HFA) 108 (90 Base) MCG/ACT inhaler    Other Relevant Orders    Cardiopulmonary Stress Test - Adult    Dyspnea on exertion        Relevant Medications    albuterol (PROAIR HFA/PROVENTIL HFA/VENTOLIN HFA) 108 (90 Base) MCG/ACT inhaler    Other Relevant Orders    Cardiopulmonary Stress Test - Adult         Reassuring workup in the ED against MI or PE. Would like further work up to assess for worsening exertional ischemia. Also considering possible worsening pulmonary function without the use of rescue inhaler. Will start with stress testing and assess from there.     23 minutes spent on the date of the encounter doing chart review, history and exam, documentation and further activities as noted above.    Quentin Ash MD  [facility]    Subjective   Jaime is a 61 year old who presents for the following health issues    HPI  Follow up, ED visit yesterday for dyspnea on exertion/chest pain  - see ED note for details  - symptoms developed during a cross country ski race: low stamina, chest pain  - ED workup negative for acute MI or PE  - patient also reports he has been eating more dairy recently which generally always makes him cough for days afterwards  - he typically deals with the dairy symptoms by taking his rescue inhaler as needed  - last used his inhaler 3 days before the ski race  - ED wanted to keep Jaime for a stress test but that would have meant staying overnight in the ED which Jaime wanted to avoid.   - today, over the phone, he denies any persistent symptoms  - he is almost out of his rescue inhaler and requests a  refill      Review of Systems   Constitutional, HEENT, cardiovascular, pulmonary, gi and gu systems are negative, except as otherwise noted.      Objective         Vitals:  No vitals were obtained today due to virtual visit.    Physical Exam   healthy, alert and no distress  PSYCH: Alert and oriented times 3; coherent speech, normal   rate and volume, able to articulate logical thoughts, able   to abstract reason, no tangential thoughts, no hallucinations   or delusions  His affect is normal  RESP: No cough, no audible wheezing, able to talk in full sentences  Remainder of exam unable to be completed due to telephone visits      Phone call duration: 18 minutes

## 2022-03-03 ENCOUNTER — HOSPITAL ENCOUNTER (OUTPATIENT)
Dept: CARDIOLOGY | Facility: CLINIC | Age: 62
Discharge: HOME | End: 2022-03-03
Attending: FAMILY MEDICINE | Admitting: FAMILY MEDICINE
Payer: COMMERCIAL

## 2022-03-03 DIAGNOSIS — R06.09 DYSPNEA ON EXERTION: ICD-10-CM

## 2022-03-03 DIAGNOSIS — R53.83 LACK OF STAMINA: ICD-10-CM

## 2022-03-03 PROCEDURE — 93017 CV STRESS TEST TRACING ONLY: CPT

## 2022-03-03 PROCEDURE — 93018 CV STRESS TEST I&R ONLY: CPT | Performed by: INTERNAL MEDICINE

## 2022-03-03 PROCEDURE — 93016 CV STRESS TEST SUPVJ ONLY: CPT | Performed by: INTERNAL MEDICINE

## 2022-04-27 NOTE — PROGRESS NOTES
Assessment & Plan   Problem List Items Addressed This Visit        Nervous and Auditory    Neuropathy      Other Visit Diagnoses     Increased frequency of urination    -  Primary    Relevant Orders    Urinalysis Macroscopic (Completed)    Adult Urology Referral    PSA tumor marker    Basic metabolic panel    Left-sided low back pain with left-sided sciatica, unspecified chronicity        Elevated BP without diagnosis of hypertension             Considered issues include possible prostate malignancy despite previous low PSA. Alternatively, this could be chronic muscle strain vs nerve impingement. Less likely kidney stones or infection. Will check labs today and refer to urology. Discussed possible referral to spine clinic but Jaime would like to hold off on this for now. Given increased urinary frequency I will also get a BMP to assess kidney function and glucose.    34 minutes spent on the date of the encounter doing chart review, history and exam, documentation and further activities as noted.    Quentin Ash MD  [facility]    Subjective   Jaime is a 61 year old who presents for the following health issues     HPI   Increased urinary frequency  - getting up 3-4 times a night  - trying to limit evening fluid intake  - no hematuria  - no identifiable medication issues  - does ride bike long distances during the summer, but hasn't been riding over the winter  - previous PSA results have been wnls  - denies burning, smell, fever, flank pain    Low back pain  - LEFT side, radiating to buttocks  - given this and urinary issues as noted above, Jaime is concerned for possible cancer    Peripheral neuropathy  - has been an issue for the past three years  - has seen neurology for this in the past  - work up has previously been inconclusive for clear diagnosis  - does report an associated tremor as well  - thinking has been that his clinical picture may be muddied by a very difficult divorce Jaime has been going  through for the past three years. Divorce was finalized last week and Jaime feels very relieved    BP  - has been more elevated over the past three years as well  - Jaime does not check home pressures  - denies headache, chest pain or pressure, shortness of breath    Review of Systems   Constitutional, HEENT, cardiovascular, pulmonary, gi and gu systems are negative, except as otherwise noted.      Objective    BP (!) 148/98 (BP Location: Left arm, Patient Position: Chair, Cuff Size: Adult Large)   Pulse 71   Temp 97.1  F (36.2  C) (Temporal)   Resp 16   Wt 76.7 kg (169 lb)   SpO2 97%   BMI 23.57 kg/m    Body mass index is 23.57 kg/m .  Physical Exam   GENERAL: healthy, alert and no distress  EYES: Eyes grossly normal to inspection, PERRL and conjunctivae and sclerae normal  HENT: ear canals and TM's normal, nose and mouth without ulcers or lesions  NECK: no adenopathy, no asymmetry, masses, or scars and thyroid normal to palpation  RESP: lungs clear to auscultation - no rales, rhonchi or wheezes  CV: regular rate and rhythm, normal S1 S2, no S3 or S4, no murmur, click or rub, no peripheral edema and peripheral pulses strong  ABDOMEN: soft, nontender, no hepatosplenomegaly, no masses and bowel sounds normal  MS: no gross musculoskeletal defects noted, no edema  SKIN: no suspicious lesions or rashes  NEURO: Normal strength and tone, mentation intact and speech normal  PSYCH: mentation appears normal, affect normal/bright

## 2022-04-28 ENCOUNTER — OFFICE VISIT (OUTPATIENT)
Dept: FAMILY MEDICINE | Facility: CLINIC | Age: 62
End: 2022-04-28
Payer: COMMERCIAL

## 2022-04-28 VITALS
HEART RATE: 71 BPM | RESPIRATION RATE: 16 BRPM | SYSTOLIC BLOOD PRESSURE: 148 MMHG | OXYGEN SATURATION: 97 % | BODY MASS INDEX: 23.57 KG/M2 | DIASTOLIC BLOOD PRESSURE: 98 MMHG | TEMPERATURE: 97.1 F | WEIGHT: 169 LBS

## 2022-04-28 DIAGNOSIS — M54.42 LEFT-SIDED LOW BACK PAIN WITH LEFT-SIDED SCIATICA, UNSPECIFIED CHRONICITY: ICD-10-CM

## 2022-04-28 DIAGNOSIS — R35.0 INCREASED FREQUENCY OF URINATION: Primary | ICD-10-CM

## 2022-04-28 DIAGNOSIS — R03.0 ELEVATED BP WITHOUT DIAGNOSIS OF HYPERTENSION: ICD-10-CM

## 2022-04-28 DIAGNOSIS — G62.9 NEUROPATHY: ICD-10-CM

## 2022-04-28 LAB
ALBUMIN UR-MCNC: NEGATIVE MG/DL
ANION GAP SERPL CALCULATED.3IONS-SCNC: 7 MMOL/L (ref 3–14)
APPEARANCE UR: CLEAR
BILIRUB UR QL STRIP: NEGATIVE
BUN SERPL-MCNC: 16 MG/DL (ref 7–30)
CALCIUM SERPL-MCNC: 8.8 MG/DL (ref 8.5–10.1)
CHLORIDE BLD-SCNC: 110 MMOL/L (ref 94–109)
CO2 SERPL-SCNC: 22 MMOL/L (ref 20–32)
COLOR UR AUTO: ABNORMAL
CREAT SERPL-MCNC: 1.04 MG/DL (ref 0.66–1.25)
GFR SERPL CREATININE-BSD FRML MDRD: 81 ML/MIN/1.73M2
GLUCOSE BLD-MCNC: 85 MG/DL (ref 70–99)
GLUCOSE UR STRIP-MCNC: NEGATIVE MG/DL
HGB UR QL STRIP: NEGATIVE
KETONES UR STRIP-MCNC: NEGATIVE MG/DL
LEUKOCYTE ESTERASE UR QL STRIP: NEGATIVE
NITRATE UR QL: NEGATIVE
PH UR STRIP: 6 [PH] (ref 5–7)
POTASSIUM BLD-SCNC: 4.3 MMOL/L (ref 3.4–5.3)
PSA SERPL-MCNC: 1.26 UG/L (ref 0–4)
SODIUM SERPL-SCNC: 139 MMOL/L (ref 133–144)
SP GR UR STRIP: 1.02 (ref 1–1.03)
UROBILINOGEN UR STRIP-ACNC: 0.2 E.U./DL

## 2022-04-28 PROCEDURE — 84153 ASSAY OF PSA TOTAL: CPT | Performed by: FAMILY MEDICINE

## 2022-04-28 PROCEDURE — 82947 ASSAY GLUCOSE BLOOD QUANT: CPT | Performed by: FAMILY MEDICINE

## 2022-04-28 ASSESSMENT — ANXIETY QUESTIONNAIRES
IF YOU CHECKED OFF ANY PROBLEMS ON THIS QUESTIONNAIRE, HOW DIFFICULT HAVE THESE PROBLEMS MADE IT FOR YOU TO DO YOUR WORK, TAKE CARE OF THINGS AT HOME, OR GET ALONG WITH OTHER PEOPLE: NOT DIFFICULT AT ALL
6. BECOMING EASILY ANNOYED OR IRRITABLE: NOT AT ALL
GAD7 TOTAL SCORE: 1
5. BEING SO RESTLESS THAT IT IS HARD TO SIT STILL: NOT AT ALL
2. NOT BEING ABLE TO STOP OR CONTROL WORRYING: NOT AT ALL
1. FEELING NERVOUS, ANXIOUS, OR ON EDGE: SEVERAL DAYS
7. FEELING AFRAID AS IF SOMETHING AWFUL MIGHT HAPPEN: NOT AT ALL
3. WORRYING TOO MUCH ABOUT DIFFERENT THINGS: NOT AT ALL

## 2022-04-28 ASSESSMENT — PATIENT HEALTH QUESTIONNAIRE - PHQ9
SUM OF ALL RESPONSES TO PHQ QUESTIONS 1-9: 1
5. POOR APPETITE OR OVEREATING: NOT AT ALL

## 2022-04-28 ASSESSMENT — PAIN SCALES - GENERAL: PAINLEVEL: NO PAIN (0)

## 2022-04-28 NOTE — NURSING NOTE
61 year old  Chief Complaint   Patient presents with     Referral     Needs a referral to see an Urologist.        Blood pressure (!) 148/98, pulse 71, temperature 97.1  F (36.2  C), temperature source Temporal, resp. rate 16, weight 76.7 kg (169 lb), SpO2 97 %. Body mass index is 23.57 kg/m .  Patient Active Problem List   Diagnosis     WALDEMAR (obstructive sleep apnea)     Anosmia     Neuropathy       Wt Readings from Last 2 Encounters:   04/28/22 76.7 kg (169 lb)   02/27/22 79.7 kg (175 lb 9.6 oz)     BP Readings from Last 3 Encounters:   04/28/22 (!) 148/98   02/27/22 (!) 144/99   07/20/21 (!) 145/92         Current Outpatient Medications   Medication     albuterol (PROAIR HFA/PROVENTIL HFA/VENTOLIN HFA) 108 (90 Base) MCG/ACT inhaler     sildenafil (VIAGRA) 100 MG tablet     Vitamin D, Cholecalciferol, 25 MCG (1000 UT) CAPS     No current facility-administered medications for this visit.       Social History     Tobacco Use     Smoking status: Never Smoker     Smokeless tobacco: Former User     Types: Chew     Quit date: 6/24/1994   Substance Use Topics     Alcohol use: Yes     Comment: 7-14 drinks per week     Drug use: No       Health Maintenance Due   Topic Date Due     ASTHMA ACTION PLAN  Never done     ASTHMA CONTROL TEST  01/02/2022       No results found for: RAYO Cummings CMA, YOJANA  April 28, 2022 11:32 AM

## 2022-04-29 ASSESSMENT — ANXIETY QUESTIONNAIRES: GAD7 TOTAL SCORE: 1

## 2022-05-09 ENCOUNTER — PRE VISIT (OUTPATIENT)
Dept: UROLOGY | Facility: CLINIC | Age: 62
End: 2022-05-09
Payer: COMMERCIAL

## 2022-05-10 ENCOUNTER — OFFICE VISIT (OUTPATIENT)
Dept: UROLOGY | Facility: CLINIC | Age: 62
End: 2022-05-10
Attending: FAMILY MEDICINE
Payer: COMMERCIAL

## 2022-05-10 ENCOUNTER — PRE VISIT (OUTPATIENT)
Dept: UROLOGY | Facility: CLINIC | Age: 62
End: 2022-05-10
Payer: COMMERCIAL

## 2022-05-10 VITALS — SYSTOLIC BLOOD PRESSURE: 146 MMHG | DIASTOLIC BLOOD PRESSURE: 91 MMHG

## 2022-05-10 DIAGNOSIS — R39.11 URINARY HESITANCY: Primary | ICD-10-CM

## 2022-05-10 DIAGNOSIS — R35.0 INCREASED FREQUENCY OF URINATION: ICD-10-CM

## 2022-05-10 DIAGNOSIS — G62.9 NEUROPATHY: ICD-10-CM

## 2022-05-10 DIAGNOSIS — R39.198 SLOWING OF URINARY STREAM: ICD-10-CM

## 2022-05-10 PROCEDURE — 99203 OFFICE O/P NEW LOW 30 MIN: CPT | Performed by: PHYSICIAN ASSISTANT

## 2022-05-10 NOTE — NURSING NOTE
Chief Complaint   Patient presents with     Consult For     Slow stream-new symptom, cyclist, feeling of incomplete emptying         Blood pressure (!) 146/91. There is no height or weight on file to calculate BMI.    Patient Active Problem List   Diagnosis     WALDEMAR (obstructive sleep apnea)     Anosmia     Neuropathy       Allergies   Allergen Reactions     Lactase-Lactobacillus Cough     Soy Allergy        Current Outpatient Medications   Medication Sig Dispense Refill     albuterol (PROAIR HFA/PROVENTIL HFA/VENTOLIN HFA) 108 (90 Base) MCG/ACT inhaler Inhale 2 puffs into the lungs every 4 hours as needed for shortness of breath / dyspnea or wheezing 18 g 3     sildenafil (VIAGRA) 100 MG tablet Take 1 tablet (100 mg) by mouth daily as needed (ED) Take 30min- 4 hrs before intercourse.No use with nitroglycerin, terazosin or doxazosin. 30 tablet 5     Vitamin D, Cholecalciferol, 25 MCG (1000 UT) CAPS          Social History     Tobacco Use     Smoking status: Never Smoker     Smokeless tobacco: Former User     Types: Chew     Quit date: 6/24/1994   Substance Use Topics     Alcohol use: Yes     Comment: 7-14 drinks per week     Drug use: No       Tamanna Billy  5/10/2022  11:58 AM

## 2022-05-10 NOTE — PATIENT INSTRUCTIONS
UROLOGY CLINIC VISIT PATIENT INSTRUCTIONS    Schedule urodynamics testing with Caroline Lindquist PA-C next available.    URODYNAMIC TESTING    Where should I go for this test?  The procedure is performed at:     [facility]   [address] [place] [id number]   [address]    If you have questions about your test, please call our nurse triage line at [phone], option #2. If you need to cancel or reschedule your test for any reason, please notify us as soon as possible.    Please check in approximately 15 minutes prior to your procedure time.      What is urodynamic testing?   Urodynamic testing refers to a group of tests used to assess bladder function by measuring various aspects of urine storage and emptying. The test takes about 75 minutes. For most patients, the test is not painful.     How should I get ready for this test?  Please try to arrive with a comfortably full bladder if possible because you will be asked to try and urinate prior to your study.  If you received a bladder diary, please complete this prior to your urodynamic test and bring it with you to your appointment. A bladder diary measures how much fluid you are drinking and how often and how much you are urinating. You can also record any urinary leakage that may have occurred and what you were doing when you leaked.    If you have chronic constipation, please take stool softeners for two days before your test.    What happens during the test?  You will first be asked to empty your bladder in a private restroom into a special toilet called a uroflow machine which measures the rate of urine flow.  A nurse will then place a very small tube (called a catheter) into your bladder. This drains any urine left over after urinating and also measures the pressures inside of your bladder during your test. Another small catheter will then be placed into your rectum to measure abdominal pressures. Two  small sticky patches will be placed on the skin near your anus to measure pelvic floor function.   We will then instill contrast dye into your bladder through the bladder catheter. The contrast is very dense and will allow us to take x-ray pictures of your bladder intermittently during your test.  You will be asked to tell us when you first start to feel like your bladder is filling up, when you have moderate urgency to urinate, when you have very strong urgency to urinate and finally when you feel that your bladder is full. Once you feel full you will be asked to try and urinate.    You may be asked to cough or bear down several times during your procedure. The provider running your study will be looking for urine leakage during this time.      What happens after the test?  The provider running your urodynamic study will share the results of your test on the day of your procedure or very soon after.  After your test, you may go about your day as normal. You may notice some blood in your urine for a couple of days which should clear up on its own. You may also feel a more urgent need to use the toilet or you may need to go more often - this is due to having a catheter placed and should resolve on its own in a few days.      If you have any issues, questions or concerns in the meantime, do not hesitate to contact us at [phone] or via [facility].     It was a pleasure meeting with you today.  Thank you for allowing me and my team the privilege of caring for you today.  YOU are the reason we are here, and I truly hope we provided you with the excellent service you deserve.  Please let us know if there is anything else we can do for you so that we can be sure you are leaving completely satisfied with your care experience.

## 2022-05-10 NOTE — PROGRESS NOTES
Name: Tommy Lerner    MRN: [id number]   YOB: 1960                 Chief Complaint:   Slow urinary stream         Assessment and Plan:   61 year old male with neuropathy of unknown etiology and LUTS characterized by urinary hesitancy, slow stream, and frequency. We discussed possible etiologies for his symptoms including outlet obstruction (BPH, pelvic floor tension, less likely urethral stricture) versus detrusor hypocontractility. He does have a family history of BPH though prostate is only very mildly enlarged on JONNA today. Additionally, his PSA is low so low suspicion for prostate malignancy. He is extremely active and is an avid cyclist, [employer] skier, etc., so there is a good chance his LUTS are secondary to pelvic floor tension. We discussed management options to include referral to pelvic floor physical therapy, trial of alpha blocker therapy, or further evaluation with urodynamics +/- cystoscopy. Given his neuropathy, shared decision making was made to proceed with UDS to more clearly delineate the etiology of his symptoms. If evidence for outlet obstruction, consider referral to PFPT vs. alpha blocker vs. cystoscopy to further evaluate the bladder outlet.   -VUDS next available.  -Bladder diaries beforehand.    Further recommendations pending UDS findings.    Caroline Lindquist PA-C  May 11, 2022          History of Present Illness:   Tommy Lerner is a 61 year old male seen in consultation from Dr. Ash for evaluation of slow urinary stream. Jaime notes a significant change in his voiding over the last year with slowing of his stream and urinary hesitancy. He also suspects that he is not emptying his bladder and has some urinary frequency and urgency as a result. He denies urinary incontinence, dysuria, gross hematuria, history of UTI. He can sit or stand to void without appreciable difference in the strength of his stream.    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 Recently, he has had some issues with neuropathy. Reports that his neurologist thinks it's from stress related to his recent divorce. He does not have any history of DM or other diagnosed neurologic conditions. Chart review indicates that he has seen neurosurgery for a large right posterior fossa arachnoid cyst with mass effect on lower cranial nerves/brainsteam, though it was felt unlikely that this was contributing to his clinical neuropathy symptoms.    His father had what sounds like BPH and underwent TURP or similar procedure. No known family history of prostate cancer.          Past Medical History:     Past Medical History:   Diagnosis Date     Chronic cough     Due to dairy and soy allergies.            Past Surgical History:     Past Surgical History:   Procedure Laterality Date     vasectomy              Social History:     Social History     Tobacco Use     Smoking status: Never Smoker     Smokeless tobacco: Former User     Types: Chew     Quit date: 6/24/1994   Substance Use Topics     Alcohol use: Yes     Comment: 7-14 drinks per week            Family History:     Family History   Problem Relation Age of Onset     Pancreatic Cancer Maternal Grandmother      Hyperlipidemia Father      Other - See Comments Father         idiopathic pulmonary fibrosis     Idiopathic pulmonary fibrosis Father      Other - See Comments Other         Etoh dependence, dad and brother     Diabetes Other         m [facility], type I     Other - See Comments Mother 80        memory loss     Sleep Apnea Mother             Allergies:     Allergies   Allergen Reactions     Lactase-Lactobacillus Cough     Soy Allergy             Medications:     Current Outpatient Medications   Medication Sig     albuterol (PROAIR HFA/PROVENTIL HFA/VENTOLIN HFA) 108 (90 Base) MCG/ACT inhaler Inhale 2 puffs into the lungs every 4 hours as needed for shortness of breath / dyspnea or wheezing     sildenafil (VIAGRA) 100 MG tablet  "Take 1 tablet (100 mg) by mouth daily as needed (ED) Take 30min- 4 hrs before intercourse.No use with nitroglycerin, terazosin or doxazosin.     Vitamin D, Cholecalciferol, 25 MCG (1000 UT) CAPS      No current facility-administered medications for this visit.             Review of Systems:    ROS: 14 point ROS neg other than the symptoms noted above in the HPI and PMH.          Physical Exam:   B/P: 146/91, T: Data Unavailable, P: Data Unavailable, R: Data Unavailable  Estimated body mass index is 23.57 kg/m  as calculated from the following:    Height as of 2/27/22: 1.803 m (5' 11\").    Weight as of 4/28/22: 76.7 kg (169 lb).  General: age-appropriate appearing male in NAD.  HEENT: Head AT/NC, EOMI, CN Grossly intact  Resp: no respiratory distress.  : deferred.  Rectal exam: good sphincter tone, smooth rectal mucosa. Prostate is mildly enlarged without nodules, mass, asymmetry, or tenderness.   Neuro: grossly intact  Motor: excellent strength throughout  Skin: clear of rashes or ecchymoses.        Labs:      Lab Results   Component Value Date    PSA 1.26 04/28/2022    PSA 1.49 07/02/2021    PSA 1.02 02/08/2019    PSA 0.96 06/24/2015       Creatinine   Date Value Ref Range Status   04/28/2022 1.04 0.66 - 1.25 mg/dL Final   07/02/2021 1.0 0.8 - 1.5 mg/dL Final       Color Urine (no units)   Date Value   04/28/2022 Dark Yellow (A)   12/26/2019 Yellow     Appearance Urine (no units)   Date Value   04/28/2022 Clear   12/26/2019 Clear     Glucose Urine (mg/dL)   Date Value   04/28/2022 Negative   12/26/2019 Negative     Bilirubin Urine (no units)   Date Value   04/28/2022 Negative   12/26/2019 Negative     Ketones Urine (mg/dL)   Date Value   04/28/2022 Negative   12/26/2019 Negative     Specific Gravity Urine (no units)   Date Value   04/28/2022 1.020   12/26/2019 1.023     pH Urine   Date Value   04/28/2022 6.0   12/26/2019 6.0 pH     Protein Albumin Urine (mg/dL)   Date Value   04/28/2022 Negative   12/26/2019 " Negative     Urobilinogen Urine (E.U./dL)   Date Value   04/28/2022 0.2     Nitrite Urine (no units)   Date Value   04/28/2022 Negative   12/26/2019 Negative     Leukocyte Esterase Urine (no units)   Date Value   04/28/2022 Negative   12/26/2019 Negative           Imaging:    Ultrasound scrotum 7/2/2021     Findings:   The left testicle measures 3.1 x 1.9 x 4.2 cm. The left epididymis  measures 0.8 x 0.6 x 1.0 cm. There is homogeneous normal echotexture  throughout the testis, no evidence of mass or abnormal calcifications.  Doppler evaluation shows normal arterial waveforms with the testis.  There is no hydrocele. Varicocele is noted lateral to the left  testicle measuring up to 4 mm.     The right testicle measures 3.2 x 2.5 x 4.7 cm. The right epididymis  measures 1.1 x 0.7 x 1.1 cm. There is homogeneous normal echotexture  throughout the testis, no evidence of mass or abnormal calcifications.  Doppler evaluation shows normal arterial waveforms with the testis.  There is no hydrocele or varicocele.       To the right of the testicle there is a 0.4 x 0.5 x 0.6 hypoechoic  lesion that correlates with area of patient's symptoms. This area  appears to be contiguous with superficial vasculature in right the  pelvic floor.                                                                      Impression:   1. Left varicocele.  2. Prominent superficial vasculature of the right pelvic floor most  likely correlating to what patient describes as mass.       40 minutes spent on the date of the encounter doing chart review, review of test results, interpretation of tests, patient visit and documentation

## 2022-05-10 NOTE — TELEPHONE ENCOUNTER
MEDICAL RECORDS REQUEST   [facility]  [facility]rology Clinic  [address] [place] [address]  PHONE: [phone]  Fax: [phone]        FUTURE VISIT INFORMATION                                                   Tommy Lerner, : 1960 scheduled for future visit at [facility]    APPOINTMENT INFORMATION:    Date: 05/10/2022    Provider:  Caroline Lindquist PA-C    Reason for Visit/Diagnosis: Increased frequency of urination    REFERRAL INFORMATION:    Referring provider:  Quentin Ash MD    Referring providers clinic:  [facility]    RECORDS REQUESTED FOR VISIT                                                     NOTES  STATUS/DETAILS   OFFICE NOTE from referring provider  yes, 2022 - Quentin Ash MD in [facility]   MEDICATION LIST  yes   LABS     URINALYSIS (UA)  yes, 2022     PRE-VISIT CHECKLIST      Record collection complete Yes   Appointment appropriately scheduled           (right time/right provider) Yes   Joint diagnostic appointment coordinated correctly          (ensure right order & amount of time) Yes   MyChart activation Yes   Questionnaire complete If no, please explain pending

## 2022-05-10 NOTE — LETTER
5/10/2022       RE: Tommy Lerner  [address]  [place] [address]     Dear Colleague,    Thank you for referring your patient, Tommy Lerner, to the [facility] [place] [facility]. Please see a copy of my visit note below.      Name: Tommy Lerner    MRN: [id number]   YOB: 1960                 Chief Complaint:   Slow urinary stream         Assessment and Plan:   61 year old male with neuropathy of unknown etiology and LUTS characterized by urinary hesitancy, slow stream, and frequency. We discussed possible etiologies for his symptoms including outlet obstruction (BPH, pelvic floor tension, less likely urethral stricture) versus detrusor hypocontractility. He does have a family history of BPH though prostate is only very mildly enlarged on JONNA today. Additionally, his PSA is low so low suspicion for prostate malignancy. He is extremely active and is an avid cyclist, [employer] skier, etc., so there is a good chance his LUTS are secondary to pelvic floor tension. We discussed management options to include referral to pelvic floor physical therapy, trial of alpha blocker therapy, or further evaluation with urodynamics +/- cystoscopy. Given his neuropathy, shared decision making was made to proceed with UDS to more clearly delineate the etiology of his symptoms. If evidence for outlet obstruction, consider referral to PFPT vs. alpha blocker vs. cystoscopy to further evaluate the bladder outlet.   -VUDS next available.  -Bladder diaries beforehand.    Further recommendations pending UDS findings.    Caroline Lindquist PA-C  May 11, 2022          History of Present Illness:   Tommy Lerner is a 61 year old male seen in consultation from Dr. Ash for evaluation of slow urinary stream. Jaime notes a significant change in his voiding over the last year with slowing of his stream and urinary hesitancy. He also  suspects that he is not emptying his bladder and has some urinary frequency and urgency as a result. He denies urinary incontinence, dysuria, gross hematuria, history of UTI. He can sit or stand to void without appreciable difference in the strength of his stream.    Notably, he is an avid cyclist, cross country skier, and  so is quite active. Recently, he has had some issues with neuropathy. Reports that his neurologist thinks it's from stress related to his recent divorce. He does not have any history of DM or other diagnosed neurologic conditions. Chart review indicates that he has seen neurosurgery for a large right posterior fossa arachnoid cyst with mass effect on lower cranial nerves/brainsteam, though it was felt unlikely that this was contributing to his clinical neuropathy symptoms.    His father had what sounds like BPH and underwent TURP or similar procedure. No known family history of prostate cancer.          Past Medical History:     Past Medical History:   Diagnosis Date     Chronic cough     Due to dairy and soy allergies.            Past Surgical History:     Past Surgical History:   Procedure Laterality Date     vasectomy              Social History:     Social History     Tobacco Use     Smoking status: Never Smoker     Smokeless tobacco: Former User     Types: Chew     Quit date: 6/24/1994   Substance Use Topics     Alcohol use: Yes     Comment: 7-14 drinks per week            Family History:     Family History   Problem Relation Age of Onset     Pancreatic Cancer Maternal Grandmother      Hyperlipidemia Father      Other - See Comments Father         idiopathic pulmonary fibrosis     Idiopathic pulmonary fibrosis Father      Other - See Comments Other         Etoh dependence, dad and brother     Diabetes Other         m [facility], type I     Other - See Comments Mother 80        memory loss     Sleep Apnea Mother             Allergies:     Allergies   Allergen Reactions      "Lactase-Lactobacillus Cough     Soy Allergy             Medications:     Current Outpatient Medications   Medication Sig     albuterol (PROAIR HFA/PROVENTIL HFA/VENTOLIN HFA) 108 (90 Base) MCG/ACT inhaler Inhale 2 puffs into the lungs every 4 hours as needed for shortness of breath / dyspnea or wheezing     sildenafil (VIAGRA) 100 MG tablet Take 1 tablet (100 mg) by mouth daily as needed (ED) Take 30min- 4 hrs before intercourse.No use with nitroglycerin, terazosin or doxazosin.     Vitamin D, Cholecalciferol, 25 MCG (1000 UT) CAPS      No current facility-administered medications for this visit.             Review of Systems:    ROS: 14 point ROS neg other than the symptoms noted above in the HPI and PMH.          Physical Exam:   B/P: 146/91, T: Data Unavailable, P: Data Unavailable, R: Data Unavailable  Estimated body mass index is 23.57 kg/m  as calculated from the following:    Height as of 2/27/22: 1.803 m (5' 11\").    Weight as of 4/28/22: 76.7 kg (169 lb).  General: age-appropriate appearing male in NAD.  HEENT: Head AT/NC, EOMI, CN Grossly intact  Resp: no respiratory distress.  : deferred.  Rectal exam: good sphincter tone, smooth rectal mucosa. Prostate is mildly enlarged without nodules, mass, asymmetry, or tenderness.   Neuro: grossly intact  Motor: excellent strength throughout  Skin: clear of rashes or ecchymoses.        Labs:      Lab Results   Component Value Date    PSA 1.26 04/28/2022    PSA 1.49 07/02/2021    PSA 1.02 02/08/2019    PSA 0.96 06/24/2015       Creatinine   Date Value Ref Range Status   04/28/2022 1.04 0.66 - 1.25 mg/dL Final   07/02/2021 1.0 0.8 - 1.5 mg/dL Final       Color Urine (no units)   Date Value   04/28/2022 Dark Yellow (A)   12/26/2019 Yellow     Appearance Urine (no units)   Date Value   04/28/2022 Clear   12/26/2019 Clear     Glucose Urine (mg/dL)   Date Value   04/28/2022 Negative   12/26/2019 Negative     Bilirubin Urine (no units)   Date Value   04/28/2022 Negative "   12/26/2019 Negative     Ketones Urine (mg/dL)   Date Value   04/28/2022 Negative   12/26/2019 Negative     Specific Gravity Urine (no units)   Date Value   04/28/2022 1.020   12/26/2019 1.023     pH Urine   Date Value   04/28/2022 6.0   12/26/2019 6.0 pH     Protein Albumin Urine (mg/dL)   Date Value   04/28/2022 Negative   12/26/2019 Negative     Urobilinogen Urine (E.U./dL)   Date Value   04/28/2022 0.2     Nitrite Urine (no units)   Date Value   04/28/2022 Negative   12/26/2019 Negative     Leukocyte Esterase Urine (no units)   Date Value   04/28/2022 Negative   12/26/2019 Negative           Imaging:    Ultrasound scrotum 7/2/2021     Findings:   The left testicle measures 3.1 x 1.9 x 4.2 cm. The left epididymis  measures 0.8 x 0.6 x 1.0 cm. There is homogeneous normal echotexture  throughout the testis, no evidence of mass or abnormal calcifications.  Doppler evaluation shows normal arterial waveforms with the testis.  There is no hydrocele. Varicocele is noted lateral to the left  testicle measuring up to 4 mm.     The right testicle measures 3.2 x 2.5 x 4.7 cm. The right epididymis  measures 1.1 x 0.7 x 1.1 cm. There is homogeneous normal echotexture  throughout the testis, no evidence of mass or abnormal calcifications.  Doppler evaluation shows normal arterial waveforms with the testis.  There is no hydrocele or varicocele.       To the right of the testicle there is a 0.4 x 0.5 x 0.6 hypoechoic  lesion that correlates with area of patient's symptoms. This area  appears to be contiguous with superficial vasculature in right the  pelvic floor.                                                                      Impression:   1. Left varicocele.  2. Prominent superficial vasculature of the right pelvic floor most  likely correlating to what patient describes as mass.     40 minutes spent on the date of the encounter doing chart review, review of test results, interpretation of tests, patient visit and  documentation

## 2022-05-18 ENCOUNTER — ALLIED HEALTH/NURSE VISIT (OUTPATIENT)
Dept: UROLOGY | Facility: CLINIC | Age: 62
End: 2022-05-18
Payer: COMMERCIAL

## 2022-05-18 VITALS
SYSTOLIC BLOOD PRESSURE: 127 MMHG | HEIGHT: 71 IN | DIASTOLIC BLOOD PRESSURE: 85 MMHG | BODY MASS INDEX: 23.24 KG/M2 | WEIGHT: 166 LBS | HEART RATE: 78 BPM

## 2022-05-18 DIAGNOSIS — R35.0 INCREASED FREQUENCY OF URINATION: Primary | ICD-10-CM

## 2022-05-18 DIAGNOSIS — R39.198 SLOWING OF URINARY STREAM: ICD-10-CM

## 2022-05-18 DIAGNOSIS — R39.11 URINARY HESITANCY: ICD-10-CM

## 2022-05-18 LAB
ALBUMIN UR-MCNC: NEGATIVE MG/DL
APPEARANCE UR: CLEAR
BILIRUB UR QL STRIP: NEGATIVE
COLOR UR AUTO: YELLOW
GLUCOSE UR STRIP-MCNC: NEGATIVE MG/DL
HGB UR QL STRIP: ABNORMAL
KETONES UR STRIP-MCNC: NEGATIVE MG/DL
LEUKOCYTE ESTERASE UR QL STRIP: NEGATIVE
NITRATE UR QL: NEGATIVE
PH UR STRIP: 5.5 [PH] (ref 5–8)
SP GR UR STRIP: 1.02 (ref 1–1.03)
UROBILINOGEN UR STRIP-ACNC: 0.2 E.U./DL

## 2022-05-18 PROCEDURE — 74455 X-RAY URETHRA/BLADDER: CPT | Performed by: PHYSICIAN ASSISTANT

## 2022-05-18 PROCEDURE — 51797 INTRAABDOMINAL PRESSURE TEST: CPT | Performed by: PHYSICIAN ASSISTANT

## 2022-05-18 PROCEDURE — 51728 CYSTOMETROGRAM W/VP: CPT | Performed by: PHYSICIAN ASSISTANT

## 2022-05-18 PROCEDURE — 51784 ANAL/URINARY MUSCLE STUDY: CPT | Performed by: PHYSICIAN ASSISTANT

## 2022-05-18 PROCEDURE — 51741 ELECTRO-UROFLOWMETRY FIRST: CPT | Performed by: PHYSICIAN ASSISTANT

## 2022-05-18 PROCEDURE — 81003 URINALYSIS AUTO W/O SCOPE: CPT | Performed by: PATHOLOGY

## 2022-05-18 PROCEDURE — 81003 URINALYSIS AUTO W/O SCOPE: CPT | Performed by: PHYSICIAN ASSISTANT

## 2022-05-18 PROCEDURE — 51600 INJECTION FOR BLADDER X-RAY: CPT | Performed by: PHYSICIAN ASSISTANT

## 2022-05-18 ASSESSMENT — PAIN SCALES - GENERAL: PAINLEVEL: NO PAIN (0)

## 2022-05-18 NOTE — PROGRESS NOTES
PREPROCEDURE DIAGNOSES:    1. Lower urinary tract symptoms (hesitancy, slow stream, frequency)  2. Neuropathy of unknown etiology    POSTPROCEDURE DIAGNOSES:  -Maximum cystometric capacity 360 mL with normal filling sensations.  -Good bladder compliance without detrusor overactivity or incontinence.  -Maximum detrusor contraction during voiding reaches 80 cm H2O, which he supplements with intermittent abdominal straining. He voids 345 mL with reduced flow (Qmax 7.4 ml/s), prolonged flow curve, quiet EMG activity, and good bladder emptying (PVR ~10-15 ml).  -High pressure/low flow voiding pattern suggestive for bladder outlet obstruction (BOOI of 52.1 also supports this).  -Fluoroscopy reveals a mildly trabeculated bladder wall without diverticuli or VUR. The bladder neck is closed during filling and open during voiding with possible narrowing within the prostatic urethra.  -PLAN: evidence for outlet obstruction, though exact etiology remains unclear. Differentials include BPH, urethral stricture, high tone pelvic floor dysfunction (patient is an avid cyclist and athlete). Discussed management options to include referral to PFPT, trial of alpha blocker therapy, or further evaluation with cystoscopy. He prefers to avoid empiric treatments and elects for cystoscopy to further evaluate the bladder outlet.     PROCEDURE:    1. Uroflowmetry.  2. Sterile urethral catheterization for measurement of postvoid residual urine volume.  3. Complex filling cystometrogram with measurement of bladder and rectal pressures.  4. Complex voiding cystometrogram with measurement of bladder and rectal pressures.  5. Electromyography of the pelvic floor during urodynamics.  6. Fluoroscopic imaging of the bladder during urodynamics, at least 3 views.    7. Interpretation of urodynamics and flouroscopic imaging.      INDICATIONS FOR PROCEDURE:  Mr. Tommy Lerner is a pleasant 61 year old male with lower urinary tract symptoms  characterized by hesitancy, slow stream, and frequency. He also has neuropathy of unknown etiology. Baseline video urodynamic assessment is requested today to better characterize Mr. Tommy Lerner's voiding dysfunction.      VOIDING DIARY:  Voids every 2-3 hours during the day, nocturia x 1-2.  Episodes of incontinence: none.  Incontinence associated with: N/A.  Total Volume Intake: 8528-4918 mL; mostly water, 16 oz of coffee in the AM, 32 oz on beer on one day  Total Volume Output: 5584-1325 mL; average voided volume 155 mL, largest voided volume 300 mL.    DESCRIPTION OF PROCEDURE:  Risks, benefits, and alternatives to urodynamics were discussed with the patient and he wished to proceed.  Urodynamics are planned to better assess the primary etiology for Mr. Lerner's urologic dysfunction.  The patient does not currently take anticholinergic or beta 3 agonist medications for his bladder.  After informed consent was obtained, the patient was taken to the procedure room where uroflowmetry was performed. Findings below.     PRE-STUDY UROFLOWMETRY:  Voided volume: 43 mL.  Maximum flow rate: 5.9 mL/sec.  Average flow rate: 3.1 mL/sec.  Character of the curve: continuous.  Postvoid residual by catheter: 30 mL.  Pretest urine dipstick was negative for leukocytes and nitrites.    Next a 7F double-lumen urodynamics catheter was inserted into the bladder under sterile technique via urethra.  A 7F abdominal manometry catheter was placed in the rectum.  EMG pads were placed on both sides of the anal verge.  The bladder was filled with 200 mL of Iohexol at 40 mL/minute and serial pressures were recorded.  With coughing there was an appropriate rise in vesical and abdominal pressures with no change in detrusor pressure, confirming good study catheter placement.    DURING THE FILLING PHASE:  First sensation: 180 mL.  First Desire: 201 mL.  Strong Desire: 240 mL.  Maximum Capacity: 320 mL.    Uninhibited detrusor  contractions: none.  Compliance: good. PDet=7.7 cmH20 at capacity. Compliance ratio of 41.  Continence: no DOI or JOEL.  EMG: concordant during filling.    DURING THE VOIDING PHASE:  Maximum detrusor contraction with void: 80 cm of H2O pressure, which he augments with intermittent abdominal straining.  Voided volume: 345 mL.  Maximum flow rate: 7.4 mL/sec.  Average flow rate: 3.1 mL/sec.  Postvoid Residual: ~10-15 mL.  EMG activity: quiet.  Character of voiding curve: prolonged.  BOOI: 52.1 (suggesting obstruction - see key below)  [obstructed (DEVI index [BOOI] ? 40); equivocal (no definite   obstruction; BOOI 20-40); and no obstruction (BOOI ? 20)]    FLUOROSCOPIC IMAGING OF THE BLADDER DURING URODYNAMICS:  Please note, image numbers on UDS tracings correlate with iSite series numbers on PACS images. Fluoroscopy during today's procedure demonstrated a mildly trabeculated bladder wall without diverticulae or cellules.  No vesicoureteral reflux was observed.  The bladder neck was closed during filling and open during voiding.  After voiding to completion, all catheters were removed and the patient was brought back into the consultation room to further discuss today's study results.      ASSESSMENT/PLAN:  Mr. Tommy Lerner is a pleasant 61 year old male with LUTS who demonstrated the following findings today on urodynamic evaluation:    -Maximum cystometric capacity 360 mL with normal filling sensations.  -Good bladder compliance without detrusor overactivity or incontinence.  -Maximum detrusor contraction during voiding reaches 80 cm H2O, which he supplements with intermittent abdominal straining. He voids 345 mL with reduced flow (Qmax 7.4 ml/s), prolonged flow curve, quiet EMG activity, and good bladder emptying (PVR ~10-15 ml).  -High pressure/low flow voiding pattern suggestive for bladder outlet obstruction (BOOI of 52.1 also supports this).  -Fluoroscopy reveals a mildly trabeculated bladder wall without  diverticuli or VUR. The bladder neck is closed during filling and open during voiding with possible narrowing within the prostatic urethra.  -PLAN: evidence for outlet obstruction, though exact etiology remains unclear. Differentials include BPH, urethral stricture, high tone pelvic floor dysfunction (patient is an avid cyclist and athlete). Discussed management options to include referral to PFPT, trial of alpha blocker therapy, or further evaluation with cystoscopy. He prefers to avoid empiric treatments and elects for cystoscopy to further evaluate the bladder outlet.     - Given presumed normal genitourinary anatomy without other identifiable risk factors, antibiotic prophylaxis was not performed per department protocol. The risk of UTI with VUDS is low at ~2.5-3%.      Thank you for allowing me to participate in the care of Mr. Tommy Lerner and please don't hesitate to contact me with any questions or concerns.      Caroline Lindquist PA-C  Urology Physician Assistant

## 2022-05-18 NOTE — PATIENT INSTRUCTIONS
UROLOGY CLINIC VISIT PATIENT INSTRUCTIONS    Follow up with Dr. Steiner or Dr. Robles for cystoscopy next available.     CYSTOSCOPY    What is a Cystoscopy?  This is a procedure done to check for problems inside the bladder.  Problems may include polyps (growths), tumors, inflammation (swelling and redness) and other concerns.    The doctor inserts a thin tube (called a cystoscope) into the bladder.  The tube is about the size of a pencil.  We will give you numbing medicine to reduce the pain or discomfort you may feel.    The tube allows the doctor to:  The doctor will be able to see inside the bladder by filling the bladder with water.  The water makes it easier to see any problems that may be present.    If needed, the doctor may use the tube to:  The doctor is able to take tissue samples (biopsies).  Samples are sent to the lab for testing.  The doctor can also burn off any small growths or tumors that are found.  This is call fulguration.    What happens after the exam?  You may go back to your normal diet and activity as you feel ready, unless your doctor tells you not to.    For the next two days, you may notice:  Some blood in your urine.  Some burning when you urinate (use the toilet).  An urge to urinate more often.  Bladder spasms.    These are normal after the procedure. They should go away on their own after a day or two.      You can help to relieve the above listed symptoms by:  Drinking 6 to 8 large glasses of water each day (includes drinks at meals).  This will help clear the urine.  Take warm baths to relieve pain and bladder spasms.  Do not add anything to the bath water.  Your doctor may prescribe pain medicine.  You may also take Tylenol (acetaminophen) for pain.    When should I call my doctor?  A fever over 100.0 F (38 C) for more than a day.  (Before you call the doctor, check your temperature under your tongue.)  Chills.  Failure to urinate: No urine comes out when you try to use the  toilet.  (Try soaking in a bathtub full of warm water.  If still no urine, call your doctor.)  A lot of blood in the urine or blood clots larger than a nickel.  Pain in the back or abdomen (belly / stomach area).  Pain or spasms that are not relieved by warm tub baths and pain medicine.  Severe pain, burning or other problems while passing urine.  Pain that gets worse after two days.      If you have any issues, questions or concerns in the meantime, do not hesitate to contact us at [phone] or via [employer].     It was a pleasure meeting with you today.  Thank you for allowing me and my team the privilege of caring for you today.  YOU are the reason we are here, and I truly hope we provided you with the excellent service you deserve.  Please let us know if there is anything else we can do for you so that we can be sure you are leaving completely satisfied with your care experience.

## 2022-05-18 NOTE — NURSING NOTE
"  Chief Complaint   Patient presents with     Urinary Retention     Urinary frequency       Blood pressure 127/85, pulse 78, height 1.803 m (5' 11\"), weight 75.3 kg (166 lb). Body mass index is 23.15 kg/m .    Patient Active Problem List   Diagnosis     WALDEMAR (obstructive sleep apnea)     Anosmia     Neuropathy       Allergies   Allergen Reactions     Lactase-Lactobacillus Cough     Soy Allergy        Current Outpatient Medications   Medication Sig Dispense Refill     albuterol (PROAIR HFA/PROVENTIL HFA/VENTOLIN HFA) 108 (90 Base) MCG/ACT inhaler Inhale 2 puffs into the lungs every 4 hours as needed for shortness of breath / dyspnea or wheezing 18 g 3     sildenafil (VIAGRA) 100 MG tablet Take 1 tablet (100 mg) by mouth daily as needed (ED) Take 30min- 4 hrs before intercourse.No use with nitroglycerin, terazosin or doxazosin. 30 tablet 5     Vitamin D, Cholecalciferol, 25 MCG (1000 UT) CAPS          Social History     Tobacco Use     Smoking status: Never Smoker     Smokeless tobacco: Former User     Types: Chew     Quit date: 1994   Substance Use Topics     Alcohol use: Yes     Comment: 7-14 drinks per week     Drug use: No       Invasive Procedure Safety Checklist:    Procedure: Urodynamics    Action: Complete sections and checkboxes as appropriate.  Pre-procedure:  1. Patient ID Verified with 2 identifiers (Ele and  or MRN) : YES    2. Procedure and site verified with patient/designee (when able) : YES    3. Accurate consent documentation in medical record : YES    4. H&P (or appropriate assessment) documented in medical record : N/A  H&P must be up to 30 days prior to procedure an updated within 24 hours of Procedure as applicable.     5. Relevant diagnostic and radiology test results appropriately labeled and displayed as applicable : YES    6. Blood products, implants, devices, and/or special equipment available for the procedure as applicable : YES    7. Procedure site(s) marked with provider initials " [Exclusions: none] : NO    8. Marking not required. Reason : Yes  Procedure does not require site marking    Time Out:     Time-Out performed immediately prior to starting procedure, including verbal and active participation of all team members addressing: YES    1. Correct patient identity.  2. Confirmed that the correct side and site are marked.  3. An accurate procedure to be done.  4. Agreement on the procedure to be done.  5. Correct patient position.  6. Relevant images and results are properly labeled and appropriately displayed.  7. The need to administer antibiotics or fluids for irrigation purposes during the procedure as applicable.  8. Safety precautions based on patient history or medication use.    During Procedure: Verification of correct person, site, and procedure occurs any time the responsibility for care of the patient is transferred to another member of the care team.        The following medication was given:     MEDICATION:  Omnipaque (Iohexol Injection) (240mgI/mL)  ROUTE: Provider Administered  SITE: Provider Administered via catheter  DOSE: 200mL  LOT #: [id number]  : [employer]  EXPIRATION DATE: 4/8/2024  NDC#: [address]-94   Was there drug waste? No          Betzy Butcher [facility]  5/18/2022  9:03 AM

## 2022-05-25 ENCOUNTER — OFFICE VISIT (OUTPATIENT)
Dept: NEUROLOGY | Facility: CLINIC | Age: 62
End: 2022-05-25
Payer: COMMERCIAL

## 2022-05-25 ENCOUNTER — LAB (OUTPATIENT)
Dept: LAB | Facility: CLINIC | Age: 62
End: 2022-05-25

## 2022-05-25 ENCOUNTER — PRE VISIT (OUTPATIENT)
Dept: NEUROLOGY | Facility: CLINIC | Age: 62
End: 2022-05-25

## 2022-05-25 VITALS
OXYGEN SATURATION: 96 % | TEMPERATURE: 98.1 F | RESPIRATION RATE: 16 BRPM | WEIGHT: 166 LBS | SYSTOLIC BLOOD PRESSURE: 141 MMHG | HEIGHT: 71 IN | DIASTOLIC BLOOD PRESSURE: 92 MMHG | HEART RATE: 71 BPM | BODY MASS INDEX: 23.24 KG/M2

## 2022-05-25 DIAGNOSIS — R26.81 GAIT INSTABILITY: ICD-10-CM

## 2022-05-25 DIAGNOSIS — R26.81 GAIT INSTABILITY: Primary | ICD-10-CM

## 2022-05-25 LAB
CK SERPL-CCNC: 63 U/L (ref 30–300)
RHEUMATOID FACT SER NEPH-ACNC: <6 IU/ML
VIT B12 SERPL-MCNC: 315 PG/ML (ref 193–986)

## 2022-05-25 PROCEDURE — 36415 COLL VENOUS BLD VENIPUNCTURE: CPT | Performed by: PATHOLOGY

## 2022-05-25 PROCEDURE — 99204 OFFICE O/P NEW MOD 45 MIN: CPT | Mod: GC | Performed by: PSYCHIATRY & NEUROLOGY

## 2022-05-25 PROCEDURE — 86038 ANTINUCLEAR ANTIBODIES: CPT | Performed by: PSYCHIATRY & NEUROLOGY

## 2022-05-25 PROCEDURE — 82607 VITAMIN B-12: CPT | Performed by: PATHOLOGY

## 2022-05-25 PROCEDURE — 82550 ASSAY OF CK (CPK): CPT | Performed by: PATHOLOGY

## 2022-05-25 PROCEDURE — 86431 RHEUMATOID FACTOR QUANT: CPT | Performed by: PSYCHIATRY & NEUROLOGY

## 2022-05-25 NOTE — PROGRESS NOTES
"Chief Complaint: worsening balance and shaking    History of Present Illness:    Tommy Lerner is a 61 year old man previously followed by Dr. Ramirez for evaluation of worsening balance and shaking. 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 Over the last several years he has noticed some fine motor shaking of his hands, left worse than right, and also some in his legs. He has noticed fasciuclations of his leg muscles when doing squats. Has noticed worsening of his balance especially in the morning, but when he gets going on the bike it improves. Notices myalgias (quads) and \"sense of disconnectedness\"  for 15-20 minutes of exercise (not with walking, etc), that then goes away and he can \"bike 70 miles without difficulty.\" Describes the disconnectedness as his brain not talking to his feet correctly. Believes these symptoms all started approx 2.5 years ago and have been slowly progressive. All of his symptoms are worse in the morning and significantly improve throughout the day. Unsure if he has outright or focal weakness. Denies myoglobinuria or rhabdomyolysis. Has always been very athletic (). As significant fasciculations of his quads with weight bearing.      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 Would have positional numbness of his left arm. Was also under a lot of stress at the time, slowly improving most recently. Has numbness of his tongue as well, that will come and go throughout the day.      He has been followed by Neurosurgery because of a fairly large prepontine cyst and this is unchanged on a recent MRI and the plan is to observe him. Has noticed occasionally sweating (clamy at night). Has occasional lightheadedness, unsure how often. He has not experienced changes in syncope, or early satiety. Has noticed changes in bladder function (less full bladder " sensation - urology work-up ongoing) and has new constipation (never had in his life). No issues with pelvic numbness or erection function. He denies frequent cramps or difficulty with muscle stiffness and muscle relaxation. Speech and swallowing are normal. Appetite is good and weight is relatively stable. He denies breathing difficulties or shortness of breath while lying flat. Mood and affect are appropriate. He is independent, has no assistive devices, able to ambulate independently, and is not falling. His sleep is ok (wakes up to go to the bathroom) or restless leg symptoms.     Prior pertinent laboratory work-up:  2021: Neg/normal: HbA1c (5.3), lipids  2020: Neg/normal: RICKEY panel, [place], MMA, B12 (318), TSH,   2019:Neg/normal:  CK, Hep C, acetylcholine receptor antibodies     Prior pertinent radiology work-up:  6/20: MRI C-spine w/wo contrast showed no abnormal enhancement in the spinal cord, thecal sac or cervical vertebrae.2. Mild multilevel cervical spondylosis without significant spinal canal stenosis (C3-7).    Prior electrophysiologic work-up:  2020: Nerve conduction studies showed bilateral sural antidromic sensory NCSs showed mildly attenuated SNAP amplitudes. The sural to radial SNAP amplitude ratio was 0.13 (normal is >0.21). The left deep peroneal and bilateral tibial motor NCSs were normal    Past Medical History:   Past Medical History:   Diagnosis Date     Chronic cough     Due to dairy and soy allergies.       Past Surgical History:  Past Surgical History:   Procedure Laterality Date     vasectomy       Family history:    There is no known family history of hereditary neuropathies.  Son (30Ys) and father (old age) with familiar tremor (shake all the time - can't do fine motor)    Social History:    Daily alcohol (1-2 beer/wine). Uses cannabis edibles for sleep. He denies tobacco. There is no known exposure to toxins or heavy metals.     Medical Allergies:     Allergies   Allergen Reactions      "Lactase-Lactobacillus Cough     Soy Allergy      Current Medications:    Current Outpatient Medications   Medication     albuterol (PROAIR HFA/PROVENTIL HFA/VENTOLIN HFA) 108 (90 Base) MCG/ACT inhaler     sildenafil (VIAGRA) 100 MG tablet     Vitamin D, Cholecalciferol, 25 MCG (1000 UT) CAPS     No current facility-administered medications for this visit.     Review of Systems: A complete review of systems was obtained and was negative except for what was noted above.    Physical examination:    BP (!) 141/92   Pulse 71   Temp 98.1  F (36.7  C)   Resp 16   Ht 1.803 m (5' 11\")   Wt 75.3 kg (166 lb)   SpO2 96%   BMI 23.15 kg/m       General Appearance: NAD    Skin: There are no rashes or other skin lesions.    Musculoskeletal:  There is no scoliosis, lordosis, kyphosis, pes cavus, or hammertoes.    Neurologic examination:    Mental status:  Patient is alert, attentive, and oriented x 3.  Language is coherent and fluent without aphasia.  Memory, comprehension and ability to follow commands were intact.       Cranial nerves:  Extraocular movements were full. There was no face, jaw, palate or tongue weakness or atrophy. Hearing was grossly intact.  Shoulder shrug was normal.       Motor exam: fasciculations of quads bilaterally with weight bearing only. No atrophy.  Manual muscle testing revealed MRC grade 5/5 strength throughout including proximal and distal muscles of the arms and legs. He is able to easily stand from a seated position without use of his arms.    Complex motor skills: Very fine low amplitude high frequency termor of his left hand, no ataxia    Sensory exam revealed decreased vibratory perception in the toes bilaterally. Pinprick and temperature were decreased to the toes bilaterally.  Light touch was normal.  Romberg sign was absent.    Gait: Narrow and stable.  He was able to walk on his heels, toes and tandem without any difficulty.       Deep tendon reflexes:   Right Left   Triceps 2 2 "   Biceps 2 2   Brachioradialis 2 2   Knee jerk 2 2   Ankle jerk 2 2   Plantar responses were flexor bilaterally.       Assessment:    Tommy Lerner is a 61 year old man with a history of a mild idiopathic length-dependent neuropathy who reports shaking, myalgias, and disconnectedness of uncertain cause. He is a high level athlete and these symptoms are disconcerting to him. Fortunately his exam is largely unremarkable, which is overall reassuring. He does not have significant quad or finger flexor weakness that would be suggestive of an inflammatory myopathy and no evidence that would suggest a more degenerative process, like motor neuron disease. He has a known history of familiar tremor, but his examination is more suggestive of an enhanced physiologic tremor at this time. He also notices worsening balance, which could be related to a progression of his previously diagnosed neuropathy. Would like to further evaluate his muscle and nerve function with an EMG, especially to see if there has been progression since his last study two years ago. Additionally, will check labs as below for myalgias. Of note, he has significant daily exercise which may impact his CK level (biking and weight lifting). Will discuss results of labs/EMG with patient at EMG study. Can follow-up on an as needed basis dependent on symptoms.     Plan:      1. Labs: CK, B12, RICKEY, RF  2. Nerve conduction studies/EMG: Evaluate for changes in neuropathy. Also look for myopathy and NMD (very low suspicion for these)  3. If symptoms persist/worsen and no explanation on NCS/EMG then dizzy/balance clinic may be beneficial for him  4. Follow up as needed    Seen and discussed with Dr. Grey. His addendum follows    Cecilia Cochran MD  Neuromuscular Fellow  ---  ADDENDUM:  I personally interviewed and examined the patient. I agree with the history, physical, assessment, and plan as documented above. Changes to the physical examination, assessment and plan  have been incorporated into the note by myself, as to make it a single cohesive document.    Javier Grey MD      5/25/22: Negative/normal B12, CK, RICKEY, CK    9/7/22: NCS/EMG showed a mild axonal, length-dependant sensory polyneuropathy. These findings are similar to what was reported on the 2/11/2020 study. Superimposed upon the sensory polyneuropathy are chronic bilateral lumbosacral radiculopathies affecting the L4 and L5 nerve roots. The absence of active denervation changes argues against a recent nerve or nerve root injury. We discussed results at the time of the study. Presently he has no back pain. Should he develops back pain or new areas of weakness will obtain MRI LS spine - but no indication at this time. He agrees with plan.

## 2022-05-25 NOTE — NURSING NOTE
Chief Complaint   Patient presents with     New Patient     New- F/U neuropathy     Stanislaw Vaughan

## 2022-05-25 NOTE — LETTER
"5/25/2022       RE: Tommy Lerner  [address]  [place] [address]     Dear Colleague,    Thank you for referring your patient, Tommy Lerner, to the [facility] NEUROLOGY [facility]IN[facility] [place] at [facility]. Please see a copy of my visit note below.    Chief Complaint: worsening balance and shaking    History of Present Illness:    Tommy Lerner is a 61 year old man previously followed by Dr. Ramirez for evaluation of worsening balance and shaking. 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 Over the last several years he has noticed some fine motor shaking of his hands, left worse than right, and also some in his legs. He has noticed fasciuclations of his leg muscles when doing squats. Has noticed worsening of his balance especially in the morning, but when he gets going on the bike it improves. Notices myalgias (quads) and \"sense of disconnectedness\"  for 15-20 minutes of exercise (not with walking, etc), that then goes away and he can \"bike 70 miles without difficulty.\" Describes the disconnectedness as his brain not talking to his feet correctly. Believes these symptoms all started approx 2.5 years ago and have been slowly progressive. All of his symptoms are worse in the morning and significantly improve throughout the day. Unsure if he has outright or focal weakness. Denies myoglobinuria or rhabdomyolysis. Has always been very athletic (). As significant fasciculations of his quads with weight bearing.      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 Would have positional numbness of his left arm. Was also under a lot of stress at the time, slowly improving most recently. Has numbness of his tongue as well, that will come and go throughout the day.      He has been followed by Neurosurgery because of a " fairly large prepontine cyst and this is unchanged on a recent MRI and the plan is to observe him. Has noticed occasionally sweating (clamy at night). Has occasional lightheadedness, unsure how often. He has not experienced changes in syncope, or early satiety. Has noticed changes in bladder function (less full bladder sensation - urology work-up ongoing) and has new constipation (never had in his life). No issues with pelvic numbness or erection function. He denies frequent cramps or difficulty with muscle stiffness and muscle relaxation. Speech and swallowing are normal. Appetite is good and weight is relatively stable. He denies breathing difficulties or shortness of breath while lying flat. Mood and affect are appropriate. He is independent, has no assistive devices, able to ambulate independently, and is not falling. His sleep is ok (wakes up to go to the bathroom) or restless leg symptoms.     Prior pertinent laboratory work-up:  2021: Neg/normal: HbA1c (5.3), lipids  2020: Neg/normal: RICKEY panel, [place], MMA, B12 (318), TSH,   2019:Neg/normal:  CK, Hep C, acetylcholine receptor antibodies     Prior pertinent radiology work-up:  6/20: MRI C-spine w/wo contrast showed no abnormal enhancement in the spinal cord, thecal sac or cervical vertebrae.2. Mild multilevel cervical spondylosis without significant spinal canal stenosis (C3-7).    Prior electrophysiologic work-up:  2020: Nerve conduction studies showed bilateral sural antidromic sensory NCSs showed mildly attenuated SNAP amplitudes. The sural to radial SNAP amplitude ratio was 0.13 (normal is >0.21). The left deep peroneal and bilateral tibial motor NCSs were normal    Past Medical History:   Past Medical History:   Diagnosis Date     Chronic cough     Due to dairy and soy allergies.       Past Surgical History:  Past Surgical History:   Procedure Laterality Date     vasectomy       Family history:    There is no known family history of hereditary  "neuropathies.  Son (30Ys) and father (old age) with familiar tremor (shake all the time - can't do fine motor)    Social History:    Daily alcohol (1-2 beer/wine). Uses cannabis edibles for sleep. He denies tobacco. There is no known exposure to toxins or heavy metals.     Medical Allergies:     Allergies   Allergen Reactions     Lactase-Lactobacillus Cough     Soy Allergy      Current Medications:    Current Outpatient Medications   Medication     albuterol (PROAIR HFA/PROVENTIL HFA/VENTOLIN HFA) 108 (90 Base) MCG/ACT inhaler     sildenafil (VIAGRA) 100 MG tablet     Vitamin D, Cholecalciferol, 25 MCG (1000 UT) CAPS     No current facility-administered medications for this visit.     Review of Systems: A complete review of systems was obtained and was negative except for what was noted above.    Physical examination:    BP (!) 141/92   Pulse 71   Temp 98.1  F (36.7  C)   Resp 16   Ht 1.803 m (5' 11\")   Wt 75.3 kg (166 lb)   SpO2 96%   BMI 23.15 kg/m       General Appearance: NAD    Skin: There are no rashes or other skin lesions.    Musculoskeletal:  There is no scoliosis, lordosis, kyphosis, pes cavus, or hammertoes.    Neurologic examination:    Mental status:  Patient is alert, attentive, and oriented x 3.  Language is coherent and fluent without aphasia.  Memory, comprehension and ability to follow commands were intact.       Cranial nerves:  Extraocular movements were full. There was no face, jaw, palate or tongue weakness or atrophy. Hearing was grossly intact.  Shoulder shrug was normal.       Motor exam: fasciculations of quads bilaterally with weight bearing only. No atrophy.  Manual muscle testing revealed MRC grade 5/5 strength throughout including proximal and distal muscles of the arms and legs. He is able to easily stand from a seated position without use of his arms.    Complex motor skills: Very fine low amplitude high frequency termor of his left hand, no ataxia    Sensory exam revealed " decreased vibratory perception in the toes bilaterally. Pinprick and temperature were decreased to the toes bilaterally.  Light touch was normal.  Romberg sign was absent.    Gait: Narrow and stable.  He was able to walk on his heels, toes and tandem without any difficulty.       Deep tendon reflexes:   Right Left   Triceps 2 2   Biceps 2 2   Brachioradialis 2 2   Knee jerk 2 2   Ankle jerk 2 2   Plantar responses were flexor bilaterally.       Assessment:    Tommy Lerner is a 61 year old man with a history of a mild idiopathic length-dependent neuropathy who reports shaking, myalgias, and disconnectedness of uncertain cause. He is a high level athlete and these symptoms are disconcerting to him. Fortunately his exam is largely unremarkable, which is overall reassuring. He does not have significant quad or finger flexor weakness that would be suggestive of an inflammatory myopathy and no evidence that would suggest a more degenerative process, like motor neuron disease. He has a known history of familiar tremor, but his examination is more suggestive of an enhanced physiologic tremor at this time. He also notices worsening balance, which could be related to a progression of his previously diagnosed neuropathy. Would like to further evaluate his muscle and nerve function with an EMG, especially to see if there has been progression since his last study two years ago. Additionally, will check labs as below for myalgias. Of note, he has significant daily exercise which may impact his CK level (biking and weight lifting). Will discuss results of labs/EMG with patient at EMG study. Can follow-up on an as needed basis dependent on symptoms.     Plan:      1. Labs: CK, B12, RICKEY, RF  2. Nerve conduction studies/EMG: Evaluate for changes in neuropathy. Also look for myopathy and NMD (very low suspicion for these)  3. If symptoms persist/worsen and no explanation on NCS/EMG then dizzy/balance clinic may be beneficial for  him  4. Follow up as needed    Seen and discussed with Dr. Grey. His addendum follows    Cecilia Cochran MD  Neuromuscular Fellow  ---  ADDENDUM:  I personally interviewed and examined the patient. I agree with the history, physical, assessment, and plan as documented above. Changes to the physical examination, assessment and plan have been incorporated into the note by myself, as to make it a single cohesive document.    Javier Grey MD      5/25/22: Negative/normal B12, CK, RICKEY, CK

## 2022-05-26 LAB — ANA SER QL IF: NEGATIVE

## 2022-06-20 ENCOUNTER — PRE VISIT (OUTPATIENT)
Dept: UROLOGY | Facility: CLINIC | Age: 62
End: 2022-06-20
Payer: COMMERCIAL

## 2022-06-20 NOTE — TELEPHONE ENCOUNTER
Reason for visit: Cystoscopy     Dx/Hx/Sx: BPH w/ LUTS    Records/imaging/labs/orders: In EPIC    At Rooming: paper YESENIA

## 2022-06-28 ENCOUNTER — OFFICE VISIT (OUTPATIENT)
Dept: UROLOGY | Facility: CLINIC | Age: 62
End: 2022-06-28
Payer: COMMERCIAL

## 2022-06-28 VITALS
HEIGHT: 71 IN | SYSTOLIC BLOOD PRESSURE: 138 MMHG | HEART RATE: 73 BPM | WEIGHT: 164 LBS | DIASTOLIC BLOOD PRESSURE: 85 MMHG | BODY MASS INDEX: 22.96 KG/M2

## 2022-06-28 DIAGNOSIS — R39.11 URINARY HESITANCY: ICD-10-CM

## 2022-06-28 DIAGNOSIS — R35.0 INCREASED FREQUENCY OF URINATION: Primary | ICD-10-CM

## 2022-06-28 PROCEDURE — 52000 CYSTOURETHROSCOPY: CPT | Performed by: UROLOGY

## 2022-06-28 RX ORDER — LIDOCAINE HYDROCHLORIDE 20 MG/ML
JELLY TOPICAL ONCE
Status: COMPLETED | OUTPATIENT
Start: 2022-06-28 | End: 2022-06-28

## 2022-06-28 RX ADMIN — LIDOCAINE HYDROCHLORIDE: 20 JELLY TOPICAL at 13:44

## 2022-06-28 ASSESSMENT — PAIN SCALES - GENERAL: PAINLEVEL: NO PAIN (0)

## 2022-06-28 NOTE — NURSING NOTE
"Chief Complaint   Patient presents with    Cystoscopy       Blood pressure 138/85, pulse 73, height 1.803 m (5' 11\"), weight 74.4 kg (164 lb). Body mass index is 22.87 kg/m .    Patient Active Problem List   Diagnosis    WALDEMAR (obstructive sleep apnea)    Anosmia    Neuropathy       Allergies   Allergen Reactions    Lactase-Lactobacillus Cough    Soy Allergy        Current Outpatient Medications   Medication Sig Dispense Refill    albuterol (PROAIR HFA/PROVENTIL HFA/VENTOLIN HFA) 108 (90 Base) MCG/ACT inhaler Inhale 2 puffs into the lungs every 4 hours as needed for shortness of breath / dyspnea or wheezing 18 g 3    sildenafil (VIAGRA) 100 MG tablet Take 1 tablet (100 mg) by mouth daily as needed (ED) Take 30min- 4 hrs before intercourse.No use with nitroglycerin, terazosin or doxazosin. 30 tablet 5    Vitamin D, Cholecalciferol, 25 MCG (1000 UT) CAPS          Social History     Tobacco Use    Smoking status: Never Smoker    Smokeless tobacco: Former User     Types: Chew     Quit date: 1994   Substance Use Topics    Alcohol use: Yes     Comment: 7-14 drinks per week    Drug use: No       Invasive Procedure Safety Checklist:    Procedure: Cystoscopy    Action: Complete sections and checkboxes as appropriate.    Pre-procedure:  1. Patient ID Verified with 2 identifiers (Ele and  or MRN) : YES    2. Procedure and site verified with patient/designee (when able) : YES    3. Accurate consent documentation in medical record : YES    4. H&P (or appropriate assessment) documented in medical record : N/A  H&P must be up to 30 days prior to procedure an updated within 24 hours of                 Procedure as applicable.     5. Relevant diagnostic and radiology test results appropriately labeled and displayed as applicable : YES    6. Blood products, implants, devices, and/or special equipment available for the procedure as applicable : YES    7. Procedure site(s) marked with provider initials [Exclusions: none] : " NO    8. Marking not required. Reason : Yes  Procedure does not require site marking    Time Out:     Time-Out performed immediately prior to starting procedure, including verbal and active participation of all team members addressing: YES    1. Correct patient identity.  2. Confirmed that the correct side and site are marked.  3. An accurate procedure to be done.  4. Agreement on the procedure to be done.  5. Correct patient position.  6. Relevant images and results are properly labeled and appropriately displayed.  7. The need to administer antibiotics or fluids for irrigation purposes during the procedure as applicable.  8. Safety precautions based on patient history or medication use.    During Procedure: Verification of correct person, site, and procedure occurs any time the responsibility for care of the patient is transferred to another member of the care team.    The following medication was given:     MEDICATION:  Lidocaine without epinephrine 2% jelly  ROUTE: urethral   SITE: urethral   DOSE: 10 mL  LOT #: [id number]  : [employer]  EXPIRATION DATE: 11-23  NDC#: [address]-0   Was there drug waste? No    Prior to med admin, verified patient identity using patient's name and date of birth.  Due to med administration, patient instructed to remain in clinic for 15 minutes  afterwards, and to report any adverse reaction to me immediately.    Drug Amount Wasted:  None.  Vial/Syringe: Syringe      Umm Borrego  6/28/2022  1:27 PM

## 2022-06-28 NOTE — LETTER
6/28/2022       RE: Tommy Lerner  [address]  [place] [address]     Dear Colleague,    Thank you for referring your patient, Tommy Lerner, to the [facility] UROLOGY CLINIC [place] at [facility]. Please see a copy of my visit note below.    CYSTOSCOPY PROCEDURE NOTE    Reason for cystoscopy: LUTS  Brief History:62 yo M with LUTS, high active person, has been concerned about progressive symptoms and possible need for treatment of LUTS>  He has some neuropathy which was unclear if contributing and also recent VUDS that were equivocal for obstruction vs. Pelvic floor dysfunction.    AUA SS today 13/2    CYSTOSCOPY  After obtaining informed consent, the patient was prepped and draped in the standard sterile fashion.  The 15 Turkish flexible cystoscope was inserted through the urethral meatus.      The anterior urethra was:  normal without stricture.    The external sphincter was  appropriately coapted.   The prostatic urethra demonstrated mild bilobar hypertrophy.    The bladder neck was  nonocclusive.    The bladder was  unremarkable for tumors, erythema or stones.    The ureteral orifices  were identified on each side in orthotopic position with efflux of clear urine.   There were minimal trabeculations.    On retroflexion there was the usual bladder neck hyperemia.    There was minimal intravesical protrusion of the prostate.      The patient tolerated the procedure well without complication.      Assessment/Plan:  -Mild BPH on cysto  -Suspect possible pelvic floor dysfunction, reviewed PFPT vs. MIST or TUIP, shared decision made to proceed with trial of PFPT    IPatel saw and evaluated this patient and agree with the plan as stated above.  I personally performed all listed procedures.

## 2022-07-24 NOTE — PROGRESS NOTES
CYSTOSCOPY PROCEDURE NOTE    Reason for cystoscopy: LUTS  Brief History:62 yo M with LUTS, high active person, has been concerned about progressive symptoms and possible need for treatment of LUTS>  He has some neuropathy which was unclear if contributing and also recent VUDS that were equivocal for obstruction vs. Pelvic floor dysfunction.    AUA SS today 13/2    CYSTOSCOPY  After obtaining informed consent, the patient was prepped and draped in the standard sterile fashion.  The 15 Mauritanian flexible cystoscope was inserted through the urethral meatus.      The anterior urethra was:  normal without stricture.    The external sphincter was  appropriately coapted.   The prostatic urethra demonstrated mild bilobar hypertrophy.    The bladder neck was  nonocclusive.    The bladder was  unremarkable for tumors, erythema or stones.    The ureteral orifices  were identified on each side in orthotopic position with efflux of clear urine.   There were minimal trabeculations.    On retroflexion there was the usual bladder neck hyperemia.    There was minimal intravesical protrusion of the prostate.      The patient tolerated the procedure well without complication.      Assessment/Plan:  -Mild BPH on cysto  -Suspect possible pelvic floor dysfunction, reviewed PFPT vs. MIST or TUIP, shared decision made to proceed with trial of PFPT    IPatel saw and evaluated this patient and agree with the plan as stated above.  I personally performed all listed procedures.

## 2022-08-04 ENCOUNTER — THERAPY VISIT (OUTPATIENT)
Dept: PHYSICAL THERAPY | Facility: CLINIC | Age: 62
End: 2022-08-04
Attending: UROLOGY
Payer: COMMERCIAL

## 2022-08-04 DIAGNOSIS — R35.0 INCREASED FREQUENCY OF URINATION: ICD-10-CM

## 2022-08-04 DIAGNOSIS — R39.11 URINARY HESITANCY: ICD-10-CM

## 2022-08-04 PROCEDURE — 97161 PT EVAL LOW COMPLEX 20 MIN: CPT | Mod: GP | Performed by: PHYSICAL THERAPIST

## 2022-08-04 PROCEDURE — 97112 NEUROMUSCULAR REEDUCATION: CPT | Mod: GP | Performed by: PHYSICAL THERAPIST

## 2022-08-04 PROCEDURE — 97530 THERAPEUTIC ACTIVITIES: CPT | Mod: GP | Performed by: PHYSICAL THERAPIST

## 2022-08-04 NOTE — PROGRESS NOTES
Physical Therapy Initial Evaluation  Subjective:  The history is provided by the patient. No  was used.   Patient Health History  Tommy Lerner being seen for tight pelvis, began about 1 year ago.     Date of Onset: 6/28/22 date of order.   Problem occurred: no idea   Pain is reported as 0/10 on pain scale.  General health as reported by patient is excellent.  Pertinent medical history includes: none.   Red flags:  None as reported by patient.             Current occupation is .   Primary job tasks include:  Computer work.                  Therapist Assessment:   Clinical Impression: Pt presents with primary complaint of urinary frequency and urinary hesitancy.  Per clinical examination, pt with slight overactivity of pelvic floor muscles in standing.  Pt will benefit from skilled physical therapy for down training of pelvic floor muscles.    Chief Complaint:  The pt notes that his urinary stream hs slowed over the past year. The pt notes that he stays pretty relaxed when he urinates. The pt notes no difference with stream of urine sitting or standing. The pt reports some difficulty initiating the stream of urine at times.     Current activity: very active bike rider    Goals: to have a steadier stronger stream of urine     Urination   Do you leak on the way to the bathroom or with a strong urge to void? no  Do you leak with cough, sneeze, jumping, running? no  Any other activities that cause leaking? no  Do you have triggers that make you feel you can't wait to go to the bathroom? no What are they? n/a  How long can you delay the need to urinate? Not answered  How many times do you get up to urinate at night? 1-2x  How many times do you urinate during the day? More in AM when having caffeine  Can you stop the flow of urine when on the toilet? yes  Is the volume of urine passed usually: small  Do you strain to pass urine? no  Do you have a slow or hesitant urinary stream?  yes  Do you have difficulty initiating the urine stream? yes  How many bladder infections have you had in the last 12 months? 0  What is you fluid intake per day? Water (8oz) 5  Caffeine 2  Alcohol 1  Do you feel like you empty completely when voiding? Sometimes he feels as though he does not urinate- he will have to go again about 10 minutes later and will void an average amount    Bowel Habits  Frequency of bowel movements? 1-2x a day  Consistency of stool? Stuyvesant stool scale? Type 4 sometimes skinny  Do you ignore the urge to defecate? no  Do you strain to pass stool? no  Do you feel that you empty completely? mostly    Pelvic Pain  When do you have pelvic pain? NO  Any abdominal or pelvic surgeries? no  Are you having regular exercise? yes  Have you practiced the PF (kegel) exercise for 4 or more weeks? no    Objective:    EXTERNAL ASSESSMENT:  Skin condition:normal  Bearing down/coughing:not tested  Muscle contraction/perineal mobility: elevation and urogenital triangle descent  Palpation: no tenderness to palpation of adductors, ischiocavernosus, bulbocavernosus, and levator ani bilaterally       SEMG BIOFEEDBACK  Surface Electrode Placement:   Perianal: Levator ani     Baseline EMG PM:   resting tone: supine: 1.8uV, seated: 2.5uV, standing: 3.5uV      Simulating pushing like having a bowel movement: no paradoxical PFM  contraction, good relaxation of PFM at 1.8uV       Assessment/Plan:    Patient is a 61 year old male with pelvic complaints.    Patient has the following significant findings with corresponding treatment plan.                Diagnosis 1:  Pelvic floor dysfunction  Decreased ROM/flexibility - manual therapy, therapeutic exercise, therapeutic activity and home program  Impaired muscle performance - biofeedback, electric stimulation, neuro re-education and home program  Decreased function - therapeutic activities, home program and functional performance testing    Therapy Evaluation Codes:    Cumulative Therapy Evaluation is: Low complexity.    Previous and current functional limitations:  (See Goal Flow Sheet for this information)    Short term and Long term goals: (See Goal Flow Sheet for this information)     Communication ability:  Patient appears to be able to clearly communicate and understand verbal and written communication and follow directions correctly.  Treatment Explanation - The following has been discussed with the patient:   RX ordered/plan of care  Anticipated outcomes  Possible risks and side effects  This patient would benefit from PT intervention to resume normal activities.   Rehab potential is good.    Frequency:  2 X a month, once daily  Duration:  for 3 months  Discharge Plan:  Achieve all LTG.  Independent in home treatment program.  Reach maximal therapeutic benefit.    Please refer to the daily flowsheet for treatment today, total treatment time and time spent performing 1:1 timed codes.

## 2022-08-05 ENCOUNTER — NURSE TRIAGE (OUTPATIENT)
Dept: FAMILY MEDICINE | Facility: CLINIC | Age: 62
End: 2022-08-05

## 2022-08-05 NOTE — TELEPHONE ENCOUNTER
"    Reason for Disposition    [1] Fever AND [2] no signs of serious infection or localizing symptoms (all other triage questions negative)    Answer Assessment - Initial Assessment Questions  1. TEMPERATURE: \"What is the most recent temperature?\"  \"How was it measured?\"       99.1 this morning, as high as 103 on Tuesday 8/2.  2. ONSET: \"When did the fever start?\"       Tuesday, 8/2  3. CHILLS: \"Do you have chills?\" If yes: \"How bad are they?\"  (e.g., none, mild, moderate, severe)    - NONE: no chills    - MILD: feeling cold    - MODERATE: feeling very cold, some shivering (feels better under a thick blanket)    - SEVERE: feeling extremely cold with shaking chills (general body shaking, rigors; even under a thick blanket)       Yes, moderate  4. OTHER SYMPTOMS: \"Do you have any other symptoms besides the fever?\"  (e.g., abdomen pain, cough, diarrhea, earache, headache, sore throat, urination pain)      Mild intermittent cough, pt had Covid PCR test negative two days ago.  5. CAUSE: If there are no symptoms, ask: \"What do you think is causing the fever?\"       Pt is unsure, he was recently at a friend's cabin in [place] where [place]. He thinks it is possible he might have had some water to drink from the faucet that was untreated but denies any other GI symptoms.  6. CONTACTS: \"Does anyone else in the family have an infection?\"      No  7. TREATMENT: \"What have you done so far to treat this fever?\" (e.g., medications)      Ibuprofen and rest  8. IMMUNOCOMPROMISE: \"Do you have of the following: diabetes, HIV positive, splenectomy, cancer chemotherapy, chronic steroid treatment, transplant patient, etc.\"      No    Protocols used: FEVER-A-    Instructed pt to continue to monitor symptoms and treat with ibuprofen or acetaminophen as needed. Emphasized importance of staying well-hydrated. Pt will call back for any worsening symptoms. If fever persists through next week then pt will call back to " schedule appt with Dr. Ash for further work up.    Fidel MAI, RN  08/05/22 9:32 AM

## 2022-08-10 DIAGNOSIS — G62.9 NEUROPATHY: Primary | ICD-10-CM

## 2022-08-26 ENCOUNTER — PRE VISIT (OUTPATIENT)
Dept: UROLOGY | Facility: CLINIC | Age: 62
End: 2022-08-26

## 2022-08-27 ENCOUNTER — HEALTH MAINTENANCE LETTER (OUTPATIENT)
Age: 62
End: 2022-08-27

## 2022-09-07 ENCOUNTER — OFFICE VISIT (OUTPATIENT)
Dept: NEUROLOGY | Facility: CLINIC | Age: 62
End: 2022-09-07
Payer: COMMERCIAL

## 2022-09-07 DIAGNOSIS — G62.9 NEUROPATHY: ICD-10-CM

## 2022-09-07 DIAGNOSIS — M54.16 LUMBAR RADICULOPATHY: Primary | ICD-10-CM

## 2022-09-07 PROCEDURE — 95885 MUSC TST DONE W/NERV TST LIM: CPT | Mod: 59 | Performed by: PSYCHIATRY & NEUROLOGY

## 2022-09-07 PROCEDURE — 95912 NRV CNDJ TEST 11-12 STUDIES: CPT | Performed by: PSYCHIATRY & NEUROLOGY

## 2022-09-07 PROCEDURE — 95886 MUSC TEST DONE W/N TEST COMP: CPT | Performed by: PSYCHIATRY & NEUROLOGY

## 2022-09-07 NOTE — LETTER
2022     RE: Tommy Lerner  [address]  [place] [address]     Dear Colleague,    Thank you for referring your patient, Tommy Lerner, to the [facility] [place] at [facility]. Please see a copy of my visit note below.                        [facility]  Electrodiagnostic Laboratory                 Department of Neurology                                                                                                         Test Date:  2022    Patient: Jaime Lerner : 1960 Physician: Javier Grey MD   Sex: Male AGE: 62 year Ref Phys:    ID#: [id number]   Technician: Kristy Behling     Clinical Information:  61 year old man with worsening balance and sensory changes in feet. Evaluate for polyneuropathy and compare to prior study dated 2020.     Techniques:  Motor and sensory conduction studies were done with surface recording electrodes. EMG was done with a concentric needle electrode.     Results:  Nerve conduction studies:   1. Bilateral sural and superficial peroneal sensory responses show borderline reduced amplitudes and normal CV.   2. Left radial sensory response is normal.   3. Right peroneal-EDB motor response shows normal DL, reduced amplitude (compared to the left) and mildly slowed CV.   4. Left peroneal-EDB, bilateral peroenal-TA and bilateral tibial-AH motor responses are normal.     Needle EM. No abnormal spontaneous activity was seen in the sampled muscles.   2. Large amplitude and/or long duration motor unit potentials (MUP) were seen in the bilateral VL, bilateral TA, right PL muscles. MUPs with increased polyphasia were also seen in the right glut med muscle.   3. Recruitment patterns were normal.     Interpretation:  This is an abnormal study. There is electrophysiologic evidence of a mild axonal, length-dependant sensory polyneuropathy. These findings are similar to what  was reported on the 2/11/2020 study. Superimposed upon the sensory polyneuropathy are chronic bilateral lumbosacral radiculopathies affecting the L4 and L5 nerve roots. The absence of active denervation changes argues against a recent nerve or nerve root injury. Clinical correlation is recommended.     Javier Grey MD  Department of Neurology      Nerve Conduction Studies  Motor Sites      Latency Amplitude Neg. Amp Diff Segment Distance Velocity Neg. Dur Neg Area Diff Temperature Comment   Site (ms) Norm (mV) Norm %  cm m/s Norm ms %  C    Left Dp Branch Fibular (TA) Motor   Fib Head 2.8  < 6.0 4.4 -      11.5  -    Right Dp Branch Fibular (TA) Motor   Fib Head 3.1  < 6.0 4.8 -      15.8  -    Pop Fossa 4.9  < 5.7 3.7 - -22.9 Pop Fossa-Fib Head 8 44 - 15.0 -17.6 -    Left Fibular (EDB) Motor   Ankle 4.4  < 6.0 5.1  > 2.0  Ankle-EDB 8   5.8      Bel Fib Head 13.0 - 4.7 - -7.8 Bel Fib Head-Ankle 35 41  > 38 6.9 -2.2     Pop Fossa 15.0 - 4.5 - -4.3 Pop Fossa-Bel Fib Head 8 40  > 38 7.4 -1.10     Right Fibular (EDB) Motor   Ankle 4.0  < 6.0 2.8  > 2.0  Ankle-EDB 8   6.9      Bel Fib Head 15.0 - 1.59 - -43.2 Bel Fib Head-Ankle 33 30  > 38 7.4 -40.0     Pop Fossa 17.6 - 1.61 - 1.26 Pop Fossa-Bel Fib Head 8 31  > 38 6.5 9.3     Left Tibial (AHB) Motor   Ankle 3.0  < 6.5 9.1  > 4.4  Ankle-AHB 8   7.7      Knee 13.9 - 5.5 - -39.6 Knee-Ankle 46 42  > 38 6.5 -38.9 32.1    Right Tibial (AHB) Motor   Ankle 3.6  < 6.5 8.7  > 4.4  Ankle-AHB 8   5.9  32.1      Sensory Sites      Onset Lat Peak Lat Amp (O-P) Amp (P-P) Segment Distance Velocity Temperature Comment   Site ms ms  V Norm  V  cm m/s Norm  C    Left Radial Sensory   Forearm-Wrist 1.83 2.4 15  > 15 18 Forearm-Wrist 10 55 - 31.9    Left Superficial Fibular Sensory   14 cm-Ankle 3.1 3.8 2  > 3 6 14 cm-Ankle 12.5 40  > 38 30.8    Right Superficial Fibular Sensory   14 cm-Ankle 2.4 3.0 2  > 3 2 14 cm-Ankle 12.5 52  > 38 32.1    Left Sural Sensory   Calf-Lat Mall 2.6 3.4 5  >  5 8 Calf-Lat Mall 14 54  > 38 32.4    Right Sural Sensory   Calf-Lat Mall 3.4 4.5 5  > 5 3 Calf-Lat Mall 14 41  > 38 32.1      F Wave Studies     Min-F Max-F Dispersion Persistence Mean-F F-Norm L-R Mean-F L-R Mean-F Norm F/M Ratio F-M Lat (ms)   Left Tibial (Abd Hallucis)  32  C   60.00 62.03 2.03 100.00 61.30 <61  <5.7 5.83 54.84       Electromyography     Side Muscle Ins Act Fibs/PSW Fasc HF Amp Dur Poly Recrt Int Pat   Left Vastus Lat Nml None Nml 0 2+ Nml 0 Nml Nml   Left Tib Anterior Nml None Nml 0 1+ 1+ 0 Nml Nml   Left Gastroc Nml None Nml 0 Nml Nml 0 Nml Nml   Right Vastus Lat Nml None Nml 0 1+ Nml 1+ Nml Nml   Right Tib Anterior Nml None Nml 0 1+ Nml 1+ Nml Nml   Right Gastroc Nml None Nml 0 Nml Nml 0 Nml Nml   Right Fib Longus Nml None Nml 0 Nml 1+ 1+ Nml Nml   Right Gluteus Med Nml None Nml 0 Nml Nml 2+ Nml Nml   Right Lumbo Parasp (Lower) Nml None Nml 0              NCS Waveforms:    Motor                    Sensory                   Again, thank you for allowing me to participate in the care of your patient.      Sincerely,    Javier Grey MD

## 2022-09-07 NOTE — PROGRESS NOTES
[facility]  Electrodiagnostic Laboratory                 Department of Neurology                                                                                                         Test Date:  2022    Patient: Jaime Lerner : 1960 Physician: Javier Grey MD   Sex: Male AGE: 62 year Ref Phys:    ID#: [id number]   Technician: Kristy Behling     Clinical Information:  61 year old man with worsening balance and sensory changes in feet. Evaluate for polyneuropathy and compare to prior study dated 2020.     Techniques:  Motor and sensory conduction studies were done with surface recording electrodes. EMG was done with a concentric needle electrode.     Results:  Nerve conduction studies:   1. Bilateral sural and superficial peroneal sensory responses show borderline reduced amplitudes and normal CV.   2. Left radial sensory response is normal.   3. Right peroneal-EDB motor response shows normal DL, reduced amplitude (compared to the left) and mildly slowed CV.   4. Left peroneal-EDB, bilateral peroenal-TA and bilateral tibial-AH motor responses are normal.     Needle EM. No abnormal spontaneous activity was seen in the sampled muscles.   2. Large amplitude and/or long duration motor unit potentials (MUP) were seen in the bilateral VL, bilateral TA, right PL muscles. MUPs with increased polyphasia were also seen in the right glut med muscle.   3. Recruitment patterns were normal.     Interpretation:  This is an abnormal study. There is electrophysiologic evidence of a mild axonal, length-dependant sensory polyneuropathy. These findings are similar to what was reported on the 2020 study. Superimposed upon the sensory polyneuropathy are chronic bilateral lumbosacral radiculopathies affecting the L4 and L5 nerve roots. The absence of active denervation changes argues against a recent nerve or nerve root injury. Clinical correlation is recommended.     Javier  Que KHAN  Department of Neurology      Nerve Conduction Studies  Motor Sites      Latency Amplitude Neg. Amp Diff Segment Distance Velocity Neg. Dur Neg Area Diff Temperature Comment   Site (ms) Norm (mV) Norm %  cm m/s Norm ms %  C    Left Dp Branch Fibular (TA) Motor   Fib Head 2.8  < 6.0 4.4 -      11.5  -    Right Dp Branch Fibular (TA) Motor   Fib Head 3.1  < 6.0 4.8 -      15.8  -    Pop Fossa 4.9  < 5.7 3.7 - -22.9 Pop Fossa-Fib Head 8 44 - 15.0 -17.6 -    Left Fibular (EDB) Motor   Ankle 4.4  < 6.0 5.1  > 2.0  Ankle-EDB 8   5.8      Bel Fib Head 13.0 - 4.7 - -7.8 Bel Fib Head-Ankle 35 41  > 38 6.9 -2.2     Pop Fossa 15.0 - 4.5 - -4.3 Pop Fossa-Bel Fib Head 8 40  > 38 7.4 -1.10     Right Fibular (EDB) Motor   Ankle 4.0  < 6.0 2.8  > 2.0  Ankle-EDB 8   6.9      Bel Fib Head 15.0 - 1.59 - -43.2 Bel Fib Head-Ankle 33 30  > 38 7.4 -40.0     Pop Fossa 17.6 - 1.61 - 1.26 Pop Fossa-Bel Fib Head 8 31  > 38 6.5 9.3     Left Tibial (AHB) Motor   Ankle 3.0  < 6.5 9.1  > 4.4  Ankle-AHB 8   7.7      Knee 13.9 - 5.5 - -39.6 Knee-Ankle 46 42  > 38 6.5 -38.9 32.1    Right Tibial (AHB) Motor   Ankle 3.6  < 6.5 8.7  > 4.4  Ankle-AHB 8   5.9  32.1      Sensory Sites      Onset Lat Peak Lat Amp (O-P) Amp (P-P) Segment Distance Velocity Temperature Comment   Site ms ms  V Norm  V  cm m/s Norm  C    Left Radial Sensory   Forearm-Wrist 1.83 2.4 15  > 15 18 Forearm-Wrist 10 55 - 31.9    Left Superficial Fibular Sensory   14 cm-Ankle 3.1 3.8 2  > 3 6 14 cm-Ankle 12.5 40  > 38 30.8    Right Superficial Fibular Sensory   14 cm-Ankle 2.4 3.0 2  > 3 2 14 cm-Ankle 12.5 52  > 38 32.1    Left Sural Sensory   Calf-Lat Mall 2.6 3.4 5  > 5 8 Calf-Lat Mall 14 54  > 38 32.4    Right Sural Sensory   Calf-Lat Mall 3.4 4.5 5  > 5 3 Calf-Lat Mall 14 41  > 38 32.1      F Wave Studies     Min-F Max-F Dispersion Persistence Mean-F F-Norm L-R Mean-F L-R Mean-F Norm F/M Ratio F-M Lat (ms)   Left Tibial (Abd Hallucis)  32  C   60.00 62.03 2.03 100.00  61.30 <61  <5.7 5.83 54.84       Electromyography     Side Muscle Ins Act Fibs/PSW Fasc HF Amp Dur Poly Recrt Int Pat   Left Vastus Lat Nml None Nml 0 2+ Nml 0 Nml Nml   Left Tib Anterior Nml None Nml 0 1+ 1+ 0 Nml Nml   Left Gastroc Nml None Nml 0 Nml Nml 0 Nml Nml   Right Vastus Lat Nml None Nml 0 1+ Nml 1+ Nml Nml   Right Tib Anterior Nml None Nml 0 1+ Nml 1+ Nml Nml   Right Gastroc Nml None Nml 0 Nml Nml 0 Nml Nml   Right Fib Longus Nml None Nml 0 Nml 1+ 1+ Nml Nml   Right Gluteus Med Nml None Nml 0 Nml Nml 2+ Nml Nml   Right Lumbo Parasp (Lower) Nml None Nml 0              NCS Waveforms:    Motor                    Sensory

## 2022-09-13 ENCOUNTER — OFFICE VISIT (OUTPATIENT)
Dept: UROLOGY | Facility: CLINIC | Age: 62
End: 2022-09-13
Payer: COMMERCIAL

## 2022-09-13 VITALS
HEART RATE: 67 BPM | DIASTOLIC BLOOD PRESSURE: 82 MMHG | WEIGHT: 162.3 LBS | BODY MASS INDEX: 22.72 KG/M2 | SYSTOLIC BLOOD PRESSURE: 124 MMHG | HEIGHT: 71 IN

## 2022-09-13 DIAGNOSIS — R35.0 INCREASED FREQUENCY OF URINATION: ICD-10-CM

## 2022-09-13 DIAGNOSIS — R39.198 SLOWING OF URINARY STREAM: ICD-10-CM

## 2022-09-13 DIAGNOSIS — R39.11 URINARY HESITANCY: Primary | ICD-10-CM

## 2022-09-13 DIAGNOSIS — R39.15 URINARY URGENCY: ICD-10-CM

## 2022-09-13 PROCEDURE — 99213 OFFICE O/P EST LOW 20 MIN: CPT | Performed by: PHYSICIAN ASSISTANT

## 2022-09-13 ASSESSMENT — PAIN SCALES - GENERAL: PAINLEVEL: NO PAIN (0)

## 2022-09-13 NOTE — NURSING NOTE
"Chief Complaint   Patient presents with     Follow Up       Blood pressure 124/82, pulse 67, height 1.803 m (5' 11\"), weight 73.6 kg (162 lb 4.8 oz). Body mass index is 22.64 kg/m .    Patient Active Problem List   Diagnosis     WALDEMAR (obstructive sleep apnea)     Anosmia     Neuropathy       Allergies   Allergen Reactions     Lactase-Lactobacillus Cough     Soy Allergy        Current Outpatient Medications   Medication Sig Dispense Refill     albuterol (PROAIR HFA/PROVENTIL HFA/VENTOLIN HFA) 108 (90 Base) MCG/ACT inhaler Inhale 2 puffs into the lungs every 4 hours as needed for shortness of breath / dyspnea or wheezing 18 g 3     sildenafil (VIAGRA) 100 MG tablet Take 1 tablet (100 mg) by mouth daily as needed (ED) Take 30min- 4 hrs before intercourse.No use with nitroglycerin, terazosin or doxazosin. 30 tablet 5     Vitamin D, Cholecalciferol, 25 MCG (1000 UT) CAPS          Social History     Tobacco Use     Smoking status: Never Smoker     Smokeless tobacco: Former User     Types: Chew     Quit date: 6/24/1994   Substance Use Topics     Alcohol use: Yes     Comment: 7-14 drinks per week     Drug use: No       Tamanna Billy  9/13/2022  2:35 PM  "

## 2022-09-13 NOTE — PATIENT INSTRUCTIONS
UROLOGY CLINIC VISIT PATIENT INSTRUCTIONS    Below is a list of foods that can irritate the bladder.    Caffeinated soft drinks.  Coffee.  Tea.  Chocolate.  Tomato-based foods.  Acidic juices and fruits.  Alcohol.  Carbonated drinks.  Aspartame/Nutrasweet.      Bladder Retraining  In this packet, you will learn 3 steps to help you improve your bladder control. If you have any questions regarding any of these steps once you are home, please feel free to call the office.   The 3 steps you will learn are:   Double Voiding   Fluid Guidelines  Timed Voiding   Double Voiding   A technique called double voiding can be used to help ensure that you have fully emptied your bladder while on the toilet. The main idea behind double voiding is to try to void two or even three times during each trip to the bathroom.  By doing this you can reduce residual urine volumes and minimize your chance of having an accident, an infection, or leakage later on.   The technique is simple.  Some people find that they can void, remain on the toilet for a rest period of two to five to ten minutes, and void again.  Others find it useful to void, then stand up and sit back down and attempt to void again.   You can also compress the bladder in order to empty more fully.  To do the Crede maneuver, press firmly with one hand (or both hands) directly into the abdomen over the bladder.  You may also find it helpful to lean forward or rock while sitting on the toilet in order to help empty the bladder better.  Fluid Guidelines   Managing your fluid intake can also help you improve your bladder control.  It is VERY important that you drink at least 5 to 7 glasses of fluid each day. The bulk of this fluid should be water. Drinking appropriate amounts of fluid daily and emptying your bladder at regular intervals helps decrease bladder infections. Managing your problem by restricting fluid intake is counterproductive, and NOT recommended.     Suggestions  Regarding Fluid Intake  Try to spread your fluid consumption out over the course of the day rather than consuming large amounts at one sitting and then going long periods of time without drinking.   Try to minimize fluid consumption after your evening meal.   Try to minimize caffeine and alcohol consumption. Use only decaffeinated coffee, tea or soda when possible.      Timed Voiding    It might be a good idea to start this approach to managing your problem on a weekend or when you plan to be at home or near a bathroom facility. The purpose of timed voiding is to gradually:   increase the amount of time between emptying your bladder   increase the amount of fluid your bladder can hold, and hopefully,   diminish the sense of urgency and/or leakage associated with your problem.     Week 1 - After awakening, empty your bladder every half-hour on the hour (even if you do NOT feel the need to go). Make sure you are drinking frequently. During the night only go to the bathroom if you waken and find it necessary     Week 2 - Increase the time between emptying your bladder to once per hour, following the above fluid and night instructions.     Week 3 - Increase the time between emptying your bladder to every 1 1/2 hours, following the above fluid and night instructions.   Week 4 - Increase the time between emptying your bladder to every 2 hours, following the above fluid and night instructions.  Work up to voiding every 3 to 3   hours if you can.     If you can already hold your bladder longer than 1/2 hour, you do not need to start at Week 1. Start at the point that is appropriate for you and work up from there. Just remember to 1) increase your voiding intervals by no more than 30 minutes at a time, 2) void regularly even if you do not feel the need to go, and 3) during the night, only go to the bathroom if you waken and find it necessary.   You will be the best  of how quickly you can advance to the next step. These  instructions are an outline which you can modify (for example, you may find it more comfortable to stretch from 1 to 1 1/4 hours).   You may also increase the pace of this sample schedule, depending on your individual symptoms and bladder capacity. For example, you may increase the hourly increments every 5 days, instead of every 7 days.     Don t Get Discouraged  This program works! The keys to success are self-motivation and gradual increases in the time interval between voids. If you try to progress too rapidly, you will exceed your capabilities and become frustrated.    Some Additional Behavioral Techniques to Help Bladder Control  Do not rush to the bathroom. Try to be calm and maintain control. Rushing to the bathroom can intensify the urge for the bladder to contract.  Do several quick contractions of the pelvic floor muscles. Use effort to keep from leaking. If possible, sit down for direct pressure on the pelvic floor.  Relax. If you have practiced diaphragmatic breathing in the past, use that skill to relax. (Take slow, deep breaths through your nose, and then slowly breathe out through pursed lips.) Use distraction techniques to try and make the urinary urge go away.  When you feel the urge subside, walk slowly and normally to the bathroom. You can repeat the above steps to gain control if the urge returns.  You can slowly proceed to the toilet room to empty your bladder once the urge has subsided.      Call, send a [employer] message, or follow up if you would ever like to discuss additional treatment options like medication or surgery.     If you have any issues, questions or concerns in the meantime, do not hesitate to contact us at [phone] or via [employer].     It was a pleasure meeting with you today.  Thank you for allowing me and my team the privilege of caring for you today.  YOU are the reason we are here, and I truly hope we provided you with the excellent service you deserve.  Please let us  know if there is anything else we can do for you so that we can be sure you are leaving completely satisfied with your care experience.

## 2022-09-13 NOTE — LETTER
9/13/2022       RE: Tommy Lerner  [address]  [place] [address]     Dear Colleague,    Thank you for referring your patient, Tommy Lerner, to the [facility] [place] at [facility]. Please see a copy of my visit note below.    Urology Office Visit - Follow Up    Reason for Visit: symptom check after PT    HPI: Tommy Lerner is a 62 year old male with a history neuropathy of unknown etiology and mixed LUTS characterized by urinary hesitancy, slow stream, frequency, and urgency. Urodynamics on 5/18/22 demonstrated the following:     -Maximum cystometric capacity 360 mL with normal filling sensations.  -Good bladder compliance without detrusor overactivity or incontinence.  -Maximum detrusor contraction during voiding reaches 80 cm H2O, which he supplements with intermittent abdominal straining. He voids 345 mL with reduced flow (Qmax 7.4 ml/s), prolonged flow curve, quiet EMG activity, and good bladder emptying (PVR ~10-15 ml).  -High pressure/low flow voiding pattern suggestive for bladder outlet obstruction (BOOI of 52.1 also supports this).  -Fluoroscopy reveals a mildly trabeculated bladder wall without diverticuli or VUR. The bladder neck is closed during filling and open during voiding with possible narrowing within the prostatic urethra.      Cystoscopy on 6/28/22 demonstrated mild bilobar prostatic hypertrophy but was otherwise normal. Treatment options including pelvic floor PT vs MIST or TUIP were discussed and he was ultimately referred to PFPT.    TODAY   9/13/22:  Attended one session of PT. Told he was able to relax his pelvic floor relatively well. Recommended to follow up for additional treatments but he has decided not to return as he does not believe it will be of further benefit.  Continues to have urinary hesitancy, slow stream. Now noticing more urgency as he is thinking more about it. No urge  "incontinence. Symptoms not overly bothersome and are manageable at this time.    PEx  /82   Pulse 67   Ht 1.803 m (5' 11\")   Wt 73.6 kg (162 lb 4.8 oz)   BMI 22.64 kg/m    GENERAL: Healthy, alert and no distress  EYES: Eyes grossly normal to inspection.  No discharge or erythema, or obvious scleral/conjunctival abnormalities.  RESP: No audible wheeze, cough, or visible cyanosis.  No visible retractions or increased work of breathing.    SKIN: Visible skin clear. No significant rash, abnormal pigmentation or lesions.  NEURO: Cranial nerves grossly intact.  Mentation and speech appropriate for age.  PSYCH: Mentation appears normal, affect normal/bright, judgement and insight intact, normal speech and appearance well-groomed.      LABS:  PSA   Date Value Ref Range Status   07/02/2021 1.49 0 - 4 ug/L Final     Comment:     Assay Method:  Chemiluminescence using Siemens Vista analyzer     PSA Tumor Marker   Date Value Ref Range Status   04/28/2022 1.26 0.00 - 4.00 ug/L Final       Creatinine   Date Value Ref Range Status   04/28/2022 1.04 0.66 - 1.25 mg/dL Final   07/02/2021 1.0 0.8 - 1.5 mg/dL Final       Color Urine (no units)   Date Value   04/28/2022 Dark Yellow (A)   12/26/2019 Yellow     COLOR, URINE POCT (no units)   Date Value   05/18/2022 Yellow     Appearance Urine (no units)   Date Value   04/28/2022 Clear   12/26/2019 Clear     CLARITY, URINE POCT (no units)   Date Value   05/18/2022 Clear     Glucose Urine (mg/dL)   Date Value   04/28/2022 Negative   12/26/2019 Negative     GLUCOSE, URINE POCT (mg/dL)   Date Value   05/18/2022 Negative     Bilirubin Urine (no units)   Date Value   04/28/2022 Negative   12/26/2019 Negative     BILIRUBIN, URINE POCT (no units)   Date Value   05/18/2022 Negative     Ketones Urine (mg/dL)   Date Value   04/28/2022 Negative   12/26/2019 Negative     KETONES, URINE POCT (mg/dL)   Date Value   05/18/2022 Negative     Specific Gravity Urine (no units)   Date Value "   04/28/2022 1.020   12/26/2019 1.023     SPECIFIC GRAVITY POCT (no units)   Date Value   05/18/2022 1.020     pH Urine   Date Value   04/28/2022 6.0   12/26/2019 6.0 pH     PH, URINE POCT (no units)   Date Value   05/18/2022 5.5     Protein Albumin Urine (mg/dL)   Date Value   04/28/2022 Negative   12/26/2019 Negative     PROTEIN, URINE POCT (mg/dL)   Date Value   05/18/2022 Negative     Urobilinogen Urine (E.U./dL)   Date Value   04/28/2022 0.2     UROBILINOGEN, URINE POCT (E.U./dL)   Date Value   05/18/2022 0.2     Nitrite Urine (no units)   Date Value   04/28/2022 Negative   12/26/2019 Negative     NITRITES POCT (no units)   Date Value   05/18/2022 Negative     Leukocyte Esterase Urine (no units)   Date Value   04/28/2022 Negative   12/26/2019 Negative     LEUK ESTERASE, POCT (no units)   Date Value   05/18/2022 Negative         ASSESSMENT/PLAN:  62 year old male with mixed LUTS with urodynamic findings of possible bladder outlet obstruction. Cystoscopy with mild BPH, otherwise unremarkable. Given avid cycling and active lifestyle, pelvic floor tension/dysfunction was suspected and he was referred to PFPT. Attended one session and does not plan to follow up as he does not feel it will be of further benefit. We discussed additional treatment options to include observation, behavioral/dietary modifications, pharmacotherapy (alpha blocker for BPH, anticholinergics or beta 3 agonists for OAB), or possible surgery. At this time, he is not overly bothered by his symptoms and elects to proceed as follows:   -Limit bladder irritants.   -Bladder retraining techniques.   -Follow up as needed.       Caroline Lindquist PA-C  Department of Urology      38 minutes spent on the date of the encounter doing chart review, review of test results, interpretation of tests, patient visit and documentation        Again, thank you for allowing me to participate in the care of your patient.      Sincerely,    Caroline Lindquist PA-C

## 2022-10-21 ENCOUNTER — MYC MEDICAL ADVICE (OUTPATIENT)
Dept: FAMILY MEDICINE | Facility: CLINIC | Age: 62
End: 2022-10-21

## 2022-10-21 DIAGNOSIS — N52.9 ERECTILE DYSFUNCTION, UNSPECIFIED ERECTILE DYSFUNCTION TYPE: ICD-10-CM

## 2022-10-21 RX ORDER — SILDENAFIL 100 MG/1
100 TABLET, FILM COATED ORAL DAILY PRN
Qty: 30 TABLET | Refills: 5 | Status: SHIPPED | OUTPATIENT
Start: 2022-10-21

## 2022-10-21 NOTE — TELEPHONE ENCOUNTER
Medication requested: sildenafil (VIAGRA) 100 MG tablet  Last office visit: 4/28/22  [facility] appointments: none  Medication last refilled: 8/7/20; 30 + 5 refills  Last qualifying labs: N/A    Prescription approved per [facility] Refill Protocol.    Fidel MAI, RN  10/21/22 2:52 PM

## 2022-12-26 ENCOUNTER — HEALTH MAINTENANCE LETTER (OUTPATIENT)
Age: 62
End: 2022-12-26

## 2023-04-15 ENCOUNTER — TELEPHONE (OUTPATIENT)
Dept: NURSING | Facility: CLINIC | Age: 63
End: 2023-04-15
Payer: COMMERCIAL

## 2023-04-15 ENCOUNTER — HOSPITAL ENCOUNTER (EMERGENCY)
Facility: CLINIC | Age: 63
Discharge: HOME | End: 2023-04-15
Attending: EMERGENCY MEDICINE | Admitting: EMERGENCY MEDICINE
Payer: COMMERCIAL

## 2023-04-15 VITALS
SYSTOLIC BLOOD PRESSURE: 142 MMHG | DIASTOLIC BLOOD PRESSURE: 89 MMHG | RESPIRATION RATE: 16 BRPM | OXYGEN SATURATION: 98 % | HEART RATE: 79 BPM | TEMPERATURE: 99.1 F

## 2023-04-15 DIAGNOSIS — N30.00 ACUTE CYSTITIS WITHOUT HEMATURIA: ICD-10-CM

## 2023-04-15 LAB
ALBUMIN SERPL BCG-MCNC: 4.5 G/DL (ref 3.5–5.2)
ALBUMIN UR-MCNC: NEGATIVE MG/DL
ALP SERPL-CCNC: 63 U/L (ref 40–129)
ALT SERPL W P-5'-P-CCNC: 15 U/L (ref 10–50)
ANION GAP SERPL CALCULATED.3IONS-SCNC: 13 MMOL/L (ref 7–15)
APPEARANCE UR: CLEAR
AST SERPL W P-5'-P-CCNC: 20 U/L (ref 10–50)
BACTERIA #/AREA URNS HPF: ABNORMAL /HPF
BASOPHILS # BLD AUTO: 0 10E3/UL (ref 0–0.2)
BASOPHILS NFR BLD AUTO: 0 %
BILIRUB SERPL-MCNC: 1 MG/DL
BILIRUB UR QL STRIP: NEGATIVE
BUN SERPL-MCNC: 18 MG/DL (ref 8–23)
CALCIUM SERPL-MCNC: 9.4 MG/DL (ref 8.8–10.2)
CHLORIDE SERPL-SCNC: 103 MMOL/L (ref 98–107)
COLOR UR AUTO: ABNORMAL
CREAT SERPL-MCNC: 1.15 MG/DL (ref 0.67–1.17)
DEPRECATED HCO3 PLAS-SCNC: 22 MMOL/L (ref 22–29)
EOSINOPHIL # BLD AUTO: 0 10E3/UL (ref 0–0.7)
EOSINOPHIL NFR BLD AUTO: 0 %
ERYTHROCYTE [DISTWIDTH] IN BLOOD BY AUTOMATED COUNT: 12.7 % (ref 10–15)
GFR SERPL CREATININE-BSD FRML MDRD: 72 ML/MIN/1.73M2
GLUCOSE SERPL-MCNC: 101 MG/DL (ref 70–99)
GLUCOSE UR STRIP-MCNC: NEGATIVE MG/DL
HCT VFR BLD AUTO: 44.7 % (ref 40–53)
HGB BLD-MCNC: 15.2 G/DL (ref 13.3–17.7)
HGB UR QL STRIP: ABNORMAL
HOLD SPECIMEN: NORMAL
IMM GRANULOCYTES # BLD: 0.1 10E3/UL
IMM GRANULOCYTES NFR BLD: 0 %
KETONES UR STRIP-MCNC: ABNORMAL MG/DL
LEUKOCYTE ESTERASE UR QL STRIP: ABNORMAL
LYMPHOCYTES # BLD AUTO: 1 10E3/UL (ref 0.8–5.3)
LYMPHOCYTES NFR BLD AUTO: 9 %
MCH RBC QN AUTO: 31.4 PG (ref 26.5–33)
MCHC RBC AUTO-ENTMCNC: 34 G/DL (ref 31.5–36.5)
MCV RBC AUTO: 92 FL (ref 78–100)
MONOCYTES # BLD AUTO: 0.6 10E3/UL (ref 0–1.3)
MONOCYTES NFR BLD AUTO: 5 %
MUCOUS THREADS #/AREA URNS LPF: PRESENT /LPF
NEUTROPHILS # BLD AUTO: 9.9 10E3/UL (ref 1.6–8.3)
NEUTROPHILS NFR BLD AUTO: 86 %
NITRATE UR QL: NEGATIVE
NRBC # BLD AUTO: 0 10E3/UL
NRBC BLD AUTO-RTO: 0 /100
PH UR STRIP: 5.5 [PH] (ref 5–7)
PLATELET # BLD AUTO: 191 10E3/UL (ref 150–450)
POTASSIUM SERPL-SCNC: 4.3 MMOL/L (ref 3.4–5.3)
PROT SERPL-MCNC: 7 G/DL (ref 6.4–8.3)
RBC # BLD AUTO: 4.84 10E6/UL (ref 4.4–5.9)
RBC URINE: 5 /HPF
SODIUM SERPL-SCNC: 138 MMOL/L (ref 136–145)
SP GR UR STRIP: 1.01 (ref 1–1.03)
UROBILINOGEN UR STRIP-MCNC: NORMAL MG/DL
WBC # BLD AUTO: 11.6 10E3/UL (ref 4–11)
WBC URINE: 63 /HPF

## 2023-04-15 PROCEDURE — 99284 EMERGENCY DEPT VISIT MOD MDM: CPT | Performed by: EMERGENCY MEDICINE

## 2023-04-15 PROCEDURE — 81001 URINALYSIS AUTO W/SCOPE: CPT | Performed by: EMERGENCY MEDICINE

## 2023-04-15 PROCEDURE — 36415 COLL VENOUS BLD VENIPUNCTURE: CPT | Performed by: EMERGENCY MEDICINE

## 2023-04-15 PROCEDURE — 99283 EMERGENCY DEPT VISIT LOW MDM: CPT | Performed by: EMERGENCY MEDICINE

## 2023-04-15 PROCEDURE — 250N000013 HC RX MED GY IP 250 OP 250 PS 637: Performed by: EMERGENCY MEDICINE

## 2023-04-15 PROCEDURE — 85025 COMPLETE CBC W/AUTO DIFF WBC: CPT | Performed by: EMERGENCY MEDICINE

## 2023-04-15 PROCEDURE — 80053 COMPREHEN METABOLIC PANEL: CPT | Performed by: EMERGENCY MEDICINE

## 2023-04-15 PROCEDURE — 87186 SC STD MICRODIL/AGAR DIL: CPT | Performed by: EMERGENCY MEDICINE

## 2023-04-15 RX ORDER — SULFAMETHOXAZOLE/TRIMETHOPRIM 800-160 MG
1 TABLET ORAL 2 TIMES DAILY
Qty: 14 TABLET | Refills: 0 | Status: SHIPPED | OUTPATIENT
Start: 2023-04-15 | End: 2023-04-22

## 2023-04-15 RX ORDER — SULFAMETHOXAZOLE/TRIMETHOPRIM 800-160 MG
1 TABLET ORAL ONCE
Status: COMPLETED | OUTPATIENT
Start: 2023-04-15 | End: 2023-04-15

## 2023-04-15 RX ADMIN — SULFAMETHOXAZOLE AND TRIMETHOPRIM 1 TABLET: 800; 160 TABLET ORAL at 20:19

## 2023-04-15 ASSESSMENT — ACTIVITIES OF DAILY LIVING (ADL): ADLS_ACUITY_SCORE: 33

## 2023-04-15 NOTE — ED TRIAGE NOTES
C/o possible UTI, fever Temp 99.9, severe pain with urination, c/o increased frequency and little output. Symptoms developed yesterday, increasing in severity since   Urine is clear   Denies any hematuria or flank pain

## 2023-04-15 NOTE — ED PROVIDER NOTES
[place] EMERGENCY DEPARTMENT ([facility])  4/15/23  History   No chief complaint on file.    The history is provided by the patient and medical records.     Tommy Lerner is a 62 year old male who presents to the ED for evaluation of dysuria and fever. Patient states he has been having pain when urinating and severe frequency with little output. Patient states he has also been having fevers and chills. Patient denies any pain by palpation to his flank or lower abdomen. He denies any back pain with this however, is unsure as he has persistent lower back pain. He states he was biking yesterday and it was uncomfortable for him. He reports he has never had this problem before. Patient states he has had mild enlargement of his prostate before. He is not on any medication for this. No other symptoms noted.      Past Medical History  Past Medical History:   Diagnosis Date     Chronic cough     Due to dairy and soy allergies.     Past Surgical History:   Procedure Laterality Date     vasectomy       albuterol (PROAIR HFA/PROVENTIL HFA/VENTOLIN HFA) 108 (90 Base) MCG/ACT inhaler  sildenafil (VIAGRA) 100 MG tablet  Vitamin D, Cholecalciferol, 25 MCG (1000 UT) CAPS      Allergies   Allergen Reactions     Lactase-Lactobacillus Cough     Soy Allergy      Family History  Family History   Problem Relation Age of Onset     Pancreatic Cancer Maternal Grandmother      Hyperlipidemia Father      Other - See Comments Father         idiopathic pulmonary fibrosis     Idiopathic pulmonary fibrosis Father      Other - See Comments Other         Etoh dependence, dad and brother     Diabetes Other         m unc, type I     Other - See Comments Mother 80        memory loss     Sleep Apnea Mother      Social History   Social History     Tobacco Use     Smoking status: Never     Smokeless tobacco: Former     Types: Chew     Quit date: 6/24/1994   Substance Use Topics     Alcohol use: Yes     Comment: 7-14 drinks per week      Drug use: No      Past medical history, past surgical history, medications, allergies, family history, and social history were reviewed with the patient. No additional pertinent items.     Review of Systems  A complete review of systems was performed with pertinent positives and negatives noted in the HPI, and all other systems negative.    Physical Exam          Physical Exam  Vitals and nursing note reviewed.   Constitutional:       General: He is not in acute distress.     Appearance: He is well-developed. He is not diaphoretic.   HENT:      Head: Normocephalic and atraumatic.      Mouth/Throat:      Pharynx: No oropharyngeal exudate.   Eyes:      General: No scleral icterus.        Right eye: No discharge.         Left eye: No discharge.      Pupils: Pupils are equal, round, and reactive to light.   Cardiovascular:      Rate and Rhythm: Normal rate and regular rhythm.      Heart sounds: Normal heart sounds. No murmur heard.     No friction rub. No gallop.   Pulmonary:      Effort: Pulmonary effort is normal. No respiratory distress.      Breath sounds: Normal breath sounds. No wheezing.   Chest:      Chest wall: No tenderness.   Abdominal:      General: Bowel sounds are normal. There is no distension.      Palpations: Abdomen is soft.      Tenderness: There is no abdominal tenderness.   Musculoskeletal:         General: No tenderness or deformity. Normal range of motion.      Cervical back: Normal range of motion and neck supple.   Skin:     General: Skin is warm and dry.      Coloration: Skin is not pale.      Findings: No erythema or rash.   Neurological:      Mental Status: He is alert and oriented to person, place, and time.      Cranial Nerves: No cranial nerve deficit.         ED Course, Procedures, & Data        Procedures               No results found for this or any previous visit (from the past 24 hour(s)).  Medications - No data to display              Assessments & Plan   This 62-year-old male who  presents with dysuria, difficulty voiding and fever/chills.  Patient has not had similar occurrence in the past.  Exam demonstrates no acute abnormalities.  UA shows likely UTI.  Bladder scan showed 100 mL.  I discussed all results with patient.  We will start patient on a course of antibiotics.  We will avoid fluoroquinolones as patient exercises and does long bicycle rides.  We will discharge with return precautions.    I have reviewed the nursing notes.    I have reviewed the findings, diagnosis, plan and need for follow up with the patient.    New Prescriptions    No medications on file       Final diagnoses:   None   I, KALYANI SADLER, am serving as a trained medical scribe to document services personally performed by Tommy Zacarias DO, based on the provider's statements to me.      Tommy KING DO, was physically present and have reviewed and verified the accuracy of this note documented by KALYANI SADLER.    Tommy Zacarias DO  4/15/2023   [facility] EMERGENCY DEPARTMENT     Tommy Zacarias DO  04/16/23 0435

## 2023-04-15 NOTE — TELEPHONE ENCOUNTER
"Telephone Call:     Pt calling to report that he thinks that he has a UTI and he is planning on going to the [facility] [place] Pt is calling to find out the hours and location of the nearby [facility].     Pt's sx:   Mild fever  Body aches  Hurts to pee  Frequently urination    Pt going to [facility] and is not looking for triage recommendation on if he should be seen.     Hannah Szymanski RN  [facility] Nurse Advisor 1:48 PM 4/15/2023    "

## 2023-04-16 LAB — BACTERIA UR CULT: ABNORMAL

## 2023-04-20 ENCOUNTER — TELEPHONE (OUTPATIENT)
Dept: NEUROSURGERY | Facility: CLINIC | Age: 63
End: 2023-04-20

## 2023-04-20 DIAGNOSIS — G93.0 ARACHNOID CYST: Primary | ICD-10-CM

## 2023-05-02 ENCOUNTER — OFFICE VISIT (OUTPATIENT)
Dept: FAMILY MEDICINE | Facility: CLINIC | Age: 63
End: 2023-05-02
Payer: COMMERCIAL

## 2023-05-02 VITALS
HEIGHT: 72 IN | WEIGHT: 163 LBS | SYSTOLIC BLOOD PRESSURE: 118 MMHG | BODY MASS INDEX: 22.08 KG/M2 | OXYGEN SATURATION: 96 % | TEMPERATURE: 98 F | HEART RATE: 70 BPM | RESPIRATION RATE: 15 BRPM | DIASTOLIC BLOOD PRESSURE: 77 MMHG

## 2023-05-02 DIAGNOSIS — N30.00 ACUTE CYSTITIS WITHOUT HEMATURIA: ICD-10-CM

## 2023-05-02 DIAGNOSIS — N41.0 ACUTE PROSTATITIS: Primary | ICD-10-CM

## 2023-05-02 LAB
ALBUMIN UR-MCNC: 10 MG/DL
ANION GAP SERPL CALCULATED.3IONS-SCNC: 10 MMOL/L (ref 7–15)
APPEARANCE UR: CLEAR
BACTERIA #/AREA URNS HPF: ABNORMAL /HPF
BILIRUB UR QL STRIP: NEGATIVE
BUN SERPL-MCNC: 12.6 MG/DL (ref 8–23)
CALCIUM SERPL-MCNC: 9.1 MG/DL (ref 8.8–10.2)
CHLORIDE SERPL-SCNC: 107 MMOL/L (ref 98–107)
COLOR UR AUTO: ABNORMAL
CREAT SERPL-MCNC: 1.15 MG/DL (ref 0.67–1.17)
CRP SERPL-MCNC: 55.2 MG/L
DEPRECATED HCO3 PLAS-SCNC: 25 MMOL/L (ref 22–29)
GFR SERPL CREATININE-BSD FRML MDRD: 72 ML/MIN/1.73M2
GLUCOSE SERPL-MCNC: 102 MG/DL (ref 70–99)
GLUCOSE UR STRIP-MCNC: NEGATIVE MG/DL
HGB UR QL STRIP: NEGATIVE
KETONES UR STRIP-MCNC: NEGATIVE MG/DL
LEUKOCYTE ESTERASE UR QL STRIP: ABNORMAL
MUCOUS THREADS #/AREA URNS LPF: PRESENT /LPF
NITRATE UR QL: POSITIVE
PH UR STRIP: 6 [PH] (ref 5–7)
POTASSIUM SERPL-SCNC: 4.3 MMOL/L (ref 3.4–5.3)
PSA SERPL DL<=0.01 NG/ML-MCNC: 4.55 NG/ML (ref 0–4.5)
RBC URINE: 1 /HPF
SODIUM SERPL-SCNC: 142 MMOL/L (ref 136–145)
SP GR UR STRIP: 1.02 (ref 1–1.03)
UROBILINOGEN UR STRIP-MCNC: NORMAL MG/DL
WBC URINE: 73 /HPF

## 2023-05-02 PROCEDURE — 86140 C-REACTIVE PROTEIN: CPT | Mod: ORL | Performed by: INTERNAL MEDICINE

## 2023-05-02 PROCEDURE — 80048 BASIC METABOLIC PNL TOTAL CA: CPT | Mod: ORL | Performed by: INTERNAL MEDICINE

## 2023-05-02 PROCEDURE — 87186 SC STD MICRODIL/AGAR DIL: CPT | Mod: ORL | Performed by: INTERNAL MEDICINE

## 2023-05-02 PROCEDURE — 87086 URINE CULTURE/COLONY COUNT: CPT | Mod: ORL | Performed by: INTERNAL MEDICINE

## 2023-05-02 PROCEDURE — G0103 PSA SCREENING: HCPCS | Mod: ORL | Performed by: INTERNAL MEDICINE

## 2023-05-02 PROCEDURE — 81001 URINALYSIS AUTO W/SCOPE: CPT | Mod: ORL | Performed by: INTERNAL MEDICINE

## 2023-05-02 RX ORDER — TAMSULOSIN HYDROCHLORIDE 0.4 MG/1
0.4 CAPSULE ORAL DAILY
Qty: 90 CAPSULE | Refills: 1 | Status: SHIPPED | OUTPATIENT
Start: 2023-05-02 | End: 2023-06-27

## 2023-05-02 RX ORDER — SULFAMETHOXAZOLE/TRIMETHOPRIM 800-160 MG
1 TABLET ORAL 2 TIMES DAILY
Qty: 60 TABLET | Refills: 0 | Status: SHIPPED | OUTPATIENT
Start: 2023-05-02 | End: 2023-06-27

## 2023-05-02 ASSESSMENT — ASTHMA QUESTIONNAIRES
ACT_TOTALSCORE: 24
QUESTION_1 LAST FOUR WEEKS HOW MUCH OF THE TIME DID YOUR ASTHMA KEEP YOU FROM GETTING AS MUCH DONE AT WORK, SCHOOL OR AT HOME: NONE OF THE TIME
QUESTION_2 LAST FOUR WEEKS HOW OFTEN HAVE YOU HAD SHORTNESS OF BREATH: NOT AT ALL
QUESTION_4 LAST FOUR WEEKS HOW OFTEN HAVE YOU USED YOUR RESCUE INHALER OR NEBULIZER MEDICATION (SUCH AS ALBUTEROL): NOT AT ALL
QUESTION_3 LAST FOUR WEEKS HOW OFTEN DID YOUR ASTHMA SYMPTOMS (WHEEZING, COUGHING, SHORTNESS OF BREATH, CHEST TIGHTNESS OR PAIN) WAKE YOU UP AT NIGHT OR EARLIER THAN USUAL IN THE MORNING: NOT AT ALL
ACT_TOTALSCORE: 24
QUESTION_5 LAST FOUR WEEKS HOW WOULD YOU RATE YOUR ASTHMA CONTROL: WELL CONTROLLED

## 2023-05-02 NOTE — NURSING NOTE
"62 year old  Chief Complaint   Patient presents with     UTI     Previous prescribed penicillin for UTI; however, it returned while taking the medication. Pain urinating and chills, same sx as two weeks ago.       Blood pressure 118/77, pulse 70, temperature 98  F (36.7  C), temperature source Skin, resp. rate 15, height 1.816 m (5' 11.5\"), weight 73.9 kg (163 lb), SpO2 96 %. Body mass index is 22.42 kg/m .  Patient Active Problem List   Diagnosis     WALDEMAR (obstructive sleep apnea)     Anosmia     Neuropathy       Wt Readings from Last 2 Encounters:   05/02/23 73.9 kg (163 lb)   09/13/22 73.6 kg (162 lb 4.8 oz)     BP Readings from Last 3 Encounters:   05/02/23 118/77   04/15/23 (!) 142/89   09/13/22 124/82         Current Outpatient Medications   Medication     albuterol (PROAIR HFA/PROVENTIL HFA/VENTOLIN HFA) 108 (90 Base) MCG/ACT inhaler     sildenafil (VIAGRA) 100 MG tablet     Vitamin D, Cholecalciferol, 25 MCG (1000 UT) CAPS     No current facility-administered medications for this visit.       Social History     Tobacco Use     Smoking status: Never     Smokeless tobacco: Former     Types: Chew     Quit date: 6/24/1994   Vaping Use     Vaping status: Never Used   Substance Use Topics     Alcohol use: Yes     Comment: 7-14 drinks per week     Drug use: No       Health Maintenance Due   Topic Date Due     ASTHMA ACTION PLAN  Never done     YEARLY PREVENTIVE VISIT  07/02/2022     DTAP/TDAP/TD IMMUNIZATION (2 - Td or Tdap) 03/08/2023       No results found for: PAP      May 2, 2023 3:30 PM    "

## 2023-05-02 NOTE — Clinical Note
TERRY Briceno about your patient Jaime. Treated for prostatitis with Bactrim and tamsulosin. Reported dizziness with medication. Stopped taking tamsulosin after one day. I switched him to Ciprofloxacin. He has follow up with you on 5/19 for repeat labs. I only gave him 4 wks of medication but he may need longer course. I'll leave that up to you to track labs. Appt with urology not until July 2023.   If worsening, would consider CT scan to rule out abscess.  Shae Brumfield MD Internal Medicine/Pediatrics

## 2023-05-02 NOTE — PROGRESS NOTES
"Tommy Lerner is a 62 year old male here for the following issues:    Hospital follow up  Jaime is a 62 year old male with recent ER visit on 4/15/23. He first noted fatigue after a long bike ride. Over the next day, he developed fever to 100F, dysuria ,\"urination was agonizing\", voiding small amounts. No hematuria.  He proceeded to the ER for evaluation. UA showed large LE, Nitrite Neg and 63 WBC. He was diagnosed with acute cystitis and given rx for Bactrim DS tablet for 7 days.  After treatment, Jaime noted he was able to void larger urine volumes, and flow improved, but he still had a bit of burning with voiding.    Yesterday, Jaime reports he felt great in the morning. In the afternoon, he felt tired and was shaking, but no fever. He again had dysuria and aching joints. Drank cranberry juice last night and felt better. He has been drinking fluids all day to stay hydrated.       Patient Active Problem List   Diagnosis     WALDEMAR (obstructive sleep apnea)     Anosmia     Neuropathy       Current Outpatient Medications   Medication Sig Dispense Refill     albuterol (PROAIR HFA/PROVENTIL HFA/VENTOLIN HFA) 108 (90 Base) MCG/ACT inhaler Inhale 2 puffs into the lungs every 4 hours as needed for shortness of breath / dyspnea or wheezing 18 g 3     sildenafil (VIAGRA) 100 MG tablet Take 1 tablet (100 mg) by mouth daily as needed (ED) Take 30min- 4 hrs before intercourse.No use with nitroglycerin, terazosin or doxazosin. 30 tablet 5     Vitamin D, Cholecalciferol, 25 MCG (1000 UT) CAPS          Allergies   Allergen Reactions     Lactase-Lactobacillus Cough     Soy Allergy         EXAM  /77 (BP Location: Right arm, Patient Position: Sitting, Cuff Size: Adult Regular)   Pulse 70   Temp 98  F (36.7  C) (Skin)   Resp 15   Ht 1.816 m (5' 11.5\")   Wt 73.9 kg (163 lb)   SpO2 96%   BMI 22.42 kg/m    Gen: Alert, pleasant, NAD  COR: S1,S2, no murmur  Lungs: CTA bilaterally, no rhonchi, wheezes or rales  Abdomen: Soft, " non tender, normal bowel sounds, no HSM or mass  No CVAT   exam is deferred    Results for orders placed or performed in visit on 05/02/23   Routine UA with micro reflex to culture     Status: Abnormal    Specimen: Urine, Clean Catch   Result Value Ref Range    Color Urine Light Yellow Colorless, Straw, Light Yellow, Yellow    Appearance Urine Clear Clear    Glucose Urine Negative Negative mg/dL    Bilirubin Urine Negative Negative    Ketones Urine Negative Negative mg/dL    Specific Gravity Urine 1.023 1.003 - 1.035    Blood Urine Negative Negative    pH Urine 6.0 5.0 - 7.0    Protein Albumin Urine 10 (A) Negative mg/dL    Urobilinogen Urine Normal Normal, 2.0 mg/dL    Nitrite Urine Positive (A) Negative    Leukocyte Esterase Urine Large (A) Negative    Bacteria Urine Few (A) None Seen /HPF    Mucus Urine Present (A) None Seen /LPF    RBC Urine 1 <=2 /HPF    WBC Urine 73 (H) <=5 /HPF    Narrative    Urine Culture ordered based on laboratory criteria   Prostate spec antigen screen     Status: Abnormal   Result Value Ref Range    Prostate Specific Antigen Screen 4.55 (H) 0.00 - 4.50 ng/mL    Narrative    This result is obtained using the Roche Elecsys total PSA method on the romel [id number] immunoassay analyzer. Results obtained with different assay methods or kits cannot be used interchangeably.   CRP inflammation     Status: Abnormal   Result Value Ref Range    CRP Inflammation 55.20 (H) <5.00 mg/L   Basic metabolic panel     Status: Abnormal   Result Value Ref Range    Sodium 142 136 - 145 mmol/L    Potassium 4.3 3.4 - 5.3 mmol/L    Chloride 107 98 - 107 mmol/L    Carbon Dioxide (CO2) 25 22 - 29 mmol/L    Anion Gap 10 7 - 15 mmol/L    Urea Nitrogen 12.6 8.0 - 23.0 mg/dL    Creatinine 1.15 0.67 - 1.17 mg/dL    Calcium 9.1 8.8 - 10.2 mg/dL    Glucose 102 (H) 70 - 99 mg/dL    GFR Estimate 72 >60 mL/min/1.73m2   Urine Culture Aerobic Bacterial     Status: Abnormal    Specimen: Urine, Clean Catch   Result Value Ref Range     Culture >100,000 CFU/mL Escherichia coli (A)        Susceptibility    Escherichia coli - JV     Ampicillin <=2 Susceptible ug/mL     Ampicillin/ Sulbactam <=2 Susceptible ug/mL     Piperacillin/Tazobactam <=4 Susceptible ug/mL     Cefazolin* <=4 Susceptible ug/mL      * Cefazolin JV breakpoints are for the treatment of uncomplicated urinary tract infections. For the treatment of systemic infections, please contact the laboratory for additional testing.     Cefoxitin <=4 Susceptible ug/mL     Ceftazidime <=1 Susceptible ug/mL     Ceftriaxone <=1 Susceptible ug/mL     Cefepime <=1 Susceptible ug/mL     Gentamicin <=1 Susceptible ug/mL     Tobramycin <=1 Susceptible ug/mL     Ciprofloxacin <=0.25 Susceptible ug/mL     Levofloxacin <=0.12 Susceptible ug/mL     Nitrofurantoin <=16 Susceptible ug/mL     Trimethoprim/Sulfamethoxazole <=1/19 Susceptible ug/mL   Urine Culture     Status: Abnormal    Specimen: Urine, Clean Catch   Result Value Ref Range    Culture >100,000 CFU/mL Escherichia coli (A)        Susceptibility    Escherichia coli - JV     Ampicillin <=2 Susceptible ug/mL     Ampicillin/ Sulbactam <=2 Susceptible ug/mL     Piperacillin/Tazobactam <=4 Susceptible ug/mL     Cefazolin* <=4 Susceptible ug/mL      * Cefazolin JV breakpoints are for the treatment of uncomplicated urinary tract infections. For the treatment of systemic infections, please contact the laboratory for additional testing.     Cefoxitin <=4 Susceptible ug/mL     Ceftazidime <=1 Susceptible ug/mL     Ceftriaxone <=1 Susceptible ug/mL     Cefepime <=1 Susceptible ug/mL     Gentamicin <=1 Susceptible ug/mL     Tobramycin <=1 Susceptible ug/mL     Ciprofloxacin <=0.25 Susceptible ug/mL     Levofloxacin <=0.12 Susceptible ug/mL     Nitrofurantoin <=16 Susceptible ug/mL     Trimethoprim/Sulfamethoxazole <=1/19 Susceptible ug/mL       Assessment:  (N41.0) Acute prostatitis  (primary encounter diagnosis)  (N30.00) Acute cystitis without  hematuria  Comment: clinical picture of acute prostatis  Plan: Routine UA with micro reflex to culture, Urine         Culture Aerobic Bacterial, Prostate spec         antigen screen, CRP inflammation, Basic         metabolic panel, tamsulosin (FLOMAX) 0.4 MG         capsule, sulfamethoxazole-trimethoprim (BACTRIM        DS) 800-160 MG tablet, Adult Urology          Referral, Urine Culture        Discussed TMP-Sulfa twice daily for the next 4 wks. Recommend he start Tamsulosin, rx sent to pharmacy.  Referral to urology sent.   He is to follow up in 2-3 wks for repeat labs and med check.  Call sooner for any concerns.    ADDENDUM May 10, 2023  Jaime called the clinic to discuss dizziness with taking Bactrim. He is on day #8.   Also reports some numbness on tip of tongue, no other neuro changes.    He also reports stopping tamsulosin after one day of use, due to dizziness.     Plan:  Rx switched to Ciprofloxacin 500mg BID x 4 wks  Maintain good hydration.    Follow up with Dr. Ash on 5/19 for recheck, labs.  Contact us sooner if having persistent dizziness or inability to take medication.    Shae Brumfield MD  Internal Medicine/Pediatrics

## 2023-05-03 LAB
BACTERIA UR CULT: ABNORMAL
BACTERIA UR CULT: ABNORMAL

## 2023-05-10 ENCOUNTER — TELEPHONE (OUTPATIENT)
Dept: FAMILY MEDICINE | Facility: CLINIC | Age: 63
End: 2023-05-10

## 2023-05-10 ENCOUNTER — MYC MEDICAL ADVICE (OUTPATIENT)
Dept: FAMILY MEDICINE | Facility: CLINIC | Age: 63
End: 2023-05-10

## 2023-05-10 DIAGNOSIS — N41.0 ACUTE PROSTATITIS: Primary | ICD-10-CM

## 2023-05-10 RX ORDER — CIPROFLOXACIN 500 MG/1
500 TABLET, FILM COATED ORAL 2 TIMES DAILY
Qty: 60 TABLET | Refills: 0 | Status: SHIPPED | OUTPATIENT
Start: 2023-05-10 | End: 2023-06-27

## 2023-05-10 NOTE — TELEPHONE ENCOUNTER
Spoke with pt and informed him Dr. Brumfield will call in Ciprofloxacin BID to the [facility] on [facility].   Advised him to stay hydrated and follow-up with Dr. Ash on 5/19/2023 as planned.     SERGEI Wright, RN  05/10/23, 4:09 PM

## 2023-05-10 NOTE — TELEPHONE ENCOUNTER
"Patient is on day 8 of Bactrim DS.  Reports waking up \"unbelievably dizzy 3 days ago and still feeling mildy dizzy today.\"   He notes that he was not dizzy for one of the days in between.  On the first day it was hard for him to ambulate to the bathroom.   Pt states \"I looked it up on [employer] and it said to call my doctor so I'm calling.\"  Today patient is noticing that only the tip of his tongue is numb. No numbness in the rest of his tongue, lips or face.   Pt states he is speaking normally.  Enunciating well on phone.  Reviewed Med list with pt and he reports taking Tamsulosin for one day, one week ago, which made him dizzy so he stopped taking it until he is finished with the antibiotic treatment.  Notifying Dr. Brumfield.for advice.    SERGEI Wright, RN  05/10/23, 3:28 PM      "

## 2023-05-18 NOTE — PROGRESS NOTES
"  Assessment & Plan   Problem List Items Addressed This Visit        Urinary    Acute prostatitis - Primary    Relevant Orders    Urinalysis Macroscopic (Completed)    Urine Culture Aerobic Bacterial    CRP inflammation    PSA tumor marker     Recheck labs today. Will notify Jaime if concerning results.      22 minutes spent on the date of the encounter doing chart review, history and exam, documentation and further activities as noted.    MD MAHIN Graves [facility]    Valeri Sandoval is a 62 year old, presenting for the following health issues:  RECHECK (Follow up on UTI -- seems to be clearing up. )    HPI   Acute prostatitis  - seen by Dr. Brumfield on 5/2, started on Bactrim, later switched to ciprofloxacin BID for 4 weeks  - started on tamsulosin which was later stopped due to lightheadedness  - referred to urology, visit scheduled for 7/25  Today  - feeling much better although does continue to report some mild fatigue  - no dysuria, hematuria  - continues to bike regularly      Review of Systems   Constitutional, HEENT, cardiovascular, pulmonary, gi and gu systems are negative, except as otherwise noted.      Objective    /82 (BP Location: Right arm, Patient Position: Sitting, Cuff Size: Adult Regular)   Pulse 62   Temp 97.2  F (36.2  C) (Skin)   Resp 15   Ht 1.803 m (5' 11\")   Wt 74.4 kg (164 lb)   SpO2 97%   BMI 22.87 kg/m    Body mass index is 22.87 kg/m .  Physical Exam   GENERAL: healthy, alert and no distress  NECK: no adenopathy, no asymmetry, masses, or scars and thyroid normal to palpation  RESP: lungs clear to auscultation - no rales, rhonchi or wheezes  CV: regular rate and rhythm, normal S1 S2, no S3 or S4, no murmur, click or rub, no peripheral edema and peripheral pulses strong  ABDOMEN: soft, nontender, no hepatosplenomegaly, no masses and bowel sounds normal  MS: no gross musculoskeletal defects noted, no edema              "
17:20

## 2023-05-19 ENCOUNTER — OFFICE VISIT (OUTPATIENT)
Dept: FAMILY MEDICINE | Facility: CLINIC | Age: 63
End: 2023-05-19
Payer: COMMERCIAL

## 2023-05-19 VITALS
TEMPERATURE: 97.2 F | HEART RATE: 62 BPM | HEIGHT: 71 IN | BODY MASS INDEX: 22.96 KG/M2 | OXYGEN SATURATION: 97 % | SYSTOLIC BLOOD PRESSURE: 125 MMHG | RESPIRATION RATE: 15 BRPM | DIASTOLIC BLOOD PRESSURE: 82 MMHG | WEIGHT: 164 LBS

## 2023-05-19 DIAGNOSIS — N41.0 ACUTE PROSTATITIS: Primary | ICD-10-CM

## 2023-05-19 LAB
ALBUMIN UR-MCNC: NEGATIVE MG/DL
APPEARANCE UR: CLEAR
BILIRUB UR QL STRIP: NEGATIVE
COLOR UR AUTO: YELLOW
CRP SERPL-MCNC: <3 MG/L
GLUCOSE UR STRIP-MCNC: NEGATIVE MG/DL
HGB UR QL STRIP: NEGATIVE
KETONES UR STRIP-MCNC: NEGATIVE MG/DL
LEUKOCYTE ESTERASE UR QL STRIP: NEGATIVE
NITRATE UR QL: NEGATIVE
PH UR STRIP: 6.5 [PH] (ref 5–7)
PSA SERPL DL<=0.01 NG/ML-MCNC: 3.75 NG/ML (ref 0–4.5)
SP GR UR STRIP: 1.02 (ref 1–1.03)
UROBILINOGEN UR STRIP-ACNC: 0.2 E.U./DL

## 2023-05-19 PROCEDURE — 86140 C-REACTIVE PROTEIN: CPT | Mod: ORL | Performed by: FAMILY MEDICINE

## 2023-05-19 PROCEDURE — 87086 URINE CULTURE/COLONY COUNT: CPT | Mod: ORL | Performed by: FAMILY MEDICINE

## 2023-05-19 PROCEDURE — 84153 ASSAY OF PSA TOTAL: CPT | Mod: ORL | Performed by: FAMILY MEDICINE

## 2023-05-19 NOTE — NURSING NOTE
"62 year old  Chief Complaint   Patient presents with     RECHECK     Follow up on UTI -- seems to be clearing up.        Blood pressure 125/82, pulse 62, temperature 97.2  F (36.2  C), temperature source Skin, resp. rate 15, height 1.803 m (5' 11\"), weight 74.4 kg (164 lb), SpO2 97 %. Body mass index is 22.87 kg/m .  Patient Active Problem List   Diagnosis     WALDEMAR (obstructive sleep apnea)     Anosmia     Neuropathy     Acute prostatitis       Wt Readings from Last 2 Encounters:   05/19/23 74.4 kg (164 lb)   05/02/23 73.9 kg (163 lb)     BP Readings from Last 3 Encounters:   05/19/23 125/82   05/02/23 118/77   04/15/23 (!) 142/89         Current Outpatient Medications   Medication     albuterol (PROAIR HFA/PROVENTIL HFA/VENTOLIN HFA) 108 (90 Base) MCG/ACT inhaler     ciprofloxacin (CIPRO) 500 MG tablet     sildenafil (VIAGRA) 100 MG tablet     Vitamin D, Cholecalciferol, 25 MCG (1000 UT) CAPS     sulfamethoxazole-trimethoprim (BACTRIM DS) 800-160 MG tablet     tamsulosin (FLOMAX) 0.4 MG capsule     No current facility-administered medications for this visit.       Social History     Tobacco Use     Smoking status: Never     Smokeless tobacco: Former     Types: Chew     Quit date: 6/24/1994   Vaping Use     Vaping status: Never Used   Substance Use Topics     Alcohol use: Yes     Comment: 7-14 drinks per week     Drug use: No       Health Maintenance Due   Topic Date Due     ASTHMA ACTION PLAN  Never done     YEARLY PREVENTIVE VISIT  07/02/2022     DTAP/TDAP/TD IMMUNIZATION (2 - Td or Tdap) 03/08/2023       No results found for: PAP      May 19, 2023 12:55 PM    "

## 2023-05-21 LAB — BACTERIA UR CULT: NO GROWTH

## 2023-06-23 ENCOUNTER — ANCILLARY PROCEDURE (OUTPATIENT)
Dept: MRI IMAGING | Facility: CLINIC | Age: 63
End: 2023-06-23
Attending: PHYSICIAN ASSISTANT
Payer: COMMERCIAL

## 2023-06-23 DIAGNOSIS — G93.0 ARACHNOID CYST: ICD-10-CM

## 2023-06-23 PROCEDURE — 70551 MRI BRAIN STEM W/O DYE: CPT | Mod: GC | Performed by: RADIOLOGY

## 2023-06-27 ENCOUNTER — VIRTUAL VISIT (OUTPATIENT)
Dept: NEUROSURGERY | Facility: CLINIC | Age: 63
End: 2023-06-27
Payer: COMMERCIAL

## 2023-06-27 DIAGNOSIS — G93.0 ARACHNOID CYST: Primary | ICD-10-CM

## 2023-06-27 PROCEDURE — 99442 PR PHYSICIAN TELEPHONE EVALUATION 11-20 MIN: CPT | Performed by: PHYSICIAN ASSISTANT

## 2023-06-27 NOTE — PROGRESS NOTES
Virtual Visit Details    Type of service:  Video Visit     Originating Location (pt. Location): Home    Distant Location (provider location):  Off-site  Platform used for Video Visit: [employer]     Please note, patient had technical difficulty today with Am[employer]-1-1 and was kicked out of the video multiple times so we transitioned to a telephone call.   Total telephone time: 18 minutes      [facility]  Department of Neurosurgery  [facility]    Name: Tommy Lerner  MRN: [id number]  Age: 62 year old  : 2023      Chief Complaint:   Prepontine/posterior fossa arachnoid cyst, follow up visit    History of Present Illness:   Tommy Lerner is a 62 year old right handed male with a history of WALDEMAR who is seen today for follow up regarding a posterior fossa arachnoid cyst. He last met with Dr. Ahn 2021 at which time they decided to follow every 1-2 years with imaging. He presented with imbalance, progressive lower extremity weakness, numbness in his hands and feet, and tongue numbness in 2019. He was evaluated by Neurology, Dr. Ramirez around this time and had electrophysiological studies, lab workup and found to have a mild polyneuropathy on his LE studies but otherwise negative and reassuring. Today he reports that his symptoms have been quite stable in the past couple of years. It has been 2 years since he met with Dr. Ahn and had imaging. He has continued mild pain in his lower extremities that seems to improve with activity. The leg pain is predominantly in his left quadricep. He has stable numbness in both feet. He denies new headaches, vision changes, facial palsy, hearing loss, weakness, or other complaints today.     Review of Systems:   Pertinent items are noted in HPI or as in patient entered ROS below, remainder of complete ROS is negative.      Physical Exam:   Exam today is limited by video visit. Face appears symmetric. Speech is  normal.     Imaging:  We reviewed the MRI done recently which shows stable sized arachnoid cyst without new abnormality:    FINDINGS:  Unchanged arachnoid cyst in the right prepontine/medullary cistern  measuring 2.5 x 2.5 x 4.3 cm. Unchanged mass effect on the brainstem  and the right 7th and 8th cranial nerves.     No abnormal restricted diffusion. No hydrocephalus. Patent major  intracranial vascular flow voids. Unchanged few punctate foci of T2  hyperintensity in the cerebral hemispheric white matter, not  disproportionate to age.     Orbits are unremarkable. Paranasal sinuses and mastoid air cells are  clear.                                                                      IMPRESSION:  Stable arachnoid cyst in the right prepontine and premedullary cistern  since 2019.     I have personally reviewed the examination and initial interpretation  and I agree with the findings.     MARIAN LEWIS MD      Assessment:  Prepontine/posterior fossa arachnoid cyst, follow up visit    Plan:  We reviewed the MRI findings which are stable and reassuring. He will monitor symptoms and reach out with new concerns, and we'll otherwise plan for follow up in 1-2 years with repeat MRI prior.      Sonia Love PA-C  Department of Neurosurgery  [facility]  [facility]

## 2023-06-27 NOTE — NURSING NOTE
Is the patient currently in the state of [place]? YES    Visit mode:VIDEO    If the visit is dropped, the patient can be reconnected by: TELEPHONE VISIT: Phone number: [phone]    Will anyone else be joining the visit? NO      How would you like to obtain your AVS? MyChart    Are changes needed to the allergy or medication list? NO    Reason for visit: Video Visit (Follow-up MRI)

## 2023-06-27 NOTE — PATIENT INSTRUCTIONS
Jaime, nice to meet with you today. Here is the plan we discussed:    Return in 1-2 years with noncontrasted MRI prior to appointment.  Please reach out sooner with new questions or concerns in the meantime.

## 2023-07-05 ENCOUNTER — PRE VISIT (OUTPATIENT)
Dept: UROLOGY | Facility: CLINIC | Age: 63
End: 2023-07-05
Payer: COMMERCIAL

## 2023-07-20 NOTE — PROGRESS NOTES
Urology Office Visit - Follow Up    Reason for Visit: follow up after prostatitis    HPI: Tommy Lerner is a 62 year old male with a history neuropathy of unknown etiology and mixed LUTS characterized by urinary hesitancy, slow stream, frequency, and urgency. Urodynamics on 5/18/22 demonstrated the following:     -Maximum cystometric capacity 360 mL with normal filling sensations.  -Good bladder compliance without detrusor overactivity or incontinence.  -Maximum detrusor contraction during voiding reaches 80 cm H2O, which he supplements with intermittent abdominal straining. He voids 345 mL with reduced flow (Qmax 7.4 ml/s), prolonged flow curve, quiet EMG activity, and good bladder emptying (PVR ~10-15 ml).  -High pressure/low flow voiding pattern suggestive for bladder outlet obstruction (BOOI of 52.1 also supports this).  -Fluoroscopy reveals a mildly trabeculated bladder wall without diverticuli or VUR. The bladder neck is closed during filling and open during voiding with possible narrowing within the prostatic urethra.    Cystoscopy on 6/28/22 demonstrated mild bilobar prostatic hypertrophy but was otherwise normal. Treatment options including pelvic floor PT vs MIST or TUIP were discussed and he was ultimately referred to PFPT.    Last office visit with me 9/13/22:  Attended one session of PT. Told he was able to relax his pelvic floor relatively well. Recommended to follow up for additional treatments but he has decided not to return as he does not believe it will be of further benefit.  Continues to have urinary hesitancy, slow stream. Now noticing more urgency as he is thinking more about it. No urge incontinence. Symptoms not overly bothersome and are manageable at this time.    Interim events since last visit:  4/15/23 - ER visit for UTI (>100K E coli). Rx Bactrim DS x 7 days.  5/2/23 - primary care visit for ongoing urinary symptoms (>100K E coli). PSA slightly elevated at 4.55. Dx with acute  "prostatitis. Rx Bactrim x 4 weeks and tamsulosin. Switched to Cipro x 4 weeks due to dizziness with Bactrim. Also stopped tamsulosin due to lightheadedness.   5/19/23 - Repeat UCx no growth. Repeat PSA 3.75.    TODAY   7/25/23:  Feels well today. No ongoing prostatitis symptoms. He denies gross hematuria.   Does have his baseline mild obstructive LUTS, but not bothered enough to consider additional treatment at this time.     PEx  /76   Pulse 61   Ht 1.803 m (5' 11\")   Wt 74.4 kg (164 lb)   BMI 22.87 kg/m    GENERAL: Healthy, alert and no distress  EYES: Eyes grossly normal to inspection.  No discharge or erythema, or obvious scleral/conjunctival abnormalities.  RESP: No audible wheeze, cough, or visible cyanosis.  No visible retractions or increased work of breathing.    SKIN: Visible skin clear. No significant rash, abnormal pigmentation or lesions.  NEURO: Cranial nerves grossly intact.  Mentation and speech appropriate for age.  PSYCH: Mentation appears normal, affect normal/bright, judgement and insight intact, normal speech and appearance well-groomed.      LABS:  PSA   Date Value Ref Range Status   07/02/2021 1.49 0 - 4 ug/L Final     Comment:     Assay Method:  Chemiluminescence using Siemens Vista analyzer     PSA Tumor Marker   Date Value Ref Range Status   05/19/2023 3.75 0.00 - 4.50 ng/mL Final   04/28/2022 1.26 0.00 - 4.00 ug/L Final       Creatinine   Date Value Ref Range Status   05/02/2023 1.15 0.67 - 1.17 mg/dL Final   07/02/2021 1.0 0.8 - 1.5 mg/dL Final       Color Urine (no units)   Date Value   05/19/2023 Yellow   12/26/2019 Yellow     Appearance Urine (no units)   Date Value   05/19/2023 Clear   12/26/2019 Clear     Glucose Urine (mg/dL)   Date Value   05/19/2023 Negative   12/26/2019 Negative     Bilirubin Urine (no units)   Date Value   05/19/2023 Negative   12/26/2019 Negative     Ketones Urine (mg/dL)   Date Value   05/19/2023 Negative   12/26/2019 Negative     Specific Gravity " Urine (no units)   Date Value   05/19/2023 1.025   12/26/2019 1.023     pH Urine   Date Value   05/19/2023 6.5   12/26/2019 6.0 pH     Protein Albumin Urine (mg/dL)   Date Value   05/19/2023 Negative   12/26/2019 Negative     Urobilinogen Urine (E.U./dL)   Date Value   05/19/2023 0.2     Nitrite Urine (no units)   Date Value   05/19/2023 Negative   12/26/2019 Negative     Leukocyte Esterase Urine (no units)   Date Value   05/19/2023 Negative   12/26/2019 Negative       Lab Results   Component Value Date    CULTURE No Growth 05/19/2023    CULTURE >100,000 CFU/mL Escherichia coli 05/02/2023    CULTURE >100,000 CFU/mL Escherichia coli 05/02/2023    CULTURE >100,000 CFU/mL Escherichia coli 04/15/2023       ASSESSMENT/PLAN:  62 year old male with mixed LUTS with urodynamic findings of possible bladder outlet obstruction. Cystoscopy with mild BPH, otherwise unremarkable. Given avid cycling and active lifestyle, pelvic floor tension/dysfunction was suspected and he was referred to PFPT. Attended one session and does not plan to follow up as he does not feel it will be of further benefit. Unable to tolerate tamsulosin secondary to side effects of lightheadedness/dizziness. Had acute prostatitis in April/May 2023 s/p treatment with extended course of antibiotics. Symptoms now resolved. PSA was up to 4.55 while having acute symptoms, improved to 3.75 on recheck. Baseline PSA low to mid 1's, so will recheck today to ensure further decrease now that prostatitis has resolved. He has ongoing mild obstructive LUTS, stable with baseline symptoms, but he is not bothered enough to consider additional treatment at this time.    -Recheck PSA today. If back to baseline (mid 1's), resume annual checks with PCP.   -Follow up with urology as needed: if LUTS become bothersome enough to consider treatment, recurrence of UTIs/prostatitis, or other new urologic concerns.     Caroline Lindquist PA-C  Department of Urology    20 minutes spent on  the date of the encounter doing chart review, review of test results, interpretation of tests, patient visit, and documentation

## 2023-07-25 ENCOUNTER — OFFICE VISIT (OUTPATIENT)
Dept: UROLOGY | Facility: CLINIC | Age: 63
End: 2023-07-25
Payer: COMMERCIAL

## 2023-07-25 ENCOUNTER — LAB (OUTPATIENT)
Dept: LAB | Facility: CLINIC | Age: 63
End: 2023-07-25
Payer: COMMERCIAL

## 2023-07-25 ENCOUNTER — MYC MEDICAL ADVICE (OUTPATIENT)
Dept: UROLOGY | Facility: CLINIC | Age: 63
End: 2023-07-25

## 2023-07-25 VITALS
WEIGHT: 164 LBS | BODY MASS INDEX: 22.96 KG/M2 | SYSTOLIC BLOOD PRESSURE: 113 MMHG | DIASTOLIC BLOOD PRESSURE: 76 MMHG | HEIGHT: 71 IN | HEART RATE: 61 BPM

## 2023-07-25 DIAGNOSIS — R97.20 ELEVATED PROSTATE SPECIFIC ANTIGEN (PSA): ICD-10-CM

## 2023-07-25 DIAGNOSIS — R97.20 ELEVATED PROSTATE SPECIFIC ANTIGEN (PSA): Primary | ICD-10-CM

## 2023-07-25 DIAGNOSIS — Z87.438 HISTORY OF PROSTATITIS: Primary | ICD-10-CM

## 2023-07-25 DIAGNOSIS — R39.198 SLOWING OF URINARY STREAM: ICD-10-CM

## 2023-07-25 LAB — PSA SERPL DL<=0.01 NG/ML-MCNC: 2.24 NG/ML (ref 0–4.5)

## 2023-07-25 PROCEDURE — 84153 ASSAY OF PSA TOTAL: CPT | Performed by: PATHOLOGY

## 2023-07-25 PROCEDURE — 36415 COLL VENOUS BLD VENIPUNCTURE: CPT | Performed by: PATHOLOGY

## 2023-07-25 PROCEDURE — 99213 OFFICE O/P EST LOW 20 MIN: CPT | Performed by: PHYSICIAN ASSISTANT

## 2023-07-25 ASSESSMENT — PAIN SCALES - GENERAL: PAINLEVEL: NO PAIN (0)

## 2023-07-25 NOTE — LETTER
7/25/2023       RE: Tommy Lerner  [address]  [facility] [address]     Dear Colleague,    Thank you for referring your patient, Tommy Lerner, to the [facility] [place] [facility]. Please see a copy of my visit note below.    Urology Office Visit - Follow Up    Reason for Visit: follow up after prostatitis    HPI: Tommy Lerner is a 62 year old male with a history neuropathy of unknown etiology and mixed LUTS characterized by urinary hesitancy, slow stream, frequency, and urgency. Urodynamics on 5/18/22 demonstrated the following:     -Maximum cystometric capacity 360 mL with normal filling sensations.  -Good bladder compliance without detrusor overactivity or incontinence.  -Maximum detrusor contraction during voiding reaches 80 cm H2O, which he supplements with intermittent abdominal straining. He voids 345 mL with reduced flow (Qmax 7.4 ml/s), prolonged flow curve, quiet EMG activity, and good bladder emptying (PVR ~10-15 ml).  -High pressure/low flow voiding pattern suggestive for bladder outlet obstruction (BOOI of 52.1 also supports this).  -Fluoroscopy reveals a mildly trabeculated bladder wall without diverticuli or VUR. The bladder neck is closed during filling and open during voiding with possible narrowing within the prostatic urethra.    Cystoscopy on 6/28/22 demonstrated mild bilobar prostatic hypertrophy but was otherwise normal. Treatment options including pelvic floor PT vs MIST or TUIP were discussed and he was ultimately referred to PFPT.    Last office visit with me 9/13/22:  Attended one session of PT. Told he was able to relax his pelvic floor relatively well. Recommended to follow up for additional treatments but he has decided not to return as he does not believe it will be of further benefit.  Continues to have urinary hesitancy, slow stream. Now noticing more urgency as he is thinking more about  "it. No urge incontinence. Symptoms not overly bothersome and are manageable at this time.    Interim events since last visit:  4/15/23 - ER visit for UTI (>100K E coli). Rx Bactrim DS x 7 days.  5/2/23 - primary care visit for ongoing urinary symptoms (>100K E coli). PSA slightly elevated at 4.55. Dx with acute prostatitis. Rx Bactrim x 4 weeks and tamsulosin. Switched to Cipro x 4 weeks due to dizziness with Bactrim. Also stopped tamsulosin due to lightheadedness.   5/19/23 - Repeat UCx no growth. Repeat PSA 3.75.    TODAY   7/25/23:  Feels well today. No ongoing prostatitis symptoms. He denies gross hematuria.   Does have his baseline mild obstructive LUTS, but not bothered enough to consider additional treatment at this time.     PEx  /76   Pulse 61   Ht 1.803 m (5' 11\")   Wt 74.4 kg (164 lb)   BMI 22.87 kg/m    GENERAL: Healthy, alert and no distress  EYES: Eyes grossly normal to inspection.  No discharge or erythema, or obvious scleral/conjunctival abnormalities.  RESP: No audible wheeze, cough, or visible cyanosis.  No visible retractions or increased work of breathing.    SKIN: Visible skin clear. No significant rash, abnormal pigmentation or lesions.  NEURO: Cranial nerves grossly intact.  Mentation and speech appropriate for age.  PSYCH: Mentation appears normal, affect normal/bright, judgement and insight intact, normal speech and appearance well-groomed.      LABS:  PSA   Date Value Ref Range Status   07/02/2021 1.49 0 - 4 ug/L Final     Comment:     Assay Method:  Chemiluminescence using Siemens Vista analyzer     PSA Tumor Marker   Date Value Ref Range Status   05/19/2023 3.75 0.00 - 4.50 ng/mL Final   04/28/2022 1.26 0.00 - 4.00 ug/L Final       Creatinine   Date Value Ref Range Status   05/02/2023 1.15 0.67 - 1.17 mg/dL Final   07/02/2021 1.0 0.8 - 1.5 mg/dL Final       Color Urine (no units)   Date Value   05/19/2023 Yellow   12/26/2019 Yellow     Appearance Urine (no units)   Date Value "   05/19/2023 Clear   12/26/2019 Clear     Glucose Urine (mg/dL)   Date Value   05/19/2023 Negative   12/26/2019 Negative     Bilirubin Urine (no units)   Date Value   05/19/2023 Negative   12/26/2019 Negative     Ketones Urine (mg/dL)   Date Value   05/19/2023 Negative   12/26/2019 Negative     Specific Gravity Urine (no units)   Date Value   05/19/2023 1.025   12/26/2019 1.023     pH Urine   Date Value   05/19/2023 6.5   12/26/2019 6.0 pH     Protein Albumin Urine (mg/dL)   Date Value   05/19/2023 Negative   12/26/2019 Negative     Urobilinogen Urine (E.U./dL)   Date Value   05/19/2023 0.2     Nitrite Urine (no units)   Date Value   05/19/2023 Negative   12/26/2019 Negative     Leukocyte Esterase Urine (no units)   Date Value   05/19/2023 Negative   12/26/2019 Negative       Lab Results   Component Value Date    CULTURE No Growth 05/19/2023    CULTURE >100,000 CFU/mL Escherichia coli 05/02/2023    CULTURE >100,000 CFU/mL Escherichia coli 05/02/2023    CULTURE >100,000 CFU/mL Escherichia coli 04/15/2023       ASSESSMENT/PLAN:  62 year old male with mixed LUTS with urodynamic findings of possible bladder outlet obstruction. Cystoscopy with mild BPH, otherwise unremarkable. Given avid cycling and active lifestyle, pelvic floor tension/dysfunction was suspected and he was referred to PFPT. Attended one session and does not plan to follow up as he does not feel it will be of further benefit. Unable to tolerate tamsulosin secondary to side effects of lightheadedness/dizziness. Had acute prostatitis in April/May 2023 s/p treatment with extended course of antibiotics. Symptoms now resolved. PSA was up to 4.55 while having acute symptoms, improved to 3.75 on recheck. Baseline PSA low to mid 1's, so will recheck today to ensure further decrease now that prostatitis has resolved. He has ongoing mild obstructive LUTS, stable with baseline symptoms, but he is not bothered enough to consider additional treatment at this  time.    -Recheck PSA today. If back to baseline (mid 1's), resume annual checks with PCP.   -Follow up with urology as needed: if LUTS become bothersome enough to consider treatment, recurrence of UTIs/prostatitis, or other new urologic concerns.     Caroline Lindquist PA-C  Department of Urology    20 minutes spent on the date of the encounter doing chart review, review of test results, interpretation of tests, patient visit, and documentation

## 2023-07-25 NOTE — NURSING NOTE
Chief Complaint   Patient presents with     RECHECK     Prostatitis/slow urinary stream     Betzy Butcher, [facility]

## 2023-07-25 NOTE — PATIENT INSTRUCTIONS
UROLOGY CLINIC VISIT PATIENT INSTRUCTIONS    Recheck PSA today. Results will come through on [facility].    Follow up with urology if your urinary symptoms ever get bad enough to consider treatment, or if you develop another episode of prostatitis or a urinary tract infection (UTI).    If you have any issues, questions or concerns in the meantime, do not hesitate to contact us at [phone] or via [facility].     It was a pleasure meeting with you today.  Thank you for allowing me and my team the privilege of caring for you today.  YOU are the reason we are here, and I truly hope we provided you with the excellent service you deserve.  Please let us know if there is anything else we can do for you so that we can be sure you are leaving completely satisfied with your care experience.

## 2023-09-17 ENCOUNTER — HEALTH MAINTENANCE LETTER (OUTPATIENT)
Age: 63
End: 2023-09-17

## 2023-09-28 ENCOUNTER — OFFICE VISIT (OUTPATIENT)
Dept: FAMILY MEDICINE | Facility: CLINIC | Age: 63
End: 2023-09-28
Payer: COMMERCIAL

## 2023-09-28 ENCOUNTER — ANCILLARY PROCEDURE (OUTPATIENT)
Dept: GENERAL RADIOLOGY | Facility: CLINIC | Age: 63
End: 2023-09-28
Attending: FAMILY MEDICINE
Payer: COMMERCIAL

## 2023-09-28 VITALS
SYSTOLIC BLOOD PRESSURE: 125 MMHG | TEMPERATURE: 97.2 F | BODY MASS INDEX: 22.73 KG/M2 | HEART RATE: 56 BPM | WEIGHT: 163 LBS | DIASTOLIC BLOOD PRESSURE: 84 MMHG | OXYGEN SATURATION: 98 %
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
## 2023-09-28 PROBLEM — N41.0 ACUTE PROSTATITIS: Status: RESOLVED | Noted: 2023-05-10 | Resolved: 2023-09-28

## 2023-09-28 PROCEDURE — 72220 X-RAY EXAM SACRUM TAILBONE: CPT | Mod: GC | Performed by: RADIOLOGY

## 2023-09-28 PROCEDURE — 72100 X-RAY EXAM L-S SPINE 2/3 VWS: CPT | Performed by: STUDENT IN AN ORGANIZED HEALTH CARE EDUCATION/TRAINING PROGRAM

## 2023-09-28 PROCEDURE — 73502 X-RAY EXAM HIP UNI 2-3 VIEWS: CPT | Mod: GC | Performed by: RADIOLOGY

## 2023-09-28 NOTE — NURSING NOTE
63 year old  Chief Complaint   Patient presents with    Injury     Hard to breath, right glute numb.       Blood pressure 125/84, pulse 56, temperature 97.2  F (36.2  C), temperature source Skin, weight 73.9 kg (163 lb), SpO2 98 %. Body mass index is 22.73 kg/m .  Patient Active Problem List   Diagnosis    WALDEMAR (obstructive sleep apnea)    Anosmia    Neuropathy    Acute prostatitis       Wt Readings from Last 2 Encounters:   09/28/23 73.9 kg (163 lb)   07/25/23 74.4 kg (164 lb)     BP Readings from Last 3 Encounters:   09/28/23 125/84   07/25/23 113/76   05/19/23 125/82         Current Outpatient Medications   Medication    albuterol (PROAIR HFA/PROVENTIL HFA/VENTOLIN HFA) 108 (90 Base) MCG/ACT inhaler    sildenafil (VIAGRA) 100 MG tablet    Vitamin D, Cholecalciferol, 25 MCG (1000 UT) CAPS     No current facility-administered medications for this visit.       Social History     Tobacco Use    Smoking status: Never    Smokeless tobacco: Former     Types: Chew     Quit date: 6/24/1994   Vaping Use    Vaping Use: Never used   Substance Use Topics    Alcohol use: Yes     Comment: 7-14 drinks per week    Drug use: No       Health Maintenance Due   Topic Date Due    ASTHMA ACTION PLAN  Never done    YEARLY PREVENTIVE VISIT  07/02/2022    DTAP/TDAP/TD IMMUNIZATION (2 - Td or Tdap) 03/08/2023    INFLUENZA VACCINE (1) 09/01/2023       No results found for: PAP      September 28, 2023 9:52 AM
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
## 2023-10-01 NOTE — PROGRESS NOTES
AUDIOLOGY REPORT    SUBJECTIVE:  Tommy Lerner is a 63 year old male who was seen on 10/5/2023 in Audiology at the [facility] for audiologic evaluation, referred by Sonia Love PA-C from Neurosurgery.  The patient has been diagnosed with a prepontine arachnoid cyst.  Dr. Ahn requested the previous evaluation in January 2020 due to potential cranial nerve compression from the cyst.  The previous testing in this clinic on 1/15/2020 indicated normal hearing through 2000 Hz, sloping to a mild sensorineural hearing loss from 0552-5098 Hz, rising back to normal hearing at 8000 Hz in the right ear.  In the left ear, testing indicated normal/borderline normal hearing except at 6000 Hz, where there was a mild likely sensorineural hearing loss.  The patient denies any hearing changes since that time.  He denies tinnitus, ear pain, drainage from the ears, aural fullness, dizziness, history of ear surgery, or history of noise exposure.  He has neuropathy and denies falls.      OBJECTIVE:  Abuse Screening:  Do you feel unsafe at home or work/school? No  Do you feel threatened by someone? No  Does anyone try to keep you from having contact with others, or doing things outside of your home? No  Physical signs of abuse present? No     Fall Risk Screen:  1. Have you fallen two or more times in the past year? No  2. Have you fallen and had an injury in the past year? No  Is patient a fall risk? No  Referral initiated: No  Fall Risk Assessment Completed by Audiology    Otoscopic exam indicates ears are clear of cerumen bilaterally.     Pure Tone Thresholds assessed using conventional audiometry with good reliability from 250-8000 Hz bilaterally using circumaural headphones and rechecked with insert earphones.      RIGHT: Normal hearing, except from 5754-3443 Hz, where there is a mild sensorineural hearing loss.       LEFT:  Normal/borderline normal hearing, except at 6000 Hz,  where there is a mild presumed sensorineural hearing loss.      Tympanogram:    RIGHT: Shallow    LEFT: Shallow  Consistent with 1/15/2020 results    Reflexes (reported by stimulus ear):  RIGHT: Ipsilateral is present at normal levels  RIGHT: Contralateral is present at normal levels  LEFT:   Ipsilateral is present at normal levels  LEFT:   Contralateral is present at normal levels    Speech Reception Threshold:    RIGHT: 15 dB HL    LEFT:   10 dB HL  Word Recognition Score:     RIGHT: 100% at 55 dB HL using NU-6 recorded word list.    LEFT:   100% at 55 dB HL using NU-6 recorded word list.    ASSESSMENT:   Sensorineural hearing loss in high frequencies bilaterally (stable compared to 1/15/2020 results)  Slightly shallow tympanograms but this may be normal for the patient, given that his acoustic reflexes are present and there are no air bone gaps on the audiogram.  Tympanograms are consistent with previous results from 1/15/2020.      Today s results were discussed with the patient in detail.     PLAN:    1. Patient to follow up with Sonia Love PA-C regarding today's results.  2. Hearing should be rechecked in 1-2 years, sooner if changes are noted or if re-evaluation is recommended by his physicians/care team.     The patient expressed understanding and agreement with this plan.    Anisha Ruby, CCC-A, [facility]  Licensed Audiologist  [place] #[id number]    Enclosure: audiogram    Cc Sonia Love PA-C

## 2023-10-05 ENCOUNTER — OFFICE VISIT (OUTPATIENT)
Dept: AUDIOLOGY | Facility: CLINIC | Age: 63
End: 2023-10-05
Attending: PHYSICIAN ASSISTANT
Payer: COMMERCIAL

## 2023-10-05 DIAGNOSIS — G93.0 ARACHNOID CYST: ICD-10-CM

## 2023-10-05 DIAGNOSIS — H90.3 SENSORINEURAL HEARING LOSS (SNHL) OF BOTH EARS: Primary | ICD-10-CM

## 2023-10-05 PROCEDURE — 92550 TYMPANOMETRY & REFLEX THRESH: CPT | Performed by: AUDIOLOGIST-HEARING AID FITTER

## 2023-10-05 PROCEDURE — 92557 COMPREHENSIVE HEARING TEST: CPT | Performed by: AUDIOLOGIST-HEARING AID FITTER

## 2023-11-02 ENCOUNTER — OFFICE VISIT (OUTPATIENT)
Dept: FAMILY MEDICINE | Facility: CLINIC | Age: 63
End: 2023-11-02
Payer: COMMERCIAL

## 2023-11-02 VITALS
SYSTOLIC BLOOD PRESSURE: 137 MMHG | DIASTOLIC BLOOD PRESSURE: 93 MMHG | WEIGHT: 165 LBS | HEIGHT: 71 IN | BODY MASS INDEX: 23.1 KG/M2 | HEART RATE: 67 BPM | TEMPERATURE: 97.3 F | RESPIRATION RATE: 15 BRPM | OXYGEN SATURATION: 97 %

## 2023-11-02 DIAGNOSIS — N32.89 BLADDER IRRITABILITY: Primary | ICD-10-CM

## 2023-11-02 LAB
ALBUMIN UR-MCNC: NEGATIVE MG/DL
APPEARANCE UR: CLEAR
BILIRUB UR QL STRIP: NEGATIVE
COLOR UR AUTO: YELLOW
GLUCOSE UR STRIP-MCNC: NEGATIVE MG/DL
HGB UR QL STRIP: NEGATIVE
KETONES UR STRIP-MCNC: NEGATIVE MG/DL
LEUKOCYTE ESTERASE UR QL STRIP: NEGATIVE
NITRATE UR QL: NEGATIVE
PH UR STRIP: 7 [PH] (ref 5–7)
SP GR UR STRIP: 1.01 (ref 1–1.03)
UROBILINOGEN UR STRIP-ACNC: 0.2 E.U./DL

## 2023-11-02 ASSESSMENT — ASTHMA QUESTIONNAIRES
QUESTION_4 LAST FOUR WEEKS HOW OFTEN HAVE YOU USED YOUR RESCUE INHALER OR NEBULIZER MEDICATION (SUCH AS ALBUTEROL): NOT AT ALL
ACT_TOTALSCORE: 25
QUESTION_5 LAST FOUR WEEKS HOW WOULD YOU RATE YOUR ASTHMA CONTROL: COMPLETELY CONTROLLED
QUESTION_3 LAST FOUR WEEKS HOW OFTEN DID YOUR ASTHMA SYMPTOMS (WHEEZING, COUGHING, SHORTNESS OF BREATH, CHEST TIGHTNESS OR PAIN) WAKE YOU UP AT NIGHT OR EARLIER THAN USUAL IN THE MORNING: NOT AT ALL
QUESTION_1 LAST FOUR WEEKS HOW MUCH OF THE TIME DID YOUR ASTHMA KEEP YOU FROM GETTING AS MUCH DONE AT WORK, SCHOOL OR AT HOME: NONE OF THE TIME
ACT_TOTALSCORE: 25
QUESTION_2 LAST FOUR WEEKS HOW OFTEN HAVE YOU HAD SHORTNESS OF BREATH: NOT AT ALL

## 2023-11-02 NOTE — NURSING NOTE
"63 year old  Chief Complaint   Patient presents with    UTI     Pt reports needing to urinate 15x yesterday. Previously had bladder infection.        Blood pressure (!) 137/93, pulse 67, temperature 97.3  F (36.3  C), temperature source Skin, resp. rate 15, height 1.803 m (5' 11\"), weight 74.8 kg (165 lb), SpO2 97%. Body mass index is 23.01 kg/m .  Patient Active Problem List   Diagnosis    WALDEMAR (obstructive sleep apnea)    Anosmia    Neuropathy       Wt Readings from Last 2 Encounters:   11/02/23 74.8 kg (165 lb)   09/28/23 73.9 kg (163 lb)     BP Readings from Last 3 Encounters:   11/02/23 (!) 137/93   09/28/23 125/84   07/25/23 113/76         Current Outpatient Medications   Medication    albuterol (PROAIR HFA/PROVENTIL HFA/VENTOLIN HFA) 108 (90 Base) MCG/ACT inhaler    sildenafil (VIAGRA) 100 MG tablet    Vitamin D, Cholecalciferol, 25 MCG (1000 UT) CAPS     No current facility-administered medications for this visit.       Social History     Tobacco Use    Smoking status: Never    Smokeless tobacco: Former     Types: Chew     Quit date: 6/24/1994   Vaping Use    Vaping Use: Never used   Substance Use Topics    Alcohol use: Yes     Comment: 7-14 drinks per week    Drug use: No       Health Maintenance Due   Topic Date Due    ASTHMA ACTION PLAN  Never done    RSV VACCINE 60+ (1 - 1-dose 60+ series) Never done    YEARLY PREVENTIVE VISIT  07/02/2022    DTAP/TDAP/TD IMMUNIZATION (2 - Td or Tdap) 03/08/2023       No results found for: \"PAP\"      November 2, 2023 9:13 AM    "

## 2023-11-02 NOTE — PROGRESS NOTES
"  Assessment & Plan   Problem List Items Addressed This Visit    None  Visit Diagnoses       Bladder irritability    -  Primary    Relevant Orders    Urinalysis Macroscopic (Completed)           U/A clear. Advised Tommy if symptoms should worsen acutely over the next week he can contact me here and I would consider an insurance Rx.     26 minutes spent on the date of the encounter doing chart review, history and exam, documentation and further activities as noted.    MD MAHIN Graves [facility]    Subjective   Tommy is a 63 year old, presenting for the following health issues:  UTI (Pt reports needing to urinate 15x yesterday. Previously had bladder infection. )      HPI   Possible UTI  - increased urinary frequency overnight: \"peed 6 times\"  - no fever, chills, nausea, flank pain, body aches  - he's heading up to [place] this weekend and doesn't want to get stuck up there with a bad UTI  - he still has some ciprofloxacin he started with his last UTI he can start if he needs to      Review of Systems   Constitutional, HEENT, cardiovascular, pulmonary, gi and gu systems are negative, except as otherwise noted.      Objective    BP (!) 137/93 (BP Location: Left arm, Patient Position: Sitting, Cuff Size: Adult Large)   Pulse 67   Temp 97.3  F (36.3  C) (Skin)   Resp 15   Ht 1.803 m (5' 11\")   Wt 74.8 kg (165 lb)   SpO2 97%   BMI 23.01 kg/m    Body mass index is 23.01 kg/m .  Physical Exam   GENERAL: healthy, alert and no distress  NECK: no adenopathy, no asymmetry, masses, or scars and thyroid normal to palpation  RESP: lungs clear to auscultation - no rales, rhonchi or wheezes  CV: regular rate and rhythm, normal S1 S2, no S3 or S4, no murmur, click or rub, no peripheral edema and peripheral pulses strong  ABDOMEN: soft, nontender, no hepatosplenomegaly, no masses and bowel sounds normal  MS: no gross musculoskeletal defects noted, no edema                "

## 2024-03-01 NOTE — PROGRESS NOTES
Assessment & Plan   Problem List Items Addressed This Visit    None  Visit Diagnoses       Lipid screening    -  Primary    Relevant Orders    Lipid panel reflex to direct LDL Non-fasting    Acute prostatitis        Relevant Orders    Prostate spec antigen screen    Screening for prostate cancer        Screen for colon cancer        Relevant Orders    Colonoscopy Screening  Referral           Possibly kidney stones vs constipation vs musculoskeletal strain. Much less likely infection. For now, Tommy is OK with continuing to monitor symptoms. I would consider imaging, likely MRI if symptoms fail to improve. Possibly labs depending symptom development.     Agreed to health maintenance labs and colonoscopy as noted as well. Will follow these up as indicated.     26 minutes spent on the date of the encounter doing chart review, history and exam, documentation and further activities as noted.    Quentin Ahs MD  11:20 AM, March 4, 2024      Subjective   Tommy is a 63 year old, presenting for the following health issues:  Musculoskeletal Problem (Back pain -- on left side of spine in low/middle back. Ongoing for about 2 months. Stretches often, doesn't feel skeletal or muscular. ) and Follow Up (Last colonoscopy at 51, everything was fine. Has done a cologuard in the past, partner would like him to schedule another colonoscopy. Lipid panel. In the past had a bladder infection that turned into a prostate infection, psa levels were going down -- would like a recheck.)    HPI   Back Pain  - middle back, LEFT side  - going on for about the last 2 months  - feels it when there is pressure on the area, like when he is driving and his seat is pushing at the area  - denies injury to the area  - no fever, chills nausea  - no dysuria or hematuria  - does note some increased issues with constipation, not so much straining with hard BMs, but less frequent BMs        Review of Systems  Constitutional, HEENT,  cardiovascular, pulmonary, gi and gu systems are negative, except as otherwise noted.      Objective    BP (!) 139/90 (BP Location: Left arm, Patient Position: Sitting, Cuff Size: Adult Large)   Pulse 61   Temp (!) 96.4  F (35.8  C) (Temporal)   Resp 17   Wt 73.7 kg (162 lb 6.4 oz)   SpO2 100%   BMI 22.65 kg/m    Body mass index is 22.65 kg/m .  Physical Exam   GENERAL: alert and no distress  NECK: no adenopathy, no asymmetry, masses, or scars  RESP: lungs clear to auscultation - no rales, rhonchi or wheezes  CV: regular rate and rhythm, normal S1 S2, no S3 or S4, no murmur, click or rub, no peripheral edema  ABDOMEN: soft, nontender, no hepatosplenomegaly, no masses and bowel sounds normal  MS: minimal tenderness to palpation, paraspinal, superior to kidneys, on LEFT side. Otherwise, no gross musculoskeletal defects noted, no edema            Signed Electronically by: Quentin Ash MD

## 2024-03-04 ENCOUNTER — OFFICE VISIT (OUTPATIENT)
Dept: FAMILY MEDICINE | Facility: CLINIC | Age: 64
End: 2024-03-04
Payer: COMMERCIAL

## 2024-03-04 VITALS
SYSTOLIC BLOOD PRESSURE: 139 MMHG | HEART RATE: 61 BPM | DIASTOLIC BLOOD PRESSURE: 90 MMHG | RESPIRATION RATE: 17 BRPM | TEMPERATURE: 96.4 F | OXYGEN SATURATION: 100 % | WEIGHT: 162.4 LBS | BODY MASS INDEX: 22.65 KG/M2

## 2024-03-04 DIAGNOSIS — Z13.220 LIPID SCREENING: Primary | ICD-10-CM

## 2024-03-04 DIAGNOSIS — Z12.5 SCREENING FOR PROSTATE CANCER: ICD-10-CM

## 2024-03-04 DIAGNOSIS — Z12.11 SCREEN FOR COLON CANCER: ICD-10-CM

## 2024-03-04 DIAGNOSIS — N41.0 ACUTE PROSTATITIS: ICD-10-CM

## 2024-03-04 LAB
CHOLEST SERPL-MCNC: 198 MG/DL
FASTING STATUS PATIENT QL REPORTED: NO
HDLC SERPL-MCNC: 77 MG/DL
LDLC SERPL CALC-MCNC: 108 MG/DL
NONHDLC SERPL-MCNC: 121 MG/DL
PSA SERPL DL<=0.01 NG/ML-MCNC: 1.29 NG/ML (ref 0–4.5)
TRIGL SERPL-MCNC: 65 MG/DL

## 2024-03-04 PROCEDURE — G0103 PSA SCREENING: HCPCS | Mod: ORL | Performed by: FAMILY MEDICINE

## 2024-03-04 PROCEDURE — 80061 LIPID PANEL: CPT | Mod: ORL | Performed by: FAMILY MEDICINE

## 2024-03-04 NOTE — NURSING NOTE
63 year old  Chief Complaint   Patient presents with    Musculoskeletal Problem     Back pain -- on left side of spine in low/middle back. Ongoing for about 2 months. Stretches often, doesn't feel skeletal or muscular.     Follow Up     Last colonoscopy at 51, everything was fine. Has done a cologuard in the past, partner would like him to schedule another colonoscopy. Lipid panel. In the past had a bladder infection that turned into a prostate infection, psa levels were going down -- would like a recheck.       Blood pressure (!) 139/90, pulse 61, temperature (!) 96.4  F (35.8  C), temperature source Temporal, resp. rate 17, weight 73.7 kg (162 lb 6.4 oz), SpO2 100%. Body mass index is 22.65 kg/m .  Patient Active Problem List   Diagnosis    WALDEMAR (obstructive sleep apnea)    Anosmia    Neuropathy       Wt Readings from Last 2 Encounters:   03/04/24 73.7 kg (162 lb 6.4 oz)   11/02/23 74.8 kg (165 lb)     BP Readings from Last 3 Encounters:   03/04/24 (!) 139/90   11/02/23 (!) 137/93   09/28/23 125/84         Current Outpatient Medications   Medication    albuterol (PROAIR HFA/PROVENTIL HFA/VENTOLIN HFA) 108 (90 Base) MCG/ACT inhaler    sildenafil (VIAGRA) 100 MG tablet    Vitamin D, Cholecalciferol, 25 MCG (1000 UT) CAPS     No current facility-administered medications for this visit.       Social History     Tobacco Use    Smoking status: Never    Smokeless tobacco: Former     Types: Chew     Quit date: 6/24/1994   Vaping Use    Vaping Use: Never used   Substance Use Topics    Alcohol use: Yes     Comment: 7-14 drinks per week    Drug use: No       Health Maintenance Due   Topic Date Due    ASTHMA ACTION PLAN  Never done    RSV VACCINE (Pregnancy & 60+) (1 - 1-dose 60+ series) Never done    YEARLY PREVENTIVE VISIT  07/02/2022    Pneumococcal Vaccine: Pediatrics (0 to 5 Years) and At-Risk Patients (6 to 64 Years) (2 of 2 - PCV) 07/02/2022    DTAP/TDAP/TD IMMUNIZATION (2 - Td or Tdap) 03/08/2023       No results found  "for: \"PAP\"      March 4, 2024 10:36 AM    "

## 2024-04-24 ENCOUNTER — MYC REFILL (OUTPATIENT)
Dept: FAMILY MEDICINE | Facility: CLINIC | Age: 64
End: 2024-04-24

## 2024-04-24 DIAGNOSIS — R53.83 LACK OF STAMINA: ICD-10-CM

## 2024-04-24 DIAGNOSIS — R06.09 DYSPNEA ON EXERTION: ICD-10-CM

## 2024-04-24 RX ORDER — ALBUTEROL SULFATE 90 UG/1
2 AEROSOL, METERED RESPIRATORY (INHALATION) EVERY 4 HOURS PRN
Qty: 18 G | Refills: 3 | Status: SHIPPED | OUTPATIENT
Start: 2024-04-24

## 2024-04-24 NOTE — TELEPHONE ENCOUNTER
Medication requested: albuterol (PROAIR HFA/PROVENTIL HFA/VENTOLIN HFA) 108 (90 Base) MCG/ACT inhaler   Last office visit: 3/4/2024  [facility] appointments: none  Medication last refilled: 2/28/2022; 18 g + 3 refills  Last qualifying labs:      11/2/2023  9:11 AM   ACT and ATAQ Scores    ACT TOTAL SCORE (Goal Greater than or Equal to 20) 25      Prescription approved per [facility] Refill Protocol.    SERGEI Wright, RN  04/24/24, 9:58 AM

## 2024-07-02 ENCOUNTER — TELEPHONE (OUTPATIENT)
Dept: GASTROENTEROLOGY | Facility: CLINIC | Age: 64
End: 2024-07-02
Payer: COMMERCIAL

## 2024-07-02 NOTE — TELEPHONE ENCOUNTER
"Endoscopy Scheduling Screen    Have you had a positive Covid test in the last 14 days?  No    What is your communication preference for Instructions and/or Bowel Prep?   MyChart    What insurance is in the chart?  Other:  [facility]    Ordering/Referring Provider: Quentin Ash MD   (If ordering provider performs procedure, schedule with ordering provider unless otherwise instructed. )    BMI: Estimated body mass index is 22.65 kg/m  as calculated from the following:    Height as of 11/2/23: 1.803 m (5' 11\").    Weight as of 3/4/24: 73.7 kg (162 lb 6.4 oz).     Sedation Ordered  moderate sedation.   If patient BMI > 50 do not schedule in ASC.    If patient BMI > 45 do not schedule at [facility].    Are you taking methadone or Suboxone?  No    Have you had difficulties, pain, or discomfort during past endoscopy procedures?  No    Are you taking any prescription medications for pain 3 or more times per week?   NO, No RN review required.    Do you have a history of malignant hyperthermia?  No    (Females) Are you currently pregnant?   No     Have you been diagnosed or told you have pulmonary hypertension?   No    Do you have an LVAD?  No    Have you been told you have moderate to severe sleep apnea?  Yes (RN Review required for scheduling unless scheduling in Hospital.)    Have you been told you have COPD, asthma, or any other lung disease?  No    Do you have any heart conditions?  No     Have you ever had or are you waiting for an organ transplant?  No. Continue scheduling, no site restrictions.    Have you had a stroke or transient ischemic attack (TIA aka \"mini stroke\" in the last 6 months?   No    Have you been diagnosed with or been told you have cirrhosis of the liver?   No    Are you currently on dialysis?   No    Do you need assistance transferring?   No    BMI: Estimated body mass index is 22.65 kg/m  as calculated from the following:    Height as of 11/2/23: 1.803 m (5' 11\").    Weight as of 3/4/24: 73.7 kg (162 lb 6.4 " oz).     Is patients BMI > 40 and scheduling location [facility]?  No    Do you take an injectable medication for weight loss or diabetes (excluding insulin)?  No    Do you take the medication Naltrexone?  No    Do you take blood thinners?  No       Prep   Are you currently on dialysis or do you have chronic kidney disease?  No    Do you have a diagnosis of diabetes?  No    Do you have a diagnosis of cystic fibrosis (CF)?  No    On a regular basis do you go 3 -5 days between bowel movements?  No    BMI > 40?  No    Preferred Pharmacy:        [id number] - [facility] [address] [facility] [address]  [facility] [address]  Phone: [phone] Fax: [phone]      Final Scheduling Details     Procedure scheduled  Colonoscopy    Surgeon:  Caro     Date of procedure:  09/19/2024      Pre-OP / PAC:   No - Not required for this site.    Location  SH - Patient preference.    Sedation   Moderate Sedation - Per order.      Patient Reminders:   You will receive a call from a Nurse to review instructions and health history.  This assessment must be completed prior to your procedure.  Failure to complete the Nurse assessment may result in the procedure being cancelled.      On the day of your procedure, please designate an adult(s) who can drive you home stay with you for the next 24 hours. The medicines used in the exam will make you sleepy. You will not be able to drive.      You cannot take public transportation, ride share services, or non-medical taxi service without a responsible caregiver.  Medical transport services are allowed with the requirement that a responsible caregiver will receive you at your destination.  We require that drivers and caregivers are confirmed prior to your procedure.

## 2024-08-21 ENCOUNTER — TELEPHONE (OUTPATIENT)
Dept: GASTROENTEROLOGY | Facility: CLINIC | Age: 64
End: 2024-08-21
Payer: MEDICARE

## 2024-08-21 NOTE — TELEPHONE ENCOUNTER
Caller: Tommy    Reason for Reschedule/Cancellation   (please be detailed, any staff messages or encounters to note?): Patient has conflict      Prior to reschedule please review:  Ordering Provider: Mihir  Sedation Determined: CS  Does patient have any ASC Exclusions, please identify?: Yes-WALDEMAR      Notes on Cancelled Procedure:  Procedure: Lower Endoscopy [Colonoscopy]   Date: 9/19/24  Location: [facility]; [address] [place] [address]   Surgeon: Caro      Rescheduled: Yes,   Procedure: Lower Endoscopy [Colonoscopy]    Date: 10/31/24   Location: [facility]; [address] [place] [address]    Surgeon: Caro   Sedation Level Scheduled  CS ,  Reason for Sedation Level Protocol   Instructions updated and sent: Yes     Does patient need PAC or Pre -Op Rescheduled? : N       Did you cancel or rescheduled an EUS procedure? No.

## 2024-08-22 ENCOUNTER — TELEPHONE (OUTPATIENT)
Dept: NEUROSURGERY | Facility: CLINIC | Age: 64
End: 2024-08-22
Payer: MEDICARE

## 2024-08-22 NOTE — TELEPHONE ENCOUNTER
Left Voicemail (1st Attempt) and Sent Mychart (1st Attempt) for the patient to call back and schedule the following:    Appointment type: Return Adult Neurosurg- in person or virtual  Provider: Sonia Love  Return date: Next available- Pt overdue   Specialty phone number: [phone]  Additional appointment(s) needed: MRI brain, prior  Additonal Notes: 1 yr follow up/ MRI prior    Pt overdue for follow up    Yasmine Calderón on 8/22/2024 at 12:15 PM

## 2024-10-10 ENCOUNTER — MYC MEDICAL ADVICE (OUTPATIENT)
Dept: GASTROENTEROLOGY | Facility: CLINIC | Age: 64
End: 2024-10-10
Payer: MEDICARE

## 2024-10-19 ENCOUNTER — TELEPHONE (OUTPATIENT)
Dept: GASTROENTEROLOGY | Facility: CLINIC | Age: 64
End: 2024-10-19
Payer: MEDICARE

## 2024-10-19 NOTE — TELEPHONE ENCOUNTER
Pre visit planning completed.      Procedure details:    Patient scheduled for Colonoscopy on 10/31/2024.     Arrival time: 0745. Procedure time 0830    Facility location: [facility]; [address] [place] [address] [place] [address]. Check in location: 1st [facility] [place].     Sedation type: Conscious sedation     Pre op exam needed? No.    Indication for procedure: Screening      Chart review:     Electronic implanted devices? No    Recent diagnosis of diverticulitis within the last 6 weeks? No      Medication review:    Diabetic? No    Anticoagulants? No    Weight loss medication/injectable? No GLP-1 medication per patient's medication list.  RN will verify with pre-assessment call.    Other medication HOLDING recommendations:  N/A      Prep for procedure:     Bowel prep recommendation: Standard Miralax  Due to: standard bowel prep.    Prep instructions sent via [employer]       Meredith Duran RN  Endoscopy Procedure Pre Assessment RN  [phone] option 2

## 2024-10-21 NOTE — TELEPHONE ENCOUNTER
Attempted to contact patient in order to complete pre assessment questions.     No answer. Left message to return call to [phone] option 2.    Callback communication sent via [employer].    Meredith Duran RN

## 2024-10-22 NOTE — TELEPHONE ENCOUNTER
Pre assessment completed for upcoming procedure.   (Please see previous telephone encounter notes for complete details)    Patient  returned call.       Procedure details:    Arrival time and facility location reviewed.    Pre op exam needed? No.    Designated  policy reviewed. Instructed to have someone stay 6  hours post procedure.       Medication review:    Medications reviewed. Please see supporting documentation below. Holding recommendations discussed (if applicable).       Prep for procedure:     Procedure prep instructions reviewed.        Any additional information needed:  N/A      Patient  verbalized understanding and had no questions or concerns at this time.      Emily Trujillo RN  Endoscopy Procedure Pre Assessment   [phone] option 2

## 2024-10-31 ENCOUNTER — HOSPITAL ENCOUNTER (OUTPATIENT)
Facility: CLINIC | Age: 64
Discharge: HOME | End: 2024-10-31
Attending: COLON & RECTAL SURGERY | Admitting: COLON & RECTAL SURGERY
Payer: MEDICARE

## 2024-10-31 VITALS
WEIGHT: 160 LBS | OXYGEN SATURATION: 96 % | DIASTOLIC BLOOD PRESSURE: 81 MMHG | RESPIRATION RATE: 55 BRPM | HEIGHT: 71 IN | SYSTOLIC BLOOD PRESSURE: 117 MMHG | BODY MASS INDEX: 22.4 KG/M2 | HEART RATE: 56 BPM

## 2024-10-31 LAB — COLONOSCOPY: NORMAL

## 2024-10-31 PROCEDURE — 88305 TISSUE EXAM BY PATHOLOGIST: CPT | Mod: TC | Performed by: COLON & RECTAL SURGERY

## 2024-10-31 PROCEDURE — 250N000011 HC RX IP 250 OP 636: Performed by: COLON & RECTAL SURGERY

## 2024-10-31 PROCEDURE — 45385 COLONOSCOPY W/LESION REMOVAL: CPT | Performed by: COLON & RECTAL SURGERY

## 2024-10-31 PROCEDURE — G0500 MOD SEDAT ENDO SERVICE >5YRS: HCPCS | Performed by: COLON & RECTAL SURGERY

## 2024-10-31 RX ORDER — LIDOCAINE 40 MG/G
CREAM TOPICAL
Status: CANCELLED | OUTPATIENT
Start: 2024-10-31

## 2024-10-31 RX ORDER — FENTANYL CITRATE 50 UG/ML
INJECTION, SOLUTION INTRAMUSCULAR; INTRAVENOUS PRN
Status: DISCONTINUED | OUTPATIENT
Start: 2024-10-31 | End: 2024-10-31 | Stop reason: HOSPADM

## 2024-10-31 RX ORDER — ONDANSETRON 2 MG/ML
4 INJECTION INTRAMUSCULAR; INTRAVENOUS
Status: CANCELLED | OUTPATIENT
Start: 2024-10-31

## 2024-10-31 ASSESSMENT — ACTIVITIES OF DAILY LIVING (ADL)
ADLS_ACUITY_SCORE: 0
ADLS_ACUITY_SCORE: 0

## 2024-10-31 NOTE — H&P
Colon & Rectal Surgery History and Physical  Pre-Endoscopy Procedure Note    History of Present Illness   I have been asked by   to evaluate this 64 year old male for colorectal cancer screening. He currently denies any abdominal pain, weight loss, bleeding per rectum, or recent change in bowel habits.    Past Medical History  Diagnosis Date    Chronic cough     Due to dairy and soy allergies    Sleep apnea     Uncomplicated asthma        Past Surgical History  Procedure Laterality Date    Vasectomy          Medications  Medications Prior to Admission   Medication Sig    albuterol (PROAIR HFA/PROVENTIL HFA/VENTOLIN HFA) 108 (90 Base) MCG/ACT inhaler Inhale 2 puffs into the lungs every 4 hours as needed for shortness of breath or wheezing    sildenafil (VIAGRA) 100 MG tablet Take 1 tablet (100 mg) by mouth daily as needed (ED) Take 30min- 4 hrs before intercourse.No use with nitroglycerin, terazosin or doxazosin.    Vitamin D, Cholecalciferol, 25 MCG (1000 UT) CAPS        Allergies  Allergen Reactions    Lactase-Lactobacillus Cough    Soy Allergy         Family History   Family history includes Diabetes in an other family member; Hyperlipidemia in his father; Idiopathic pulmonary fibrosis in his father; Pancreatic Cancer in his maternal grandmother; Sleep Apnea in his mother.     Social History   He reports that he has never smoked. He quit smokeless tobacco use about 30 years ago.  His smokeless tobacco use included chew. He reports current alcohol use. He reports that he does not use drugs.    Review of Systems   Constitutional:  No fever, weight change or fatigue.    Eyes:     No dry eyes or vision changes.   Ears/Nose/Throat/Neck:  No oral ulcers, sore throat or voice change.    Cardiovascular:   No palpitations, syncope, angina or edema.   Respiratory:    No chest pain, excessive sleepiness, shortness of breath or hemoptysis.    Gastrointestinal:   No abdominal pain, nausea, vomiting, diarrhea or  "heartburn.    Genitourinary:   No dysuria, hematuria, urinary retention or urinary frequency.   Musculoskeletal:  No joint swelling or arthralgias.    Dermatologic:  No skin rash or other skin changes.   Neurologic:    No focal weakness or numbness. No neuropathy.   Psychiatric:    No depression, anxiety, suicidal ideation, or paranoid ideation.   Endocrine:   No cold or heat intolerance, polydipsia, hirsutism, change in libido, or flushing.   Hematology/Lymphatic:  No bleeding or lymphadenopathy.    Allergy/Immunology:  No rhinitis or hives.     Physical Exam   Vitals:  /72 RR 12 HR 64 SpO2 99% room air Height 1.803 m (5' 11\"), weight 72.6 kg (160 lb).    General:  Alert and oriented to person, place and time   Airway: Normal oropharyngeal airway and neck mobility   Lungs:  Clear bilaterally   Heart:  Regular rate and rhythm   Abdomen: Soft, NT, ND, no masses   Extremities: Warm, good capillary refill    ASA Grade: II (mild systemic disease)    Impression: Cleared for use of conscious sedation for colorectal cancer screening    Plan: Proceed with colonoscopy     Yaquelin Elizondo MD  [facility]  [phone]  "

## 2024-11-01 LAB
PATH REPORT.COMMENTS IMP SPEC: NORMAL
PATH REPORT.COMMENTS IMP SPEC: NORMAL
PATH REPORT.FINAL DX SPEC: NORMAL
PATH REPORT.GROSS SPEC: NORMAL
PATH REPORT.MICROSCOPIC SPEC OTHER STN: NORMAL
PATH REPORT.RELEVANT HX SPEC: NORMAL
PHOTO IMAGE: NORMAL

## 2024-11-01 PROCEDURE — 88305 TISSUE EXAM BY PATHOLOGIST: CPT | Mod: 26 | Performed by: PATHOLOGY

## 2024-11-10 ENCOUNTER — HEALTH MAINTENANCE LETTER (OUTPATIENT)
Age: 64
End: 2024-11-10

## 2024-11-14 ENCOUNTER — PATIENT OUTREACH (OUTPATIENT)
Dept: GASTROENTEROLOGY | Facility: CLINIC | Age: 64
End: 2024-11-14
Payer: MEDICARE

## 2024-11-21 ENCOUNTER — TELEPHONE (OUTPATIENT)
Dept: NEUROSURGERY | Facility: CLINIC | Age: 64
End: 2024-11-21
Payer: MEDICARE

## 2024-11-21 NOTE — TELEPHONE ENCOUNTER
Left Voicemail (1st Attempt) and Sent Mychart (1st Attempt) for the patient to call back and schedule the following:    Appointment type: Return adult neurosg  Provider: Sonia Love in person or virtual  Return date: First available  Specialty phone number: [phone]  Additional appointment(s) needed: MRI prior  Additonal Notes:  pt is overdue for 1 year follow up

## 2024-11-25 ENCOUNTER — TELEPHONE (OUTPATIENT)
Dept: NEUROSURGERY | Facility: CLINIC | Age: 64
End: 2024-11-25
Payer: MEDICARE

## 2024-11-25 NOTE — TELEPHONE ENCOUNTER
Left Voicemail (2nd Attempt) for the patient to call back and schedule the following:    Appointment type: Rtn adult neurosurg - in person or virtual  Provider: Sonia Love  Return date: Next available after the MRI  Specialty phone number: [phone]  Additional appointment(s) needed: MRI prior  Additonal Notes: Arachnoid cyst/ MRI prior

## 2025-03-18 ENCOUNTER — OFFICE VISIT (OUTPATIENT)
Dept: FAMILY MEDICINE | Facility: CLINIC | Age: 65
End: 2025-03-18
Payer: MEDICARE

## 2025-03-18 VITALS
SYSTOLIC BLOOD PRESSURE: 134 MMHG | HEIGHT: 71 IN | OXYGEN SATURATION: 98 % | HEART RATE: 61 BPM | TEMPERATURE: 97.4 F | BODY MASS INDEX: 22.82 KG/M2 | WEIGHT: 163 LBS | DIASTOLIC BLOOD PRESSURE: 81 MMHG | RESPIRATION RATE: 16 BRPM

## 2025-03-18 DIAGNOSIS — Z12.5 SCREENING FOR PROSTATE CANCER: ICD-10-CM

## 2025-03-18 DIAGNOSIS — R53.83 LACK OF STAMINA: ICD-10-CM

## 2025-03-18 DIAGNOSIS — Z23 NEED FOR MMR VACCINE: ICD-10-CM

## 2025-03-18 DIAGNOSIS — Z23 NEED FOR VACCINATION: ICD-10-CM

## 2025-03-18 DIAGNOSIS — Z00.00 WELLNESS EXAMINATION: Primary | ICD-10-CM

## 2025-03-18 DIAGNOSIS — R06.09 DYSPNEA ON EXERTION: ICD-10-CM

## 2025-03-18 DIAGNOSIS — R39.9 LOWER URINARY TRACT SYMPTOMS (LUTS): ICD-10-CM

## 2025-03-18 PROCEDURE — 84153 ASSAY OF PSA TOTAL: CPT | Mod: ORL | Performed by: FAMILY MEDICINE

## 2025-03-18 RX ORDER — ALBUTEROL SULFATE 90 UG/1
2 INHALANT RESPIRATORY (INHALATION) EVERY 4 HOURS PRN
Qty: 18 G | Refills: 3 | Status: SHIPPED | OUTPATIENT
Start: 2025-03-18

## 2025-03-18 SDOH — HEALTH STABILITY: PHYSICAL HEALTH: ON AVERAGE, HOW MANY DAYS PER WEEK DO YOU ENGAGE IN MODERATE TO STRENUOUS EXERCISE (LIKE A BRISK WALK)?: 6 DAYS

## 2025-03-18 ASSESSMENT — SOCIAL DETERMINANTS OF HEALTH (SDOH): HOW OFTEN DO YOU GET TOGETHER WITH FRIENDS OR RELATIVES?: TWICE A WEEK

## 2025-03-18 NOTE — PROGRESS NOTES
Preventive Care Visit  [facility]  Quentin Ash MD, Family Medicine  Mar 18, 2025      Assessment & Plan   Problem List Items Addressed This Visit    None  Visit Diagnoses       Wellness examination    -  Primary    Screening for prostate cancer        Relevant Orders    PSA tumor marker    Lack of stamina        Relevant Medications    albuterol (PROAIR HFA/PROVENTIL HFA/VENTOLIN HFA) 108 (90 Base) MCG/ACT inhaler    Dyspnea on exertion        Relevant Medications    albuterol (PROAIR HFA/PROVENTIL HFA/VENTOLIN HFA) 108 (90 Base) MCG/ACT inhaler    Need for vaccination        Relevant Orders    Pneumococcal 20 Valent Conjugate (PCV20) (Completed)    MMR (M-M-R II) (Completed)    Need for MMR vaccine        Relevant Medications    Measles, Mumps & Rubella Vac (MMR) injection    Lower urinary tract symptoms (LUTS)               I did discuss with Tommy that his insurance will likely not cover the vaccines administered today. He is OK with paying for these out of pocket.     Tommy continues to report chronic issues that we have spoken of before. Some loss of balance, peripheral neuropathy in feet, loss of smell. He has met with neurology about this mostly due to his concerns that he may be developing Parkinson's. He has been advised by two separate neurologists who have reassured him he does not have Parkinson's.     Given report of LUTS, agreed to a retest of PSA today. Will follow this up. Did suggest a trial of finasteride. He tried flomax at some point previously and didn't like the way it made him feel. We talked about a possible referral to urology but Tommy defers today.     Patient has been advised of split billing requirements and indicates understanding: Yes       Counseling  Appropriate preventive services were addressed with this patient via screening, questionnaire, or discussion as appropriate for fall prevention, nutrition, physical activity, Tobacco-use cessation, social engagement, weight  loss and cognition.  Checklist reviewing preventive services available has been given to the patient.  Reviewed patient's diet, addressing concerns and/or questions.   The patient was instructed to see the dentist every 6 months.   Patient reported safety concerns were addressed today.The patient was provided with written information regarding signs of hearing loss.     Quentin Ash MD  4:48 PM, March 18, 2025        Valeri Sanders is a 64 year old, presenting for the following:  Physical (MMR booster? Sleep -- harder as getting older, notes dizziness. Roller skies and cycles, balance. Back -- pulled it out in January, PT? What do to prevent from recurring. Sciatic nerve -- constant annoyance, will PT help? Notes slight pain now.) and Urinary Problem (Had a UTI x2 years ago, PSA was wonky after, has been coming down. Rides bike often. Recheck.)         HPI  # Health Maintenance  - BP:   BP Readings from Last 3 Encounters:   03/18/25 134/81   10/31/24 117/81   03/04/24 (!) 139/90   - Cholesterol: previous labs reviewed  Recent Labs   Lab Test 03/04/24  1056   CHOL 198   HDL 77   *   TRIG 65   The 10-year ASCVD risk score (Bharath MULTANI, et al., 2019) is: 10.7%    Values used to calculate the score:      Age: 64 years      Sex: Male      Is Non- : No      Diabetic: No      Tobacco smoker: No      Systolic Blood Pressure: 134 mmHg      Is BP treated: No      HDL Cholesterol: 77 mg/dL      Total Cholesterol: 198 mg/dL  - Diabetes Screening: previous labs reviewed  - Lung Cancer Screening: not indicated  55-79yo w/30py smoking history and currently smoking OR quit within past 15 years:  Low dose CT annually and discontinued once a person has been 15 years tobacco free  - (+) seatbelt use, (+) helmet, (+) smoke detector  - Feels safe at home, denies verbal/physical/emotional abuse in past year: yes  - Colonoscopy: 2024 repeat in 2029    Neuro Issues  - previously discussed  - see A&P above      LUTS  - weak stream  - increased frequency  - nocturia causing problems with sleep    Advance Care Planning  Patient does not have a Health Care Directive        3/18/2025   General Health   How would you rate your overall physical health? Excellent   Feel stress (tense, anxious, or unable to sleep) Only a little   (!) STRESS CONCERN      3/18/2025   Nutrition   Diet: Other   If other, please elaborate: no soy or dairy         3/18/2025   Exercise   Days per week of moderate/strenous exercise 6 days         3/18/2025   Social Factors   Frequency of gathering with friends or relatives Twice a week   Worry food won't last until get money to buy more No   Food not last or not have enough money for food? No   Do you have housing? (Housing is defined as stable permanent housing and does not include staying ouside in a car, in a tent, in an abandoned building, in an overnight shelter, or couch-surfing.) Yes   Are you worried about losing your housing? No   Lack of transportation? No   Unable to get utilities (heat,electricity)? No         3/18/2025   Fall Risk   Fallen 2 or more times in the past year? No   Trouble with walking or balance? No          3/18/2025   Activities of Daily Living- Home Safety   Needs help with the following daily activites None of the above   Safety concerns in the home No grab bars in the bathroom         3/18/2025   Dental   Dentist two times every year? (!) NO         3/18/2025   Hearing Screening   Hearing concerns? (!) IT'S HARD TO FOLLOW A CONVERSATION IN A NOISY RESTAURANT OR CROWDED ROOM.         3/18/2025   Driving Risk Screening   Patient/family members have concerns about driving No         3/18/2025   General Alertness/Fatigue Screening   Have you been more tired than usual lately? No         3/18/2025   Urinary Incontinence Screening   Bothered by leaking urine in past 6 months No           Today's PHQ-2 Score:       3/18/2025     3:19 PM   PHQ-2 ( 1999 Pfizer)   Q1: Little  interest or pleasure in doing things 0   Q2: Feeling down, depressed or hopeless 0   PHQ-2 Score 0    Q1: Little interest or pleasure in doing things Not at all   Q2: Feeling down, depressed or hopeless Not at all   PHQ-2 Score 0       Patient-reported           3/18/2025   Substance Use   Alcohol more than 3/day or more than 7/wk No   Do you have a current opioid prescription? No   How severe/bad is pain from 1 to 10? 1/10   Do you use any other substances recreationally? No     Social History     Tobacco Use    Smoking status: Never    Smokeless tobacco: Former     Types: Chew     Quit date: 6/24/1994   Vaping Use    Vaping status: Never Used   Substance Use Topics    Alcohol use: Yes     Comment: 7-14 drinks per week    Drug use: No           3/18/2025   AAA Screening   Family history of Abdominal Aortic Aneurysm (AAA)? No   Last PSA:   PSA   Date Value Ref Range Status   07/02/2021 1.49 0 - 4 ug/L Final     Comment:     Assay Method:  Chemiluminescence using Siemens Vista analyzer     Prostate Specific Antigen Screen   Date Value Ref Range Status   03/04/2024 1.29 0.00 - 4.50 ng/mL Final     PSA Tumor Marker   Date Value Ref Range Status   07/25/2023 2.24 0.00 - 4.50 ng/mL Final   04/28/2022 1.26 0.00 - 4.00 ug/L Final       Past Medical History:   Diagnosis Date    Chronic cough     Due to dairy and soy allergies.    Sleep apnea     Uncomplicated asthma      Past Surgical History:   Procedure Laterality Date    COLONOSCOPY N/A 10/31/2024    Procedure: Colonoscopy;  Surgeon: Yaquelin Elizondo MD;  Location: [facility]    vasectomy       Current providers sharing in care for this patient include:  Patient Care Team:  Quentin Ash MD as PCP - General (Family Medicine)  Shae Brumfield MD as MD (Internal Medicine)  Jose Manuel Head DO as MD (Gastroenterology)  Quentin Ash MD as Assigned PCP  Javier Grey MD as MD (Neurology)  Caroline Lindquist PA-C as Physician Assistant (Urology)  Tolu  "Elmira Hanna as Audiologist (Audiology)    The following health maintenance items are reviewed in Epic and correct as of today:  Health Maintenance   Topic Date Due    Pneumococcal Vaccine: 50+ Years (2 of 2 - PCV) 07/02/2022    DTAP/TDAP/TD IMMUNIZATION (2 - Td or Tdap) 03/08/2023    MEDICARE ANNUAL WELLNESS VISIT  08/07/2024    COVID-19 Vaccine (7 - 2024-25 season) 07/04/2025    FALL RISK ASSESSMENT  03/18/2026    GLUCOSE  05/02/2026    ADVANCE CARE PLANNING  07/02/2026    LIPID  03/04/2029    COLORECTAL CANCER SCREENING  10/31/2029    RSV VACCINE (1 - 1-dose 75+ series) 08/07/2034    HEPATITIS C SCREENING  Completed    HIV SCREENING  Completed    PHQ-2 (once per calendar year)  Completed    INFLUENZA VACCINE  Completed    ZOSTER IMMUNIZATION  Completed    HPV IMMUNIZATION  Aged Out    MENINGITIS IMMUNIZATION  Aged Out     Family History   Problem Relation Age of Onset    Pancreatic Cancer Maternal Grandmother     Hyperlipidemia Father     Other - See Comments Father         idiopathic pulmonary fibrosis    Idiopathic pulmonary fibrosis Father     Other - See Comments Other         Etoh dependence, dad and brother    Diabetes Other         m unc, type I    Other - See Comments Mother 80        memory loss    Sleep Apnea Mother          Review of Systems  Constitutional, HEENT, cardiovascular, pulmonary, gi and gu systems are negative, except as otherwise noted.     Objective    Exam  /81 (BP Location: Left arm, Patient Position: Sitting, Cuff Size: Adult Regular)   Pulse 61   Temp 97.4  F (36.3  C) (Temporal)   Resp 16   Ht 1.81 m (5' 11.25\")   Wt 73.9 kg (163 lb)   SpO2 98%   BMI 22.57 kg/m     Estimated body mass index is 22.32 kg/m  as calculated from the following:    Height as of 10/31/24: 1.803 m (5' 11\").    Weight as of 10/31/24: 72.6 kg (160 lb).    Physical Exam  GENERAL: alert and no distress  NECK: no adenopathy, no asymmetry, masses, or scars  RESP: lungs clear to auscultation - no " rales, rhonchi or wheezes  CV: regular rate and rhythm, normal S1 S2, no S3 or S4, no murmur, click or rub, no peripheral edema  ABDOMEN: soft, nontender, no hepatosplenomegaly, no masses and bowel sounds normal  MS: no gross musculoskeletal defects noted, no edema          3/18/2025   Mini Cog   Clock Draw Score 2 Normal   3 Item Recall 3 objects recalled   Mini Cog Total Score 5         Signed Electronically by: Quentin Ash MD

## 2025-03-18 NOTE — NURSING NOTE
Prior to immunization administration, verified patients identity using patient s name and date of birth. Please see Immunization Activity for additional information.     Screening Questionnaire for Adult Immunization    Are you sick today?   No   Do you have allergies to medications, food, a vaccine component or latex?   No   Have you ever had a serious reaction after receiving a vaccination?   No   Do you have a long-term health problem with heart, lung, kidney, or metabolic disease (e.g., diabetes), asthma, a blood disorder, no spleen, complement component deficiency, a cochlear implant, or a spinal fluid leak?  Are you on long-term aspirin therapy?   No   Do you have cancer, leukemia, HIV/AIDS, or any other immune system problem?   No   Do you have a parent, brother, or sister with an immune system problem?   No   In the past 3 months, have you taken medications that affect  your immune system, such as prednisone, other steroids, or anticancer drugs; drugs for the treatment of rheumatoid arthritis, Crohn s disease, or psoriasis; or have you had radiation treatments?   No   Have you had a seizure, or a brain or other nervous system problem?   No   During the past year, have you received a transfusion of blood or blood    products, or been given immune (gamma) globulin or antiviral drug?   No   For women: Are you pregnant or is there a chance you could become       pregnant during the next month?   No   Have you received any vaccinations in the past 4 weeks?   No     Immunization questionnaire answers were all negative.      Patient instructed to remain in clinic for 15 minutes afterwards, and to report any adverse reactions.     Screening performed by Pili Nicolas LPN on 3/18/2025 at 4:13 PM.

## 2025-03-18 NOTE — NURSING NOTE
"64 year old  Chief Complaint   Patient presents with    Physical     MMR booster? Sleep -- harder as getting older, notes dizziness. Roller skies and cycles, balance. Back -- pulled it out in January, PT? What do to prevent from recurring. Sciatic nerve -- constant annoyance, will PT help? Notes slight pain now.    Urinary Problem     Had a UTI x2 years ago, PSA was wonky after, has been coming down. Rides bike often. Recheck.       Blood pressure 134/81, pulse 61, temperature 97.4  F (36.3  C), temperature source Temporal, resp. rate 16, height 1.81 m (5' 11.25\"), weight 73.9 kg (163 lb), SpO2 98%. Body mass index is 22.57 kg/m .  Patient Active Problem List   Diagnosis    WALDEMAR (obstructive sleep apnea)    Anosmia    Neuropathy       Wt Readings from Last 2 Encounters:   03/18/25 73.9 kg (163 lb)   10/31/24 72.6 kg (160 lb)     BP Readings from Last 3 Encounters:   03/18/25 134/81   10/31/24 117/81   03/04/24 (!) 139/90         Current Outpatient Medications   Medication Sig Dispense Refill    albuterol (PROAIR HFA/PROVENTIL HFA/VENTOLIN HFA) 108 (90 Base) MCG/ACT inhaler Inhale 2 puffs into the lungs every 4 hours as needed for shortness of breath or wheezing 18 g 3    sildenafil (VIAGRA) 100 MG tablet Take 1 tablet (100 mg) by mouth daily as needed (ED) Take 30min- 4 hrs before intercourse.No use with nitroglycerin, terazosin or doxazosin. 30 tablet 5    Vitamin D, Cholecalciferol, 25 MCG (1000 UT) CAPS        No current facility-administered medications for this visit.       Social History     Tobacco Use    Smoking status: Never    Smokeless tobacco: Former     Types: Chew     Quit date: 6/24/1994   Vaping Use    Vaping status: Never Used   Substance Use Topics    Alcohol use: Yes     Comment: 7-14 drinks per week    Drug use: No       Health Maintenance Due   Topic Date Due    Pneumococcal Vaccine: 50+ Years (2 of 2 - PCV) 07/02/2022    DTAP/TDAP/TD IMMUNIZATION (2 - Td or Tdap) 03/08/2023    MEDICARE ANNUAL " "WELLNESS VISIT  08/07/2024       No results found for: \"PAP\"      March 18, 2025 3:33 PM    "

## 2025-03-19 LAB — PSA SERPL DL<=0.01 NG/ML-MCNC: 1.29 NG/ML (ref 0–4.5)

## 2025-03-22 ENCOUNTER — MYC REFILL (OUTPATIENT)
Dept: FAMILY MEDICINE | Facility: CLINIC | Age: 65
End: 2025-03-22

## 2025-03-22 DIAGNOSIS — R53.83 LACK OF STAMINA: ICD-10-CM

## 2025-03-22 DIAGNOSIS — R06.09 DYSPNEA ON EXERTION: ICD-10-CM

## 2025-03-22 RX ORDER — ALBUTEROL SULFATE 90 UG/1
2 INHALANT RESPIRATORY (INHALATION) EVERY 4 HOURS PRN
Qty: 18 G | Refills: 3 | Status: CANCELLED | OUTPATIENT
Start: 2025-03-22

## 2025-06-06 ENCOUNTER — OFFICE VISIT (OUTPATIENT)
Dept: FAMILY MEDICINE | Facility: CLINIC | Age: 65
End: 2025-06-06
Payer: MEDICARE

## 2025-06-06 ENCOUNTER — RESULTS FOLLOW-UP (OUTPATIENT)
Dept: FAMILY MEDICINE | Facility: CLINIC | Age: 65
End: 2025-06-06

## 2025-06-06 VITALS
HEART RATE: 79 BPM | WEIGHT: 161 LBS | OXYGEN SATURATION: 97 % | SYSTOLIC BLOOD PRESSURE: 129 MMHG | BODY MASS INDEX: 22.3 KG/M2 | RESPIRATION RATE: 15 BRPM | DIASTOLIC BLOOD PRESSURE: 81 MMHG | TEMPERATURE: 97.1 F

## 2025-06-06 DIAGNOSIS — N41.0 ACUTE PROSTATITIS: Primary | ICD-10-CM

## 2025-06-06 DIAGNOSIS — R30.0 BURNING WITH URINATION: ICD-10-CM

## 2025-06-06 LAB
ALBUMIN UR-MCNC: NEGATIVE MG/DL
ANION GAP SERPL CALCULATED.3IONS-SCNC: 9 MMOL/L (ref 7–15)
APPEARANCE UR: ABNORMAL
BASO+EOS+MONOS # BLD AUTO: 0.8 10E3/UL (ref 0–2.2)
BASO+EOS+MONOS NFR BLD AUTO: 7 %
BILIRUB UR QL STRIP: NEGATIVE
BUN SERPL-MCNC: 15.8 MG/DL (ref 8–23)
CALCIUM SERPL-MCNC: 9.1 MG/DL (ref 8.8–10.4)
CHLORIDE SERPL-SCNC: 105 MMOL/L (ref 98–107)
COLOR UR AUTO: YELLOW
CREAT SERPL-MCNC: 1.08 MG/DL (ref 0.67–1.17)
CRP SERPL-MCNC: 34.1 MG/L
EGFRCR SERPLBLD CKD-EPI 2021: 76 ML/MIN/1.73M2
ERYTHROCYTE [DISTWIDTH] IN BLOOD BY AUTOMATED COUNT: 13.2 % (ref 10–15)
GLUCOSE SERPL-MCNC: 98 MG/DL (ref 70–99)
GLUCOSE UR STRIP-MCNC: NEGATIVE MG/DL
HCO3 SERPL-SCNC: 25 MMOL/L (ref 22–29)
HCT VFR BLD AUTO: 42.8 % (ref 40–53)
HGB BLD-MCNC: 14.7 G/DL (ref 13.3–17.7)
HGB UR QL STRIP: ABNORMAL
KETONES UR STRIP-MCNC: NEGATIVE MG/DL
LEUKOCYTE ESTERASE UR QL STRIP: ABNORMAL
LYMPHOCYTES # BLD AUTO: 1 10E3/UL (ref 0.8–5.3)
LYMPHOCYTES NFR BLD AUTO: 8 %
MCH RBC QN AUTO: 31.4 PG (ref 26.5–33)
MCHC RBC AUTO-ENTMCNC: 34.3 G/DL (ref 31.5–36.5)
MCV RBC AUTO: 92 FL (ref 78–100)
NEUTROPHILS # BLD AUTO: 10.9 10E3/UL (ref 1.6–8.3)
NEUTROPHILS NFR BLD AUTO: 85 %
NITRATE UR QL: POSITIVE
PH UR STRIP: 5.5 [PH] (ref 5–7)
PLATELET # BLD AUTO: 208 10E3/UL (ref 150–450)
POTASSIUM SERPL-SCNC: 4.4 MMOL/L (ref 3.4–5.3)
PSA SERPL DL<=0.01 NG/ML-MCNC: 2.76 NG/ML (ref 0–4.5)
RBC # BLD AUTO: 4.68 10E6/UL (ref 4.4–5.9)
SODIUM SERPL-SCNC: 139 MMOL/L (ref 135–145)
SP GR UR STRIP: 1.01 (ref 1–1.03)
UROBILINOGEN UR STRIP-ACNC: 0.2 E.U./DL
WBC # BLD AUTO: 12.7 10E3/UL (ref 4–11)

## 2025-06-06 PROCEDURE — 87086 URINE CULTURE/COLONY COUNT: CPT | Mod: ORL | Performed by: INTERNAL MEDICINE

## 2025-06-06 PROCEDURE — 87186 SC STD MICRODIL/AGAR DIL: CPT | Mod: ORL | Performed by: INTERNAL MEDICINE

## 2025-06-06 RX ORDER — CIPROFLOXACIN 500 MG/1
500 TABLET, FILM COATED ORAL 2 TIMES DAILY
Qty: 60 TABLET | Refills: 0 | Status: SHIPPED | OUTPATIENT
Start: 2025-06-06 | End: 2025-07-06

## 2025-06-06 NOTE — PROGRESS NOTES
[facility] [address], [address]  [facility] [address]  Phone: [phone]  Fax: [phone]    Patient:  Tommy Lerner 1960  Date of Visit:  9:33 AM  Referring Provider Referred Self      Assessment & Plan    (N41.0) Acute prostatitis  (primary encounter diagnosis)  Comment: symptoms consistent with acute prostatitis  Plan: PSA, screen, CRP, inflammation, Basic metabolic        panel  (Ca, Cl, CO2, Creat, Gluc, K, Na, BUN),         CBC with platelets differential, ciprofloxacin         (CIPRO) 500 MG tablet        Treat with 4 wks of Cipro, no tamsulosin--triggered dizziness  If rigors, chills, nausea, unable to take meds, proceed to ER  Abstain from 72 mi bike ride scheduled this weekend.  Fluids rest, notify MD if not improving, finish all medication    (R30.0) Burning with urination  Comment: acute UTI secondary to prostatits  Plan: Urine Culture Aerobic Bacterial - lab collect        Await culture    Shae Brumfield MD       Tommy Lerner is a 64 year old male here for the following issues:    Burning with urination  Yesterday had burning with urination after 50 mi bike ride and hike  No blood  Drank lots of water, cranberry juice  + Chills fever no rigors   Urgency, up 5x last night to void, small amounts  No nausea  Last seen for similar symptoms in 5/2023, dx with prostatitis  Did not tolerate Bactrim, switched to Cipro  Did not tolerate tamsulosin---dizziness    BPH symptoms  Dampened urine steam and dribbling--ongoing for a while        Patient Active Problem List   Diagnosis    WALDEMAR (obstructive sleep apnea)    Anosmia    Neuropathy       Current Outpatient Medications   Medication Sig Dispense Refill    albuterol (PROAIR HFA/PROVENTIL HFA/VENTOLIN HFA) 108 (90 Base) MCG/ACT inhaler Inhale 2 puffs into the lungs every 4 hours as needed for shortness of breath or wheezing. 18 g 3    sildenafil (VIAGRA) 100 MG tablet Take 1 tablet  (100 mg) by mouth daily as needed (ED) Take 30min- 4 hrs before intercourse.No use with nitroglycerin, terazosin or doxazosin. 30 tablet 5    Vitamin D, Cholecalciferol, 25 MCG (1000 UT) CAPS          Allergies   Allergen Reactions    Bacid Cough    Soy Allergy (Obsolete)         EXAM  /81 (BP Location: Left arm, Patient Position: Sitting, Cuff Size: Adult Regular)   Pulse 79   Temp 97.1  F (36.2  C) (Skin)   Resp 15   Wt 73 kg (161 lb)   SpO2 97%   BMI 22.30 kg/m    Gen: Alert, pleasant, NAD  COR: S1,S2, no murmur or tachycardia  Lungs: CTA bilaterally, no rhonchi, wheezes or rales  Abdomen: Soft, mild discomfort over lower pelvis, normal bowel sounds  Back: No CVAT

## 2025-06-06 NOTE — NURSING NOTE
"64 year old    Chief Complaint   Patient presents with    UTI     Started yesterday, burning with urination        Blood pressure 129/81, pulse 79, temperature 97.1  F (36.2  C), temperature source Skin, resp. rate 15, weight 73 kg (161 lb), SpO2 97%. Body mass index is 22.3 kg/m .    Patient Active Problem List   Diagnosis    WALDEMAR (obstructive sleep apnea)    Anosmia    Neuropathy          Wt Readings from Last 2 Encounters:   06/06/25 73 kg (161 lb)   03/18/25 73.9 kg (163 lb)       BP Readings from Last 3 Encounters:   06/06/25 129/81   03/18/25 134/81   10/31/24 117/81             Current Outpatient Medications   Medication Sig Dispense Refill    albuterol (PROAIR HFA/PROVENTIL HFA/VENTOLIN HFA) 108 (90 Base) MCG/ACT inhaler Inhale 2 puffs into the lungs every 4 hours as needed for shortness of breath or wheezing. 18 g 3    sildenafil (VIAGRA) 100 MG tablet Take 1 tablet (100 mg) by mouth daily as needed (ED) Take 30min- 4 hrs before intercourse.No use with nitroglycerin, terazosin or doxazosin. 30 tablet 5    Vitamin D, Cholecalciferol, 25 MCG (1000 UT) CAPS        No current facility-administered medications for this visit.          Social History     Tobacco Use    Smoking status: Never    Smokeless tobacco: Former     Types: Chew     Quit date: 6/24/1994   Vaping Use    Vaping status: Never Used   Substance Use Topics    Alcohol use: Yes     Comment: 7-14 drinks per week    Drug use: No          Health Maintenance Due   Topic Date Due    DTAP/TDAP/TD VACCINE (2 - Td or Tdap) 03/08/2023        No results found for: \"PAP\"        June 6, 2025 9:28 AM        "

## 2025-06-07 LAB — BACTERIA UR CULT: ABNORMAL

## 2025-06-23 ENCOUNTER — PATIENT OUTREACH (OUTPATIENT)
Dept: CARE COORDINATION | Facility: CLINIC | Age: 65
End: 2025-06-23
Payer: MEDICARE

## 2025-07-21 NOTE — PROGRESS NOTES
"  Assessment & Plan   Problem List Items Addressed This Visit    None  Visit Diagnoses         Right arm weakness    -  Primary    Relevant Orders    XR Cervical Spine G/E 4 Views    MR Cervical Spine w/o Contrast      Neck pain        Relevant Orders    XR Cervical Spine G/E 4 Views    MR Cervical Spine w/o Contrast           Concern for possible nerve injury vs muscular strain vs persistent inflammation. Tommy has prednisone at home from a previous injury and we discussed him taking a short steroid burst of 40mg daily for the next 3 days. If symptoms persist, then follow up with imaging as noted above.    34 minutes spent on the date of the encounter doing chart review, history and exam, documentation and further activities as noted.       Quentin Ash MD  10:55 AM, July 22, 2025      Subjective   Tommy is a 64 year old, presenting for the following health issues:  Neck Pain (Ongoing pain- Started about 5 weeks ago, does not recall any trauma to it. Pt assumed it could be muscular  but it has not went away, Pt can turn left but it hurts to turn right and laying down on either side hurts the most, and in the morning. It is a shooting type of pain. )    HPI    Ongoing neck pain  - RIGHT side  - for the past month or so  - turning up and to the RIGHT  - lying on LEFT side in bed also hurts  - occasional pain radiating down RIGHT arm  - some weakness and paresthesia in RIGHT arm      Review of Systems  Constitutional, HEENT, cardiovascular, pulmonary, gi and gu systems are negative, except as otherwise noted.      Objective    /85   Pulse 56   Temp 97.7  F (36.5  C)   Ht 1.803 m (5' 11\")   Wt 73.5 kg (162 lb)   SpO2 96%   BMI 22.59 kg/m    Body mass index is 22.59 kg/m .    Physical Exam   GENERAL: alert and no distress  NECK: no adenopathy, no asymmetry, masses, or scars  RESP: lungs clear to auscultation - no rales, rhonchi or wheezes  CV: regular rate and rhythm, normal S1 S2, no S3 or S4, no murmur, " click or rub, no peripheral edema  MS: neck  - pain with rotation to RIGHT shoulder  - decreased sensation and strength in RUE grossly          Signed Electronically by: Quentin Ash MD     Imaging Studies/Medications

## 2025-07-22 ENCOUNTER — OFFICE VISIT (OUTPATIENT)
Dept: FAMILY MEDICINE | Facility: CLINIC | Age: 65
End: 2025-07-22
Payer: MEDICARE

## 2025-07-22 VITALS
SYSTOLIC BLOOD PRESSURE: 127 MMHG | WEIGHT: 162 LBS | HEIGHT: 71 IN | BODY MASS INDEX: 22.68 KG/M2 | TEMPERATURE: 97.7 F | HEART RATE: 56 BPM | DIASTOLIC BLOOD PRESSURE: 85 MMHG | OXYGEN SATURATION: 96 %

## 2025-07-22 DIAGNOSIS — R29.898 RIGHT ARM WEAKNESS: Primary | ICD-10-CM

## 2025-07-22 DIAGNOSIS — M54.2 NECK PAIN: ICD-10-CM

## 2025-07-22 NOTE — NURSING NOTE
"Tommy  64 year old    Chief Complaint   Patient presents with    Neck Pain     Ongoing pain- Started about 5 weeks ago, does not recall any trauma to it. Pt assumed it could be muscular  but it has not went away, Pt can turn left but it hurts to turn right and laying down on either side hurts the most, and in the morning. It is a shooting type of pain.            Blood pressure 127/85, pulse 56, temperature 97.7  F (36.5  C), height 1.803 m (5' 11\"), weight 73.5 kg (162 lb), SpO2 96%. Body mass index is 22.59 kg/m .    Patient Active Problem List   Diagnosis    WALDEMAR (obstructive sleep apnea)    Anosmia    Neuropathy    Acute prostatitis             Wt Readings from Last 2 Encounters:   07/22/25 73.5 kg (162 lb)   06/06/25 73 kg (161 lb)       BP Readings from Last 3 Encounters:   07/22/25 127/85   06/06/25 129/81   03/18/25 134/81                Current Outpatient Medications   Medication Sig Dispense Refill    albuterol (PROAIR HFA/PROVENTIL HFA/VENTOLIN HFA) 108 (90 Base) MCG/ACT inhaler Inhale 2 puffs into the lungs every 4 hours as needed for shortness of breath or wheezing. 18 g 3    sildenafil (VIAGRA) 100 MG tablet Take 1 tablet (100 mg) by mouth daily as needed (ED) Take 30min- 4 hrs before intercourse.No use with nitroglycerin, terazosin or doxazosin. 30 tablet 5    Vitamin D, Cholecalciferol, 25 MCG (1000 UT) CAPS        No current facility-administered medications for this visit.             Social History     Tobacco Use    Smoking status: Never    Smokeless tobacco: Former     Types: Chew     Quit date: 6/24/1994   Vaping Use    Vaping status: Never Used   Substance Use Topics    Alcohol use: Yes     Comment: 7-14 drinks per week    Drug use: No             Health Maintenance Due   Topic Date Due    DTAP/TDAP/TD VACCINE (2 - Td or Tdap) 03/08/2023    COVID-19 VACCINE (7 - 2024-25 season) 07/04/2025           No results found for: \"PAP\"           July 22, 2025 9:29 AM    "

## 2025-08-08 ENCOUNTER — ANCILLARY PROCEDURE (OUTPATIENT)
Dept: MRI IMAGING | Facility: CLINIC | Age: 65
End: 2025-08-08
Attending: FAMILY MEDICINE
Payer: MEDICARE

## 2025-08-08 ENCOUNTER — ANCILLARY PROCEDURE (OUTPATIENT)
Dept: GENERAL RADIOLOGY | Facility: CLINIC | Age: 65
End: 2025-08-08
Attending: FAMILY MEDICINE
Payer: MEDICARE

## 2025-08-08 DIAGNOSIS — R29.898 RIGHT ARM WEAKNESS: ICD-10-CM

## 2025-08-08 DIAGNOSIS — M54.2 NECK PAIN: ICD-10-CM

## 2025-08-08 PROCEDURE — 72050 X-RAY EXAM NECK SPINE 4/5VWS: CPT | Mod: GC | Performed by: STUDENT IN AN ORGANIZED HEALTH CARE EDUCATION/TRAINING PROGRAM

## 2025-08-08 PROCEDURE — 72141 MRI NECK SPINE W/O DYE: CPT | Performed by: STUDENT IN AN ORGANIZED HEALTH CARE EDUCATION/TRAINING PROGRAM

## 2025-08-11 ENCOUNTER — RESULTS FOLLOW-UP (OUTPATIENT)
Dept: FAMILY MEDICINE | Facility: CLINIC | Age: 65
End: 2025-08-11

## 2025-08-11 DIAGNOSIS — M54.2 NECK PAIN: ICD-10-CM

## 2025-08-11 DIAGNOSIS — R29.898 RIGHT ARM WEAKNESS: Primary | ICD-10-CM

## 2025-08-14 ENCOUNTER — PATIENT OUTREACH (OUTPATIENT)
Dept: CARE COORDINATION | Facility: CLINIC | Age: 65
End: 2025-08-14
Payer: MEDICARE

## 2025-08-18 ENCOUNTER — OFFICE VISIT (OUTPATIENT)
Dept: PHYSICAL MEDICINE AND REHAB | Facility: CLINIC | Age: 65
End: 2025-08-18
Attending: FAMILY MEDICINE
Payer: MEDICARE

## 2025-08-18 VITALS — OXYGEN SATURATION: 98 % | DIASTOLIC BLOOD PRESSURE: 79 MMHG | HEART RATE: 62 BPM | SYSTOLIC BLOOD PRESSURE: 133 MMHG

## 2025-08-18 DIAGNOSIS — M54.12 CERVICAL RADICULOPATHY: ICD-10-CM

## 2025-08-18 DIAGNOSIS — M50.30 BULGING OF CERVICAL INTERVERTEBRAL DISC: ICD-10-CM

## 2025-08-18 DIAGNOSIS — M54.2 NECK PAIN: Primary | ICD-10-CM

## 2025-08-18 DIAGNOSIS — M48.02 NEUROFORAMINAL STENOSIS OF CERVICAL SPINE: ICD-10-CM

## 2025-08-18 PROCEDURE — 3078F DIAST BP <80 MM HG: CPT | Performed by: NURSE PRACTITIONER

## 2025-08-18 PROCEDURE — 3075F SYST BP GE 130 - 139MM HG: CPT | Performed by: NURSE PRACTITIONER

## 2025-08-18 PROCEDURE — 99204 OFFICE O/P NEW MOD 45 MIN: CPT | Performed by: NURSE PRACTITIONER

## 2025-08-18 RX ORDER — COVID-19 ANTIGEN TEST
220 KIT MISCELLANEOUS PRN
COMMUNITY

## 2025-08-18 RX ORDER — CYCLOBENZAPRINE HCL 5 MG
5 TABLET ORAL 3 TIMES DAILY PRN
Qty: 30 TABLET | Refills: 0 | Status: SHIPPED | OUTPATIENT
Start: 2025-08-18

## 2025-08-21 ENCOUNTER — TELEPHONE (OUTPATIENT)
Dept: PHYSICAL MEDICINE AND REHAB | Facility: CLINIC | Age: 65
End: 2025-08-21
Payer: MEDICARE

## (undated) RX ORDER — LIDOCAINE HYDROCHLORIDE 20 MG/ML
JELLY TOPICAL
Status: DISPENSED
Start: 2022-06-28

## (undated) RX ORDER — FENTANYL CITRATE 50 UG/ML
INJECTION, SOLUTION INTRAMUSCULAR; INTRAVENOUS
Status: DISPENSED
Start: 2021-04-29

## (undated) RX ORDER — ALBUTEROL SULFATE 0.83 MG/ML
SOLUTION RESPIRATORY (INHALATION)
Status: DISPENSED
Start: 2021-05-05

## (undated) RX ORDER — LIDOCAINE HYDROCHLORIDE 20 MG/ML
SOLUTION OROPHARYNGEAL
Status: DISPENSED
Start: 2021-04-28

## (undated) RX ORDER — FENTANYL CITRATE 50 UG/ML
INJECTION, SOLUTION INTRAMUSCULAR; INTRAVENOUS
Status: DISPENSED
Start: 2024-10-31